# Patient Record
Sex: MALE | Race: WHITE | Employment: UNEMPLOYED | URBAN - METROPOLITAN AREA
[De-identification: names, ages, dates, MRNs, and addresses within clinical notes are randomized per-mention and may not be internally consistent; named-entity substitution may affect disease eponyms.]

---

## 2017-02-24 ENCOUNTER — HOSPITAL ENCOUNTER (EMERGENCY)
Age: 57
Discharge: HOME | End: 2017-02-24
Attending: EMERGENCY MEDICINE
Payer: MEDICAID

## 2017-02-24 ENCOUNTER — APPOINTMENT (OUTPATIENT)
Dept: GENERAL RADIOLOGY | Age: 57
End: 2017-02-24
Payer: MEDICAID

## 2017-02-24 VITALS
WEIGHT: 187 LBS | OXYGEN SATURATION: 100 % | TEMPERATURE: 97.6 F | BODY MASS INDEX: 24.78 KG/M2 | HEIGHT: 73 IN | HEART RATE: 57 BPM | DIASTOLIC BLOOD PRESSURE: 87 MMHG | SYSTOLIC BLOOD PRESSURE: 140 MMHG | RESPIRATION RATE: 20 BRPM

## 2017-02-24 DIAGNOSIS — S49.92XA INJURY OF LEFT CLAVICLE, INITIAL ENCOUNTER: Primary | ICD-10-CM

## 2017-02-24 LAB
ANION GAP SERPL CALCULATED.3IONS-SCNC: 11 MEQ/L (ref 7–13)
APTT: 27.3 SEC (ref 21.6–35.4)
BUN BLDV-MCNC: 15 MG/DL (ref 6–20)
CALCIUM SERPL-MCNC: 9.5 MG/DL (ref 8.6–10.2)
CHLORIDE BLD-SCNC: 103 MEQ/L (ref 98–107)
CO2: 25 MEQ/L (ref 22–29)
CREAT SERPL-MCNC: 0.98 MG/DL (ref 0.7–1.2)
GFR AFRICAN AMERICAN: >60
GFR NON-AFRICAN AMERICAN: >60
GLUCOSE BLD-MCNC: 94 MG/DL (ref 74–109)
HCT VFR BLD CALC: 40.3 % (ref 42–52)
HEMOGLOBIN: 14 G/DL (ref 14–18)
INR BLD: 1
MAGNESIUM: 2.1 MG/DL (ref 1.7–2.3)
MCH RBC QN AUTO: 30.9 PG (ref 27–31.3)
MCHC RBC AUTO-ENTMCNC: 34.8 % (ref 33–37)
MCV RBC AUTO: 88.8 FL (ref 80–100)
PDW BLD-RTO: 13 % (ref 11.5–14.5)
PLATELET # BLD: 196 K/UL (ref 130–400)
POTASSIUM SERPL-SCNC: 4 MEQ/L (ref 3.5–5.1)
PROTHROMBIN TIME: 10.5 SEC (ref 8.1–13.7)
RBC # BLD: 4.54 M/UL (ref 4.7–6.1)
SODIUM BLD-SCNC: 139 MEQ/L (ref 132–144)
TROPONIN: <0.01 NG/ML (ref 0–0.01)
WBC # BLD: 5 K/UL (ref 4.8–10.8)

## 2017-02-24 PROCEDURE — 83735 ASSAY OF MAGNESIUM: CPT

## 2017-02-24 PROCEDURE — 85027 COMPLETE CBC AUTOMATED: CPT

## 2017-02-24 PROCEDURE — 96374 THER/PROPH/DIAG INJ IV PUSH: CPT

## 2017-02-24 PROCEDURE — 93005 ELECTROCARDIOGRAM TRACING: CPT

## 2017-02-24 PROCEDURE — 80048 BASIC METABOLIC PNL TOTAL CA: CPT

## 2017-02-24 PROCEDURE — 96375 TX/PRO/DX INJ NEW DRUG ADDON: CPT

## 2017-02-24 PROCEDURE — 36415 COLL VENOUS BLD VENIPUNCTURE: CPT

## 2017-02-24 PROCEDURE — 73000 X-RAY EXAM OF COLLAR BONE: CPT

## 2017-02-24 PROCEDURE — 99285 EMERGENCY DEPT VISIT HI MDM: CPT

## 2017-02-24 PROCEDURE — 6360000002 HC RX W HCPCS: Performed by: EMERGENCY MEDICINE

## 2017-02-24 PROCEDURE — 71020 XR CHEST STANDARD TWO VW: CPT

## 2017-02-24 PROCEDURE — 73030 X-RAY EXAM OF SHOULDER: CPT

## 2017-02-24 PROCEDURE — 84484 ASSAY OF TROPONIN QUANT: CPT

## 2017-02-24 PROCEDURE — 85730 THROMBOPLASTIN TIME PARTIAL: CPT

## 2017-02-24 PROCEDURE — 85610 PROTHROMBIN TIME: CPT

## 2017-02-24 RX ORDER — DULOXETIN HYDROCHLORIDE 60 MG/1
60 CAPSULE, DELAYED RELEASE ORAL DAILY
COMMUNITY
End: 2019-03-04 | Stop reason: SDUPTHER

## 2017-02-24 RX ORDER — KETOROLAC TROMETHAMINE 30 MG/ML
30 INJECTION, SOLUTION INTRAMUSCULAR; INTRAVENOUS ONCE
Status: COMPLETED | OUTPATIENT
Start: 2017-02-24 | End: 2017-02-24

## 2017-02-24 RX ORDER — OXYCODONE HYDROCHLORIDE AND ACETAMINOPHEN 5; 325 MG/1; MG/1
1 TABLET ORAL EVERY 6 HOURS PRN
Qty: 10 TABLET | Refills: 0 | Status: SHIPPED | OUTPATIENT
Start: 2017-02-24 | End: 2018-11-29

## 2017-02-24 RX ORDER — DOCUSATE SODIUM 100 MG/1
100 CAPSULE, LIQUID FILLED ORAL 2 TIMES DAILY PRN
COMMUNITY

## 2017-02-24 RX ORDER — GABAPENTIN 600 MG/1
1200 TABLET ORAL 2 TIMES DAILY
COMMUNITY
End: 2019-03-04 | Stop reason: SDUPTHER

## 2017-02-24 RX ORDER — MIRTAZAPINE 15 MG/1
15 TABLET, FILM COATED ORAL NIGHTLY
COMMUNITY

## 2017-02-24 RX ORDER — ATORVASTATIN CALCIUM 10 MG/1
10 TABLET, FILM COATED ORAL DAILY
COMMUNITY

## 2017-02-24 RX ORDER — OMEPRAZOLE 20 MG/1
40 CAPSULE, DELAYED RELEASE ORAL DAILY
COMMUNITY

## 2017-02-24 RX ORDER — MORPHINE SULFATE 4 MG/ML
4 INJECTION, SOLUTION INTRAMUSCULAR; INTRAVENOUS ONCE
Status: COMPLETED | OUTPATIENT
Start: 2017-02-24 | End: 2017-02-24

## 2017-02-24 RX ORDER — LISINOPRIL 10 MG/1
10 TABLET ORAL DAILY
Status: ON HOLD | COMMUNITY
End: 2017-12-20 | Stop reason: HOSPADM

## 2017-02-24 RX ADMIN — KETOROLAC TROMETHAMINE 30 MG: 30 INJECTION, SOLUTION INTRAMUSCULAR at 09:21

## 2017-02-24 RX ADMIN — MORPHINE SULFATE 4 MG: 4 INJECTION, SOLUTION INTRAMUSCULAR; INTRAVENOUS at 09:22

## 2017-02-24 ASSESSMENT — PAIN DESCRIPTION - FREQUENCY: FREQUENCY: CONTINUOUS

## 2017-02-24 ASSESSMENT — PAIN SCALES - GENERAL
PAINLEVEL_OUTOF10: 8
PAINLEVEL_OUTOF10: 7
PAINLEVEL_OUTOF10: 5
PAINLEVEL_OUTOF10: 7

## 2017-02-24 ASSESSMENT — PAIN DESCRIPTION - LOCATION
LOCATION: SHOULDER;CHEST
LOCATION: CHEST
LOCATION: SHOULDER

## 2017-02-24 ASSESSMENT — PAIN DESCRIPTION - PAIN TYPE
TYPE: ACUTE PAIN

## 2017-02-24 ASSESSMENT — PAIN DESCRIPTION - ORIENTATION
ORIENTATION: LEFT

## 2017-02-24 ASSESSMENT — ENCOUNTER SYMPTOMS
SHORTNESS OF BREATH: 0
COUGH: 0
VOMITING: 0

## 2017-02-24 ASSESSMENT — PAIN DESCRIPTION - DESCRIPTORS: DESCRIPTORS: HEAVINESS

## 2017-02-28 LAB
EKG ATRIAL RATE: 53 BPM
EKG P AXIS: 50 DEGREES
EKG P-R INTERVAL: 166 MS
EKG Q-T INTERVAL: 474 MS
EKG QRS DURATION: 80 MS
EKG QTC CALCULATION (BAZETT): 444 MS
EKG R AXIS: 21 DEGREES
EKG T AXIS: 38 DEGREES
EKG VENTRICULAR RATE: 53 BPM

## 2017-06-13 ENCOUNTER — APPOINTMENT (OUTPATIENT)
Dept: GENERAL RADIOLOGY | Age: 57
End: 2017-06-13
Payer: MEDICAID

## 2017-06-13 ENCOUNTER — HOSPITAL ENCOUNTER (EMERGENCY)
Age: 57
Discharge: HOME | End: 2017-06-13
Attending: EMERGENCY MEDICINE
Payer: MEDICAID

## 2017-06-13 VITALS
OXYGEN SATURATION: 95 % | WEIGHT: 180 LBS | HEIGHT: 73 IN | DIASTOLIC BLOOD PRESSURE: 93 MMHG | HEART RATE: 81 BPM | RESPIRATION RATE: 14 BRPM | TEMPERATURE: 98.1 F | SYSTOLIC BLOOD PRESSURE: 134 MMHG | BODY MASS INDEX: 23.86 KG/M2

## 2017-06-13 DIAGNOSIS — M70.22 OLECRANON BURSITIS OF LEFT ELBOW: Primary | ICD-10-CM

## 2017-06-13 PROCEDURE — 99284 EMERGENCY DEPT VISIT MOD MDM: CPT

## 2017-06-13 PROCEDURE — 73080 X-RAY EXAM OF ELBOW: CPT

## 2017-06-13 ASSESSMENT — ENCOUNTER SYMPTOMS
SHORTNESS OF BREATH: 0
CHEST TIGHTNESS: 0
VOMITING: 0
SORE THROAT: 0
ABDOMINAL PAIN: 0
EYE PAIN: 0
NAUSEA: 0

## 2017-06-13 ASSESSMENT — PAIN DESCRIPTION - LOCATION: LOCATION: ELBOW

## 2017-06-13 ASSESSMENT — PAIN DESCRIPTION - DESCRIPTORS: DESCRIPTORS: SHARP

## 2017-06-13 ASSESSMENT — PAIN DESCRIPTION - ORIENTATION: ORIENTATION: LEFT

## 2017-06-13 ASSESSMENT — PAIN DESCRIPTION - DIRECTION: RADIATING_TOWARDS: LEFT SHOULDER

## 2017-06-13 ASSESSMENT — PAIN DESCRIPTION - PAIN TYPE: TYPE: ACUTE PAIN

## 2017-06-13 ASSESSMENT — PAIN SCALES - GENERAL: PAINLEVEL_OUTOF10: 7

## 2017-12-05 ENCOUNTER — ANESTHESIA EVENT (OUTPATIENT)
Dept: OPERATING ROOM | Age: 57
DRG: 850 | End: 2017-12-05
Payer: MEDICAID

## 2017-12-05 ENCOUNTER — ANESTHESIA (OUTPATIENT)
Dept: OPERATING ROOM | Age: 57
DRG: 850 | End: 2017-12-05
Payer: MEDICAID

## 2017-12-05 ENCOUNTER — APPOINTMENT (OUTPATIENT)
Dept: GENERAL RADIOLOGY | Age: 57
DRG: 850 | End: 2017-12-05
Attending: ORTHOPAEDIC SURGERY
Payer: MEDICAID

## 2017-12-05 ENCOUNTER — HOSPITAL ENCOUNTER (OUTPATIENT)
Age: 57
Discharge: NURSING, REHAB OR LONG-TERM CARE | DRG: 850 | End: 2017-12-06
Attending: ORTHOPAEDIC SURGERY | Admitting: ORTHOPAEDIC SURGERY
Payer: MEDICAID

## 2017-12-05 VITALS
RESPIRATION RATE: 12 BRPM | DIASTOLIC BLOOD PRESSURE: 63 MMHG | SYSTOLIC BLOOD PRESSURE: 146 MMHG | TEMPERATURE: 96.8 F | OXYGEN SATURATION: 100 %

## 2017-12-05 PROBLEM — S46.311A TRICEPS TENDON RUPTURE, RIGHT, INITIAL ENCOUNTER: Status: ACTIVE | Noted: 2017-12-05

## 2017-12-05 LAB
ANION GAP SERPL CALCULATED.3IONS-SCNC: 10 MEQ/L (ref 7–13)
BASOPHILS ABSOLUTE: 0.1 K/UL (ref 0–0.2)
BASOPHILS RELATIVE PERCENT: 1.5 %
BUN BLDV-MCNC: 10 MG/DL (ref 6–20)
CALCIUM SERPL-MCNC: 9.5 MG/DL (ref 8.6–10.2)
CHLORIDE BLD-SCNC: 100 MEQ/L (ref 98–107)
CO2: 26 MEQ/L (ref 22–29)
CREAT SERPL-MCNC: 0.93 MG/DL (ref 0.7–1.2)
EOSINOPHILS ABSOLUTE: 0.3 K/UL (ref 0–0.7)
EOSINOPHILS RELATIVE PERCENT: 4.8 %
GFR AFRICAN AMERICAN: >60
GFR NON-AFRICAN AMERICAN: >60
GLUCOSE BLD-MCNC: 96 MG/DL (ref 74–109)
HCT VFR BLD CALC: 42.3 % (ref 42–52)
HEMOGLOBIN: 14.3 G/DL (ref 14–18)
LYMPHOCYTES ABSOLUTE: 2.1 K/UL (ref 1–4.8)
LYMPHOCYTES RELATIVE PERCENT: 40.5 %
MCH RBC QN AUTO: 31.2 PG (ref 27–31.3)
MCHC RBC AUTO-ENTMCNC: 33.9 % (ref 33–37)
MCV RBC AUTO: 92 FL (ref 80–100)
MONOCYTES ABSOLUTE: 0.6 K/UL (ref 0.2–0.8)
MONOCYTES RELATIVE PERCENT: 12.1 %
NEUTROPHILS ABSOLUTE: 2.2 K/UL (ref 1.4–6.5)
NEUTROPHILS RELATIVE PERCENT: 41.1 %
PDW BLD-RTO: 13.5 % (ref 11.5–14.5)
PLATELET # BLD: 243 K/UL (ref 130–400)
POTASSIUM SERPL-SCNC: 4.2 MEQ/L (ref 3.5–5.1)
RBC # BLD: 4.6 M/UL (ref 4.7–6.1)
SODIUM BLD-SCNC: 136 MEQ/L (ref 132–144)
SPECIMEN STATUS: NORMAL
WBC # BLD: 5.3 K/UL (ref 4.8–10.8)

## 2017-12-05 PROCEDURE — 3700000001 HC ADD 15 MINUTES (ANESTHESIA): Performed by: ORTHOPAEDIC SURGERY

## 2017-12-05 PROCEDURE — 7100000000 HC PACU RECOVERY - FIRST 15 MIN: Performed by: ORTHOPAEDIC SURGERY

## 2017-12-05 PROCEDURE — 80048 BASIC METABOLIC PNL TOTAL CA: CPT

## 2017-12-05 PROCEDURE — 3700000000 HC ANESTHESIA ATTENDED CARE: Performed by: ORTHOPAEDIC SURGERY

## 2017-12-05 PROCEDURE — 3209999900 FLUORO FOR SURGICAL PROCEDURES

## 2017-12-05 PROCEDURE — 3600000004 HC SURGERY LEVEL 4 BASE: Performed by: ORTHOPAEDIC SURGERY

## 2017-12-05 PROCEDURE — 2500000003 HC RX 250 WO HCPCS: Performed by: NURSE ANESTHETIST, CERTIFIED REGISTERED

## 2017-12-05 PROCEDURE — 87205 SMEAR GRAM STAIN: CPT

## 2017-12-05 PROCEDURE — 76942 ECHO GUIDE FOR BIOPSY: CPT | Performed by: ANESTHESIOLOGY

## 2017-12-05 PROCEDURE — 6370000000 HC RX 637 (ALT 250 FOR IP): Performed by: INTERNAL MEDICINE

## 2017-12-05 PROCEDURE — 2500000003 HC RX 250 WO HCPCS: Performed by: ORTHOPAEDIC SURGERY

## 2017-12-05 PROCEDURE — 6360000002 HC RX W HCPCS: Performed by: ANESTHESIOLOGY

## 2017-12-05 PROCEDURE — 87070 CULTURE OTHR SPECIMN AEROBIC: CPT

## 2017-12-05 PROCEDURE — 2580000003 HC RX 258: Performed by: ORTHOPAEDIC SURGERY

## 2017-12-05 PROCEDURE — 87075 CULTR BACTERIA EXCEPT BLOOD: CPT

## 2017-12-05 PROCEDURE — C1713 ANCHOR/SCREW BN/BN,TIS/BN: HCPCS | Performed by: ORTHOPAEDIC SURGERY

## 2017-12-05 PROCEDURE — 85025 COMPLETE CBC W/AUTO DIFF WBC: CPT

## 2017-12-05 PROCEDURE — 6360000002 HC RX W HCPCS: Performed by: ORTHOPAEDIC SURGERY

## 2017-12-05 PROCEDURE — 6370000000 HC RX 637 (ALT 250 FOR IP): Performed by: ORTHOPAEDIC SURGERY

## 2017-12-05 PROCEDURE — 7100000001 HC PACU RECOVERY - ADDTL 15 MIN: Performed by: ORTHOPAEDIC SURGERY

## 2017-12-05 PROCEDURE — 6360000002 HC RX W HCPCS: Performed by: NURSE ANESTHETIST, CERTIFIED REGISTERED

## 2017-12-05 PROCEDURE — 2580000003 HC RX 258: Performed by: ANESTHESIOLOGY

## 2017-12-05 PROCEDURE — 2700000000 HC OXYGEN THERAPY PER DAY

## 2017-12-05 PROCEDURE — 2500000003 HC RX 250 WO HCPCS: Performed by: ANESTHESIOLOGY

## 2017-12-05 PROCEDURE — 3600000014 HC SURGERY LEVEL 4 ADDTL 15MIN: Performed by: ORTHOPAEDIC SURGERY

## 2017-12-05 DEVICE — KIT INT FIX W4.75XL19.1MM 2ND W/ SWIVELOCK FOR ACL/PCL REP: Type: IMPLANTABLE DEVICE | Site: ELBOW | Status: FUNCTIONAL

## 2017-12-05 DEVICE — ANCHOR SUTURE BIOCOMP 3X14.5 MM FIBERWIRE 2 TIGERWIRE: Type: IMPLANTABLE DEVICE | Site: ELBOW | Status: FUNCTIONAL

## 2017-12-05 RX ORDER — FENTANYL CITRATE 50 UG/ML
50 INJECTION, SOLUTION INTRAMUSCULAR; INTRAVENOUS EVERY 10 MIN PRN
Status: DISCONTINUED | OUTPATIENT
Start: 2017-12-05 | End: 2017-12-05 | Stop reason: HOSPADM

## 2017-12-05 RX ORDER — HYDROCODONE BITARTRATE AND ACETAMINOPHEN 5; 325 MG/1; MG/1
1 TABLET ORAL PRN
Status: DISCONTINUED | OUTPATIENT
Start: 2017-12-05 | End: 2017-12-05 | Stop reason: HOSPADM

## 2017-12-05 RX ORDER — ONDANSETRON 2 MG/ML
4 INJECTION INTRAMUSCULAR; INTRAVENOUS
Status: DISCONTINUED | OUTPATIENT
Start: 2017-12-05 | End: 2017-12-05 | Stop reason: HOSPADM

## 2017-12-05 RX ORDER — SODIUM CHLORIDE, SODIUM LACTATE, POTASSIUM CHLORIDE, CALCIUM CHLORIDE 600; 310; 30; 20 MG/100ML; MG/100ML; MG/100ML; MG/100ML
INJECTION, SOLUTION INTRAVENOUS CONTINUOUS
Status: DISCONTINUED | OUTPATIENT
Start: 2017-12-05 | End: 2017-12-05

## 2017-12-05 RX ORDER — DORZOLAMIDE HYDROCHLORIDE AND TIMOLOL MALEATE 20; 5 MG/ML; MG/ML
SOLUTION/ DROPS OPHTHALMIC
Refills: 6 | COMMUNITY
Start: 2017-11-05

## 2017-12-05 RX ORDER — LIDOCAINE HYDROCHLORIDE 20 MG/ML
INJECTION, SOLUTION INFILTRATION; PERINEURAL PRN
Status: DISCONTINUED | OUTPATIENT
Start: 2017-12-05 | End: 2017-12-05 | Stop reason: SDUPTHER

## 2017-12-05 RX ORDER — GABAPENTIN 300 MG/1
1200 CAPSULE ORAL 2 TIMES DAILY
Status: DISCONTINUED | OUTPATIENT
Start: 2017-12-05 | End: 2017-12-06 | Stop reason: HOSPADM

## 2017-12-05 RX ORDER — DULOXETIN HYDROCHLORIDE 60 MG/1
60 CAPSULE, DELAYED RELEASE ORAL DAILY
Status: DISCONTINUED | OUTPATIENT
Start: 2017-12-05 | End: 2017-12-06 | Stop reason: HOSPADM

## 2017-12-05 RX ORDER — MIRTAZAPINE 15 MG/1
15 TABLET, FILM COATED ORAL NIGHTLY
Status: DISCONTINUED | OUTPATIENT
Start: 2017-12-05 | End: 2017-12-06 | Stop reason: HOSPADM

## 2017-12-05 RX ORDER — LIDOCAINE HYDROCHLORIDE 10 MG/ML
1 INJECTION, SOLUTION EPIDURAL; INFILTRATION; INTRACAUDAL; PERINEURAL ONCE
Status: DISCONTINUED | OUTPATIENT
Start: 2017-12-05 | End: 2017-12-05 | Stop reason: HOSPADM

## 2017-12-05 RX ORDER — MIDAZOLAM HYDROCHLORIDE 1 MG/ML
INJECTION INTRAMUSCULAR; INTRAVENOUS PRN
Status: DISCONTINUED | OUTPATIENT
Start: 2017-12-05 | End: 2017-12-05 | Stop reason: SDUPTHER

## 2017-12-05 RX ORDER — PANTOPRAZOLE SODIUM 20 MG/1
40 TABLET, DELAYED RELEASE ORAL DAILY
Status: DISCONTINUED | OUTPATIENT
Start: 2017-12-05 | End: 2017-12-06 | Stop reason: HOSPADM

## 2017-12-05 RX ORDER — DOCUSATE SODIUM 100 MG/1
100 CAPSULE, LIQUID FILLED ORAL 2 TIMES DAILY PRN
Status: DISCONTINUED | OUTPATIENT
Start: 2017-12-05 | End: 2017-12-06 | Stop reason: HOSPADM

## 2017-12-05 RX ORDER — DEXAMETHASONE SODIUM PHOSPHATE 4 MG/ML
INJECTION, SOLUTION INTRA-ARTICULAR; INTRALESIONAL; INTRAMUSCULAR; INTRAVENOUS; SOFT TISSUE PRN
Status: DISCONTINUED | OUTPATIENT
Start: 2017-12-05 | End: 2017-12-05 | Stop reason: SDUPTHER

## 2017-12-05 RX ORDER — METOCLOPRAMIDE HYDROCHLORIDE 5 MG/ML
10 INJECTION INTRAMUSCULAR; INTRAVENOUS
Status: DISCONTINUED | OUTPATIENT
Start: 2017-12-05 | End: 2017-12-05 | Stop reason: HOSPADM

## 2017-12-05 RX ORDER — LIDOCAINE HYDROCHLORIDE 10 MG/ML
1 INJECTION, SOLUTION EPIDURAL; INFILTRATION; INTRACAUDAL; PERINEURAL
Status: COMPLETED | OUTPATIENT
Start: 2017-12-05 | End: 2017-12-05

## 2017-12-05 RX ORDER — DOCUSATE SODIUM 100 MG/1
100 CAPSULE, LIQUID FILLED ORAL 2 TIMES DAILY
Status: DISCONTINUED | OUTPATIENT
Start: 2017-12-05 | End: 2017-12-06 | Stop reason: HOSPADM

## 2017-12-05 RX ORDER — SODIUM CHLORIDE 0.9 % (FLUSH) 0.9 %
10 SYRINGE (ML) INJECTION EVERY 12 HOURS SCHEDULED
Status: DISCONTINUED | OUTPATIENT
Start: 2017-12-05 | End: 2017-12-05 | Stop reason: HOSPADM

## 2017-12-05 RX ORDER — OXYCODONE HYDROCHLORIDE AND ACETAMINOPHEN 5; 325 MG/1; MG/1
2 TABLET ORAL EVERY 4 HOURS PRN
Status: DISCONTINUED | OUTPATIENT
Start: 2017-12-05 | End: 2017-12-06 | Stop reason: HOSPADM

## 2017-12-05 RX ORDER — ROPIVACAINE HYDROCHLORIDE 5 MG/ML
INJECTION, SOLUTION EPIDURAL; INFILTRATION; PERINEURAL PRN
Status: DISCONTINUED | OUTPATIENT
Start: 2017-12-05 | End: 2017-12-05 | Stop reason: SDUPTHER

## 2017-12-05 RX ORDER — EPHEDRINE SULFATE 50 MG/ML
INJECTION, SOLUTION INTRAVENOUS PRN
Status: DISCONTINUED | OUTPATIENT
Start: 2017-12-05 | End: 2017-12-05 | Stop reason: SDUPTHER

## 2017-12-05 RX ORDER — FLUOCINOLONE ACETONIDE 0.1 MG/ML
SOLUTION TOPICAL
Refills: 1 | COMMUNITY
Start: 2017-11-09

## 2017-12-05 RX ORDER — OXYCODONE HYDROCHLORIDE AND ACETAMINOPHEN 5; 325 MG/1; MG/1
1 TABLET ORAL EVERY 4 HOURS PRN
Status: DISCONTINUED | OUTPATIENT
Start: 2017-12-05 | End: 2017-12-06 | Stop reason: HOSPADM

## 2017-12-05 RX ORDER — MAGNESIUM HYDROXIDE 1200 MG/15ML
LIQUID ORAL CONTINUOUS PRN
Status: DISCONTINUED | OUTPATIENT
Start: 2017-12-05 | End: 2017-12-05 | Stop reason: HOSPADM

## 2017-12-05 RX ORDER — LIDOCAINE 50 MG/G
OINTMENT TOPICAL
Refills: 6 | COMMUNITY
Start: 2017-09-28 | End: 2019-03-04 | Stop reason: SDUPTHER

## 2017-12-05 RX ORDER — ONDANSETRON 2 MG/ML
INJECTION INTRAMUSCULAR; INTRAVENOUS PRN
Status: DISCONTINUED | OUTPATIENT
Start: 2017-12-05 | End: 2017-12-05 | Stop reason: SDUPTHER

## 2017-12-05 RX ORDER — MEPERIDINE HYDROCHLORIDE 25 MG/ML
12.5 INJECTION INTRAMUSCULAR; INTRAVENOUS; SUBCUTANEOUS EVERY 5 MIN PRN
Status: DISCONTINUED | OUTPATIENT
Start: 2017-12-05 | End: 2017-12-05 | Stop reason: HOSPADM

## 2017-12-05 RX ORDER — SENNA AND DOCUSATE SODIUM 50; 8.6 MG/1; MG/1
1 TABLET, FILM COATED ORAL DAILY
Status: DISCONTINUED | OUTPATIENT
Start: 2017-12-05 | End: 2017-12-06 | Stop reason: HOSPADM

## 2017-12-05 RX ORDER — BACLOFEN 10 MG/1
TABLET ORAL
Refills: 6 | COMMUNITY
Start: 2017-11-09

## 2017-12-05 RX ORDER — ATROPINE SULFATE 0.1 MG/ML
INJECTION INTRAVENOUS PRN
Status: DISCONTINUED | OUTPATIENT
Start: 2017-12-05 | End: 2017-12-05 | Stop reason: SDUPTHER

## 2017-12-05 RX ORDER — ACETAMINOPHEN 325 MG/1
650 TABLET ORAL EVERY 4 HOURS PRN
Status: DISCONTINUED | OUTPATIENT
Start: 2017-12-05 | End: 2017-12-06 | Stop reason: HOSPADM

## 2017-12-05 RX ORDER — KETOROLAC TROMETHAMINE 30 MG/ML
30 INJECTION, SOLUTION INTRAMUSCULAR; INTRAVENOUS EVERY 6 HOURS
Status: COMPLETED | OUTPATIENT
Start: 2017-12-05 | End: 2017-12-05

## 2017-12-05 RX ORDER — SODIUM CHLORIDE 0.9 % (FLUSH) 0.9 %
10 SYRINGE (ML) INJECTION PRN
Status: DISCONTINUED | OUTPATIENT
Start: 2017-12-05 | End: 2017-12-05 | Stop reason: HOSPADM

## 2017-12-05 RX ORDER — SODIUM CHLORIDE 9 MG/ML
INJECTION, SOLUTION INTRAVENOUS CONTINUOUS
Status: DISCONTINUED | OUTPATIENT
Start: 2017-12-05 | End: 2017-12-06 | Stop reason: HOSPADM

## 2017-12-05 RX ORDER — MORPHINE SULFATE 4 MG/ML
4 INJECTION, SOLUTION INTRAMUSCULAR; INTRAVENOUS
Status: DISCONTINUED | OUTPATIENT
Start: 2017-12-05 | End: 2017-12-06 | Stop reason: HOSPADM

## 2017-12-05 RX ORDER — DORZOLAMIDE HCL 20 MG/ML
1 SOLUTION/ DROPS OPHTHALMIC 2 TIMES DAILY
Status: DISCONTINUED | OUTPATIENT
Start: 2017-12-05 | End: 2017-12-06 | Stop reason: HOSPADM

## 2017-12-05 RX ORDER — MORPHINE SULFATE 2 MG/ML
2 INJECTION, SOLUTION INTRAMUSCULAR; INTRAVENOUS
Status: DISCONTINUED | OUTPATIENT
Start: 2017-12-05 | End: 2017-12-06 | Stop reason: HOSPADM

## 2017-12-05 RX ORDER — VANCOMYCIN HYDROCHLORIDE 1 G/200ML
1000 INJECTION, SOLUTION INTRAVENOUS
Status: COMPLETED | OUTPATIENT
Start: 2017-12-05 | End: 2017-12-05

## 2017-12-05 RX ORDER — ATORVASTATIN CALCIUM 10 MG/1
10 TABLET, FILM COATED ORAL DAILY
Status: DISCONTINUED | OUTPATIENT
Start: 2017-12-05 | End: 2017-12-06 | Stop reason: HOSPADM

## 2017-12-05 RX ORDER — HYDROCODONE BITARTRATE AND ACETAMINOPHEN 5; 325 MG/1; MG/1
2 TABLET ORAL PRN
Status: DISCONTINUED | OUTPATIENT
Start: 2017-12-05 | End: 2017-12-05 | Stop reason: HOSPADM

## 2017-12-05 RX ORDER — DIPHENHYDRAMINE HYDROCHLORIDE 50 MG/ML
12.5 INJECTION INTRAMUSCULAR; INTRAVENOUS
Status: DISCONTINUED | OUTPATIENT
Start: 2017-12-05 | End: 2017-12-05 | Stop reason: HOSPADM

## 2017-12-05 RX ORDER — PROPOFOL 10 MG/ML
INJECTION, EMULSION INTRAVENOUS PRN
Status: DISCONTINUED | OUTPATIENT
Start: 2017-12-05 | End: 2017-12-05 | Stop reason: SDUPTHER

## 2017-12-05 RX ORDER — FENTANYL CITRATE 50 UG/ML
INJECTION, SOLUTION INTRAMUSCULAR; INTRAVENOUS PRN
Status: DISCONTINUED | OUTPATIENT
Start: 2017-12-05 | End: 2017-12-05 | Stop reason: SDUPTHER

## 2017-12-05 RX ORDER — GLYCOPYRROLATE 0.2 MG/ML
INJECTION INTRAMUSCULAR; INTRAVENOUS PRN
Status: DISCONTINUED | OUTPATIENT
Start: 2017-12-05 | End: 2017-12-05 | Stop reason: SDUPTHER

## 2017-12-05 RX ORDER — BRIMONIDINE TARTRATE 1.5 MG/ML
SOLUTION/ DROPS OPHTHALMIC
Refills: 6 | COMMUNITY
Start: 2017-11-06

## 2017-12-05 RX ORDER — DORZOLAMIDE HYDROCHLORIDE AND TIMOLOL MALEATE 20; 5 MG/ML; MG/ML
1 SOLUTION/ DROPS OPHTHALMIC 2 TIMES DAILY
Status: DISCONTINUED | OUTPATIENT
Start: 2017-12-05 | End: 2017-12-05 | Stop reason: CLARIF

## 2017-12-05 RX ORDER — SODIUM CHLORIDE 0.9 % (FLUSH) 0.9 %
10 SYRINGE (ML) INJECTION PRN
Status: DISCONTINUED | OUTPATIENT
Start: 2017-12-05 | End: 2017-12-06 | Stop reason: HOSPADM

## 2017-12-05 RX ORDER — SODIUM CHLORIDE 0.9 % (FLUSH) 0.9 %
10 SYRINGE (ML) INJECTION EVERY 12 HOURS SCHEDULED
Status: DISCONTINUED | OUTPATIENT
Start: 2017-12-05 | End: 2017-12-06 | Stop reason: HOSPADM

## 2017-12-05 RX ORDER — CLINDAMYCIN PHOSPHATE 900 MG/50ML
900 INJECTION INTRAVENOUS EVERY 8 HOURS
Status: COMPLETED | OUTPATIENT
Start: 2017-12-05 | End: 2017-12-06

## 2017-12-05 RX ORDER — TIMOLOL MALEATE 5 MG/ML
1 SOLUTION/ DROPS OPHTHALMIC 2 TIMES DAILY
Status: DISCONTINUED | OUTPATIENT
Start: 2017-12-05 | End: 2017-12-06 | Stop reason: HOSPADM

## 2017-12-05 RX ORDER — ONDANSETRON 2 MG/ML
4 INJECTION INTRAMUSCULAR; INTRAVENOUS EVERY 6 HOURS PRN
Status: DISCONTINUED | OUTPATIENT
Start: 2017-12-05 | End: 2017-12-06 | Stop reason: HOSPADM

## 2017-12-05 RX ORDER — BRIMONIDINE TARTRATE 2 MG/ML
1 SOLUTION/ DROPS OPHTHALMIC 3 TIMES DAILY
Status: DISCONTINUED | OUTPATIENT
Start: 2017-12-05 | End: 2017-12-06 | Stop reason: HOSPADM

## 2017-12-05 RX ORDER — BACLOFEN 10 MG/1
10 TABLET ORAL 2 TIMES DAILY
Status: DISCONTINUED | OUTPATIENT
Start: 2017-12-05 | End: 2017-12-06 | Stop reason: HOSPADM

## 2017-12-05 RX ADMIN — SODIUM CHLORIDE, POTASSIUM CHLORIDE, SODIUM LACTATE AND CALCIUM CHLORIDE: 600; 310; 30; 20 INJECTION, SOLUTION INTRAVENOUS at 09:09

## 2017-12-05 RX ADMIN — FENTANYL CITRATE 50 MCG: 50 INJECTION, SOLUTION INTRAMUSCULAR; INTRAVENOUS at 11:03

## 2017-12-05 RX ADMIN — GLYCOPYRROLATE 0.2 MG: 0.2 INJECTION INTRAMUSCULAR; INTRAVENOUS at 10:41

## 2017-12-05 RX ADMIN — BACLOFEN 10 MG: 10 TABLET ORAL at 20:54

## 2017-12-05 RX ADMIN — VANCOMYCIN HYDROCHLORIDE 1 G: 1 INJECTION, SOLUTION INTRAVENOUS at 10:15

## 2017-12-05 RX ADMIN — PHENYLEPHRINE HYDROCHLORIDE 100 MCG: 10 INJECTION INTRAVENOUS at 10:26

## 2017-12-05 RX ADMIN — EPHEDRINE SULFATE 5 MG: 50 INJECTION, SOLUTION INTRAMUSCULAR; INTRAVENOUS; SUBCUTANEOUS at 11:18

## 2017-12-05 RX ADMIN — ATORVASTATIN CALCIUM 10 MG: 10 TABLET, FILM COATED ORAL at 20:54

## 2017-12-05 RX ADMIN — LIDOCAINE HYDROCHLORIDE 0.1 ML: 10 INJECTION, SOLUTION EPIDURAL; INFILTRATION; INTRACAUDAL; PERINEURAL at 09:06

## 2017-12-05 RX ADMIN — EPHEDRINE SULFATE 5 MG: 50 INJECTION, SOLUTION INTRAMUSCULAR; INTRAVENOUS; SUBCUTANEOUS at 11:22

## 2017-12-05 RX ADMIN — TIMOLOL MALEATE 1 DROP: 5 SOLUTION OPHTHALMIC at 20:55

## 2017-12-05 RX ADMIN — ATROPINE SULFATE 0.4 MG: 0.1 INJECTION PARENTERAL at 10:45

## 2017-12-05 RX ADMIN — CLINDAMYCIN IN 5 PERCENT DEXTROSE 900 MG: 18 INJECTION, SOLUTION INTRAVENOUS at 23:24

## 2017-12-05 RX ADMIN — OXYCODONE HYDROCHLORIDE AND ACETAMINOPHEN 2 TABLET: 5; 325 TABLET ORAL at 19:01

## 2017-12-05 RX ADMIN — SODIUM CHLORIDE, POTASSIUM CHLORIDE, SODIUM LACTATE AND CALCIUM CHLORIDE: 600; 310; 30; 20 INJECTION, SOLUTION INTRAVENOUS at 10:57

## 2017-12-05 RX ADMIN — SODIUM CHLORIDE: 9 INJECTION, SOLUTION INTRAVENOUS at 16:08

## 2017-12-05 RX ADMIN — DULOXETINE HYDROCHLORIDE 60 MG: 60 CAPSULE, DELAYED RELEASE ORAL at 20:55

## 2017-12-05 RX ADMIN — OXYCODONE HYDROCHLORIDE AND ACETAMINOPHEN 2 TABLET: 5; 325 TABLET ORAL at 23:24

## 2017-12-05 RX ADMIN — DOCUSATE SODIUM 100 MG: 100 CAPSULE, LIQUID FILLED ORAL at 20:54

## 2017-12-05 RX ADMIN — LIDOCAINE HYDROCHLORIDE 40 MG: 20 INJECTION, SOLUTION INFILTRATION; PERINEURAL at 10:21

## 2017-12-05 RX ADMIN — EPHEDRINE SULFATE 5 MG: 50 INJECTION, SOLUTION INTRAMUSCULAR; INTRAVENOUS; SUBCUTANEOUS at 11:55

## 2017-12-05 RX ADMIN — CLINDAMYCIN IN 5 PERCENT DEXTROSE 900 MG: 18 INJECTION, SOLUTION INTRAVENOUS at 16:08

## 2017-12-05 RX ADMIN — KETOROLAC TROMETHAMINE 30 MG: 30 INJECTION, SOLUTION INTRAMUSCULAR at 16:08

## 2017-12-05 RX ADMIN — EPHEDRINE SULFATE 5 MG: 50 INJECTION, SOLUTION INTRAMUSCULAR; INTRAVENOUS; SUBCUTANEOUS at 11:26

## 2017-12-05 RX ADMIN — FENTANYL CITRATE 50 MCG: 50 INJECTION, SOLUTION INTRAMUSCULAR; INTRAVENOUS at 10:21

## 2017-12-05 RX ADMIN — ROPIVACAINE HYDROCHLORIDE 40 ML: 5 INJECTION, SOLUTION EPIDURAL; INFILTRATION; PERINEURAL at 09:30

## 2017-12-05 RX ADMIN — SUGAMMADEX 200 MG: 100 INJECTION, SOLUTION INTRAVENOUS at 11:57

## 2017-12-05 RX ADMIN — MIRTAZAPINE 15 MG: 15 TABLET, FILM COATED ORAL at 20:54

## 2017-12-05 RX ADMIN — SENNOSIDES AND DOCUSATE SODIUM 1 TABLET: 8.6; 5 TABLET ORAL at 20:54

## 2017-12-05 RX ADMIN — EPHEDRINE SULFATE 5 MG: 50 INJECTION, SOLUTION INTRAMUSCULAR; INTRAVENOUS; SUBCUTANEOUS at 12:00

## 2017-12-05 RX ADMIN — Medication 20 MCG/MIN: at 11:35

## 2017-12-05 RX ADMIN — EPHEDRINE SULFATE 5 MG: 50 INJECTION, SOLUTION INTRAMUSCULAR; INTRAVENOUS; SUBCUTANEOUS at 11:16

## 2017-12-05 RX ADMIN — EPHEDRINE SULFATE 5 MG: 50 INJECTION, SOLUTION INTRAMUSCULAR; INTRAVENOUS; SUBCUTANEOUS at 11:24

## 2017-12-05 RX ADMIN — ONDANSETRON 4 MG: 2 INJECTION INTRAMUSCULAR; INTRAVENOUS at 11:55

## 2017-12-05 RX ADMIN — PHENYLEPHRINE HYDROCHLORIDE 200 MCG: 10 INJECTION INTRAVENOUS at 10:40

## 2017-12-05 RX ADMIN — MIDAZOLAM HYDROCHLORIDE 2 MG: 1 INJECTION, SOLUTION INTRAMUSCULAR; INTRAVENOUS at 09:25

## 2017-12-05 RX ADMIN — GLYCOPYRROLATE 0.2 MG: 0.2 INJECTION INTRAMUSCULAR; INTRAVENOUS at 10:44

## 2017-12-05 RX ADMIN — PROPOFOL 200 MG: 10 INJECTION, EMULSION INTRAVENOUS at 10:21

## 2017-12-05 RX ADMIN — KETOROLAC TROMETHAMINE 30 MG: 30 INJECTION, SOLUTION INTRAMUSCULAR at 20:52

## 2017-12-05 RX ADMIN — DEXAMETHASONE SODIUM PHOSPHATE 4 MG: 4 INJECTION INTRA-ARTICULAR; INTRALESIONAL; INTRAMUSCULAR; INTRAVENOUS; SOFT TISSUE at 12:23

## 2017-12-05 RX ADMIN — BRIMONIDINE TARTRATE OPHTHALMIC SOLUTION, 0.2% 1 DROP: 2 SOLUTION/ DROPS OPHTHALMIC at 20:55

## 2017-12-05 RX ADMIN — EPHEDRINE SULFATE 10 MG: 50 INJECTION, SOLUTION INTRAMUSCULAR; INTRAVENOUS; SUBCUTANEOUS at 11:28

## 2017-12-05 RX ADMIN — GABAPENTIN 1200 MG: 300 CAPSULE ORAL at 20:54

## 2017-12-05 RX ADMIN — FENTANYL CITRATE 100 MCG: 50 INJECTION, SOLUTION INTRAMUSCULAR; INTRAVENOUS at 09:25

## 2017-12-05 RX ADMIN — PHENYLEPHRINE HYDROCHLORIDE 100 MCG: 10 INJECTION INTRAVENOUS at 10:55

## 2017-12-05 RX ADMIN — BRIMONIDINE TARTRATE OPHTHALMIC SOLUTION, 0.2% 1 DROP: 2 SOLUTION/ DROPS OPHTHALMIC at 16:41

## 2017-12-05 RX ADMIN — EPHEDRINE SULFATE 5 MG: 50 INJECTION, SOLUTION INTRAMUSCULAR; INTRAVENOUS; SUBCUTANEOUS at 11:58

## 2017-12-05 RX ADMIN — PHENYLEPHRINE HYDROCHLORIDE 100 MCG: 10 INJECTION INTRAVENOUS at 11:07

## 2017-12-05 ASSESSMENT — PULMONARY FUNCTION TESTS
PIF_VALUE: 22
PIF_VALUE: 2
PIF_VALUE: 2
PIF_VALUE: 18
PIF_VALUE: 20
PIF_VALUE: 18
PIF_VALUE: 20
PIF_VALUE: 19
PIF_VALUE: 20
PIF_VALUE: 12
PIF_VALUE: 22
PIF_VALUE: 3
PIF_VALUE: 19
PIF_VALUE: 20
PIF_VALUE: 22
PIF_VALUE: 19
PIF_VALUE: 20
PIF_VALUE: 19
PIF_VALUE: 22
PIF_VALUE: 19
PIF_VALUE: 12
PIF_VALUE: 2
PIF_VALUE: 20
PIF_VALUE: 19
PIF_VALUE: 21
PIF_VALUE: 21
PIF_VALUE: 26
PIF_VALUE: 9
PIF_VALUE: 20
PIF_VALUE: 20
PIF_VALUE: 18
PIF_VALUE: 21
PIF_VALUE: 22
PIF_VALUE: 20
PIF_VALUE: 19
PIF_VALUE: 20
PIF_VALUE: 20
PIF_VALUE: 15
PIF_VALUE: 2
PIF_VALUE: 18
PIF_VALUE: 4
PIF_VALUE: 22
PIF_VALUE: 19
PIF_VALUE: 20
PIF_VALUE: 21
PIF_VALUE: 18
PIF_VALUE: 24
PIF_VALUE: 20
PIF_VALUE: 3
PIF_VALUE: 19
PIF_VALUE: 5
PIF_VALUE: 20
PIF_VALUE: 21
PIF_VALUE: 21
PIF_VALUE: 20
PIF_VALUE: 18
PIF_VALUE: 20
PIF_VALUE: 21
PIF_VALUE: 19
PIF_VALUE: 0
PIF_VALUE: 19
PIF_VALUE: 19
PIF_VALUE: 21
PIF_VALUE: 19
PIF_VALUE: 18
PIF_VALUE: 1
PIF_VALUE: 22
PIF_VALUE: 14
PIF_VALUE: 18
PIF_VALUE: 21
PIF_VALUE: 19
PIF_VALUE: 18
PIF_VALUE: 20
PIF_VALUE: 3
PIF_VALUE: 19
PIF_VALUE: 20
PIF_VALUE: 22
PIF_VALUE: 21
PIF_VALUE: 19
PIF_VALUE: 20
PIF_VALUE: 1
PIF_VALUE: 20
PIF_VALUE: 2
PIF_VALUE: 19
PIF_VALUE: 5
PIF_VALUE: 21
PIF_VALUE: 3
PIF_VALUE: 23
PIF_VALUE: 22
PIF_VALUE: 12
PIF_VALUE: 21
PIF_VALUE: 19
PIF_VALUE: 21
PIF_VALUE: 20
PIF_VALUE: 19
PIF_VALUE: 18
PIF_VALUE: 3
PIF_VALUE: 2
PIF_VALUE: 2
PIF_VALUE: 19
PIF_VALUE: 19
PIF_VALUE: 1
PIF_VALUE: 22
PIF_VALUE: 19
PIF_VALUE: 0
PIF_VALUE: 21
PIF_VALUE: 10
PIF_VALUE: 20
PIF_VALUE: 20
PIF_VALUE: 22
PIF_VALUE: 19
PIF_VALUE: 0
PIF_VALUE: 3
PIF_VALUE: 22
PIF_VALUE: 20
PIF_VALUE: 21
PIF_VALUE: 12
PIF_VALUE: 6
PIF_VALUE: 19
PIF_VALUE: 20
PIF_VALUE: 19
PIF_VALUE: 2
PIF_VALUE: 21
PIF_VALUE: 19
PIF_VALUE: 22
PIF_VALUE: 25
PIF_VALUE: 4
PIF_VALUE: 2
PIF_VALUE: 21
PIF_VALUE: 22
PIF_VALUE: 18
PIF_VALUE: 24
PIF_VALUE: 12

## 2017-12-05 ASSESSMENT — ENCOUNTER SYMPTOMS
BLURRED VISION: 0
NAUSEA: 0
DOUBLE VISION: 0

## 2017-12-05 ASSESSMENT — PAIN SCALES - GENERAL
PAINLEVEL_OUTOF10: 9
PAINLEVEL_OUTOF10: 5
PAINLEVEL_OUTOF10: 10
PAINLEVEL_OUTOF10: 9

## 2017-12-05 ASSESSMENT — PAIN DESCRIPTION - DESCRIPTORS: DESCRIPTORS: CONSTANT;ACHING

## 2017-12-05 ASSESSMENT — PAIN - FUNCTIONAL ASSESSMENT: PAIN_FUNCTIONAL_ASSESSMENT: 0-10

## 2017-12-05 NOTE — ANESTHESIA PRE PROCEDURE
visit.                                           BP Readings from Last 3 Encounters:   06/13/17 (!) 134/93   02/24/17 140/87   09/30/16 (!) 158/128       NPO Status:                                                                                 BMI:   Wt Readings from Last 3 Encounters:   11/15/17 205 lb (93 kg)   11/01/17 198 lb (89.8 kg)   06/13/17 180 lb (81.6 kg)     There is no height or weight on file to calculate BMI.    CBC:   Lab Results   Component Value Date    WBC 5.0 02/24/2017    RBC 4.54 02/24/2017    HGB 14.0 02/24/2017    HCT 40.3 02/24/2017    MCV 88.8 02/24/2017    RDW 13.0 02/24/2017     02/24/2017       CMP:   Lab Results   Component Value Date     02/24/2017    K 4.0 02/24/2017     02/24/2017    CO2 25 02/24/2017    BUN 15 02/24/2017    CREATININE 0.98 02/24/2017    GFRAA >60.0 02/24/2017    LABGLOM >60.0 02/24/2017    GLUCOSE 94 02/24/2017    PROT 7.2 09/30/2016    CALCIUM 9.5 02/24/2017    BILITOT 0.3 09/30/2016    ALKPHOS 107 09/30/2016    AST 32 09/30/2016    ALT 35 09/30/2016       POC Tests: No results for input(s): POCGLU, POCNA, POCK, POCCL, POCBUN, POCHEMO, POCHCT in the last 72 hours.     Coags:   Lab Results   Component Value Date    PROTIME 10.5 02/24/2017    INR 1.0 02/24/2017    APTT 27.3 02/24/2017       HCG (If Applicable): No results found for: PREGTESTUR, PREGSERUM, HCG, HCGQUANT     ABGs: No results found for: PHART, PO2ART, LVH0YEX, SVZ6KNV, BEART, B4HHXXZR     Type & Screen (If Applicable):  No results found for: [facility]    Anesthesia Evaluation  Patient summary reviewed and Nursing notes reviewed no history of anesthetic complications:   Airway: Mallampati: II  TM distance: >3 FB   Neck ROM: full  Mouth opening: > = 3 FB Dental: normal exam         Pulmonary:normal exam    (+) asthma: seasonal asthma,                            Cardiovascular:    (+) hypertension: no interval change,       ECG reviewed                        Neuro/Psych:   Negative Neuro/Psych ROS              GI/Hepatic/Renal: Neg GI/Hepatic/Renal ROS            Endo/Other: Negative Endo/Other ROS                    Abdominal:           Vascular: negative vascular ROS. Anesthesia Plan      general     ASA 3       Induction: intravenous and inhalational.    MIPS: Postoperative opioids intended and Prophylactic antiemetics administered. Anesthetic plan and risks discussed with patient. Plan discussed with CRNA.     Attending anesthesiologist reviewed and agrees with Pre Eval content              Marybeth Jefferson MD   12/5/2017

## 2017-12-05 NOTE — ANESTHESIA PROCEDURE NOTES
Peripheral Block    Patient location during procedure: pre-op  Staffing  Anesthesiologist: Seun Mensah  Performed: anesthesiologist   Preanesthetic Checklist  Completed: patient identified, site marked, surgical consent, pre-op evaluation, timeout performed, IV checked, risks and benefits discussed, monitors and equipment checked, anesthesia consent given, oxygen available and patient being monitored  Peripheral Block  Patient position: supine  Prep: ChloraPrep  Patient monitoring: continuous pulse ox, frequent blood pressure checks and IV access  Block type: Brachial plexus  Laterality: right  Injection technique: single-shot  Procedures: ultrasound guided and nerve stimulator  Local infiltration: ropivacaine  Infiltration strength: 0.5 %  Dose: 40 mL  Supraclavicular  Provider prep: mask and sterile gloves  Local infiltration: ropivacaine  Needle  Needle type: combined needle/nerve stimulator   Needle gauge: 21 G  Needle length: 10 cm  Needle localization: nerve stimulator and ultrasound guidance  Assessment  Injection assessment: negative aspiration for heme, no paresthesia on injection and local visualized surrounding nerve on ultrasound  Paresthesia pain: none  Slow fractionated injection: yes  Hemodynamics: stable  Reason for block: post-op pain management

## 2017-12-05 NOTE — PROGRESS NOTES
To PACU for nerve block byDr Janina Alvares, Report given to Luana Lorenzo RN.   Lab work results pending
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
## 2017-12-06 ENCOUNTER — HOSPITAL ENCOUNTER (INPATIENT)
Age: 57
LOS: 14 days | Discharge: HOME | DRG: 850 | End: 2017-12-20
Attending: PHYSICAL MEDICINE & REHABILITATION | Admitting: PHYSICAL MEDICINE & REHABILITATION
Payer: MEDICAID

## 2017-12-06 VITALS
WEIGHT: 205 LBS | TEMPERATURE: 97.2 F | OXYGEN SATURATION: 99 % | HEART RATE: 65 BPM | BODY MASS INDEX: 27.17 KG/M2 | RESPIRATION RATE: 16 BRPM | SYSTOLIC BLOOD PRESSURE: 107 MMHG | DIASTOLIC BLOOD PRESSURE: 67 MMHG | HEIGHT: 73 IN

## 2017-12-06 LAB
ANION GAP SERPL CALCULATED.3IONS-SCNC: 13 MEQ/L (ref 7–13)
BUN BLDV-MCNC: 19 MG/DL (ref 6–20)
CALCIUM SERPL-MCNC: 8.9 MG/DL (ref 8.6–10.2)
CHLORIDE BLD-SCNC: 100 MEQ/L (ref 98–107)
CO2: 24 MEQ/L (ref 22–29)
CREAT SERPL-MCNC: 1.02 MG/DL (ref 0.7–1.2)
GFR AFRICAN AMERICAN: >60
GFR NON-AFRICAN AMERICAN: >60
GLUCOSE BLD-MCNC: 97 MG/DL (ref 74–109)
HCT VFR BLD CALC: 38.7 % (ref 42–52)
HEMOGLOBIN: 13.2 G/DL (ref 14–18)
MCH RBC QN AUTO: 31.3 PG (ref 27–31.3)
MCHC RBC AUTO-ENTMCNC: 34 % (ref 33–37)
MCV RBC AUTO: 92 FL (ref 80–100)
PDW BLD-RTO: 13.6 % (ref 11.5–14.5)
PLATELET # BLD: 223 K/UL (ref 130–400)
POTASSIUM SERPL-SCNC: 4.4 MEQ/L (ref 3.5–5.1)
RBC # BLD: 4.21 M/UL (ref 4.7–6.1)
SODIUM BLD-SCNC: 137 MEQ/L (ref 132–144)
WBC # BLD: 10.5 K/UL (ref 4.8–10.8)

## 2017-12-06 PROCEDURE — 2580000003 HC RX 258: Performed by: ORTHOPAEDIC SURGERY

## 2017-12-06 PROCEDURE — 0LQ30ZZ REPAIR RIGHT UPPER ARM TENDON, OPEN APPROACH: ICD-10-PCS | Performed by: ORTHOPAEDIC SURGERY

## 2017-12-06 PROCEDURE — 1180000000 HC REHAB R&B

## 2017-12-06 PROCEDURE — 97167 OT EVAL HIGH COMPLEX 60 MIN: CPT

## 2017-12-06 PROCEDURE — 6370000000 HC RX 637 (ALT 250 FOR IP): Performed by: INTERNAL MEDICINE

## 2017-12-06 PROCEDURE — 97162 PT EVAL MOD COMPLEX 30 MIN: CPT

## 2017-12-06 PROCEDURE — 97535 SELF CARE MNGMENT TRAINING: CPT

## 2017-12-06 PROCEDURE — 85027 COMPLETE CBC AUTOMATED: CPT

## 2017-12-06 PROCEDURE — 6370000000 HC RX 637 (ALT 250 FOR IP): Performed by: NURSE PRACTITIONER

## 2017-12-06 PROCEDURE — 6370000000 HC RX 637 (ALT 250 FOR IP): Performed by: ORTHOPAEDIC SURGERY

## 2017-12-06 PROCEDURE — 80048 BASIC METABOLIC PNL TOTAL CA: CPT

## 2017-12-06 PROCEDURE — G8978 MOBILITY CURRENT STATUS: HCPCS

## 2017-12-06 PROCEDURE — 0MB30ZZ EXCISION OF RIGHT ELBOW BURSA AND LIGAMENT, OPEN APPROACH: ICD-10-PCS | Performed by: ORTHOPAEDIC SURGERY

## 2017-12-06 PROCEDURE — G8988 SELF CARE GOAL STATUS: HCPCS

## 2017-12-06 PROCEDURE — G8987 SELF CARE CURRENT STATUS: HCPCS

## 2017-12-06 PROCEDURE — 36415 COLL VENOUS BLD VENIPUNCTURE: CPT

## 2017-12-06 PROCEDURE — 2700000000 HC OXYGEN THERAPY PER DAY

## 2017-12-06 PROCEDURE — G8979 MOBILITY GOAL STATUS: HCPCS

## 2017-12-06 RX ORDER — MIRTAZAPINE 15 MG/1
15 TABLET, FILM COATED ORAL NIGHTLY
Status: DISCONTINUED | OUTPATIENT
Start: 2017-12-06 | End: 2017-12-20 | Stop reason: HOSPADM

## 2017-12-06 RX ORDER — ACETAMINOPHEN 325 MG/1
650 TABLET ORAL EVERY 4 HOURS PRN
Status: DISCONTINUED | OUTPATIENT
Start: 2017-12-06 | End: 2017-12-20 | Stop reason: HOSPADM

## 2017-12-06 RX ORDER — DULOXETIN HYDROCHLORIDE 60 MG/1
60 CAPSULE, DELAYED RELEASE ORAL DAILY
Status: CANCELLED | OUTPATIENT
Start: 2017-12-07

## 2017-12-06 RX ORDER — TIMOLOL MALEATE 5 MG/ML
1 SOLUTION/ DROPS OPHTHALMIC 2 TIMES DAILY
Status: DISCONTINUED | OUTPATIENT
Start: 2017-12-06 | End: 2017-12-20 | Stop reason: HOSPADM

## 2017-12-06 RX ORDER — BACLOFEN 10 MG/1
10 TABLET ORAL 2 TIMES DAILY
Status: DISCONTINUED | OUTPATIENT
Start: 2017-12-06 | End: 2017-12-20 | Stop reason: HOSPADM

## 2017-12-06 RX ORDER — ACETAMINOPHEN 325 MG/1
650 TABLET ORAL EVERY 4 HOURS PRN
Status: CANCELLED | OUTPATIENT
Start: 2017-12-06

## 2017-12-06 RX ORDER — TIMOLOL MALEATE 5 MG/ML
1 SOLUTION/ DROPS OPHTHALMIC 2 TIMES DAILY
Status: CANCELLED | OUTPATIENT
Start: 2017-12-06

## 2017-12-06 RX ORDER — PANTOPRAZOLE SODIUM 40 MG/1
40 TABLET, DELAYED RELEASE ORAL DAILY
Status: CANCELLED | OUTPATIENT
Start: 2017-12-07

## 2017-12-06 RX ORDER — OXYCODONE HYDROCHLORIDE AND ACETAMINOPHEN 5; 325 MG/1; MG/1
1 TABLET ORAL EVERY 4 HOURS PRN
Status: DISCONTINUED | OUTPATIENT
Start: 2017-12-06 | End: 2017-12-20 | Stop reason: HOSPADM

## 2017-12-06 RX ORDER — OXYCODONE HYDROCHLORIDE AND ACETAMINOPHEN 5; 325 MG/1; MG/1
1 TABLET ORAL EVERY 4 HOURS PRN
Status: CANCELLED | OUTPATIENT
Start: 2017-12-06

## 2017-12-06 RX ORDER — DORZOLAMIDE HCL 20 MG/ML
1 SOLUTION/ DROPS OPHTHALMIC 2 TIMES DAILY
Status: DISCONTINUED | OUTPATIENT
Start: 2017-12-06 | End: 2017-12-20 | Stop reason: HOSPADM

## 2017-12-06 RX ORDER — OXYCODONE HYDROCHLORIDE AND ACETAMINOPHEN 5; 325 MG/1; MG/1
2 TABLET ORAL EVERY 4 HOURS PRN
Status: DISCONTINUED | OUTPATIENT
Start: 2017-12-06 | End: 2017-12-20 | Stop reason: HOSPADM

## 2017-12-06 RX ORDER — ATORVASTATIN CALCIUM 10 MG/1
10 TABLET, FILM COATED ORAL DAILY
Status: CANCELLED | OUTPATIENT
Start: 2017-12-07

## 2017-12-06 RX ORDER — SENNA AND DOCUSATE SODIUM 50; 8.6 MG/1; MG/1
1 TABLET, FILM COATED ORAL DAILY
Status: CANCELLED | OUTPATIENT
Start: 2017-12-07

## 2017-12-06 RX ORDER — BRIMONIDINE TARTRATE 2 MG/ML
1 SOLUTION/ DROPS OPHTHALMIC 3 TIMES DAILY
Status: DISCONTINUED | OUTPATIENT
Start: 2017-12-06 | End: 2017-12-20 | Stop reason: HOSPADM

## 2017-12-06 RX ORDER — MIRTAZAPINE 15 MG/1
15 TABLET, FILM COATED ORAL NIGHTLY
Status: CANCELLED | OUTPATIENT
Start: 2017-12-06

## 2017-12-06 RX ORDER — DOCUSATE SODIUM 100 MG/1
100 CAPSULE, LIQUID FILLED ORAL 2 TIMES DAILY
Status: DISCONTINUED | OUTPATIENT
Start: 2017-12-06 | End: 2017-12-20 | Stop reason: HOSPADM

## 2017-12-06 RX ORDER — BRIMONIDINE TARTRATE 2 MG/ML
1 SOLUTION/ DROPS OPHTHALMIC 3 TIMES DAILY
Status: CANCELLED | OUTPATIENT
Start: 2017-12-06

## 2017-12-06 RX ORDER — ATORVASTATIN CALCIUM 10 MG/1
10 TABLET, FILM COATED ORAL DAILY
Status: DISCONTINUED | OUTPATIENT
Start: 2017-12-07 | End: 2017-12-20 | Stop reason: HOSPADM

## 2017-12-06 RX ORDER — DORZOLAMIDE HCL 20 MG/ML
1 SOLUTION/ DROPS OPHTHALMIC 2 TIMES DAILY
Status: CANCELLED | OUTPATIENT
Start: 2017-12-06

## 2017-12-06 RX ORDER — DOCUSATE SODIUM 100 MG/1
100 CAPSULE, LIQUID FILLED ORAL 2 TIMES DAILY
Status: CANCELLED | OUTPATIENT
Start: 2017-12-06

## 2017-12-06 RX ORDER — SENNA AND DOCUSATE SODIUM 50; 8.6 MG/1; MG/1
1 TABLET, FILM COATED ORAL DAILY
Status: DISCONTINUED | OUTPATIENT
Start: 2017-12-07 | End: 2017-12-20 | Stop reason: HOSPADM

## 2017-12-06 RX ORDER — OXYCODONE HYDROCHLORIDE AND ACETAMINOPHEN 5; 325 MG/1; MG/1
2 TABLET ORAL EVERY 4 HOURS PRN
Status: CANCELLED | OUTPATIENT
Start: 2017-12-06

## 2017-12-06 RX ORDER — DULOXETIN HYDROCHLORIDE 30 MG/1
60 CAPSULE, DELAYED RELEASE ORAL DAILY
Status: DISCONTINUED | OUTPATIENT
Start: 2017-12-07 | End: 2017-12-20 | Stop reason: HOSPADM

## 2017-12-06 RX ORDER — PANTOPRAZOLE SODIUM 40 MG/1
40 TABLET, DELAYED RELEASE ORAL DAILY
Status: DISCONTINUED | OUTPATIENT
Start: 2017-12-07 | End: 2017-12-20 | Stop reason: HOSPADM

## 2017-12-06 RX ORDER — BACLOFEN 10 MG/1
10 TABLET ORAL 2 TIMES DAILY
Status: CANCELLED | OUTPATIENT
Start: 2017-12-06

## 2017-12-06 RX ORDER — GABAPENTIN 400 MG/1
1200 CAPSULE ORAL 2 TIMES DAILY
Status: CANCELLED | OUTPATIENT
Start: 2017-12-06

## 2017-12-06 RX ORDER — GABAPENTIN 400 MG/1
1200 CAPSULE ORAL 2 TIMES DAILY
Status: DISCONTINUED | OUTPATIENT
Start: 2017-12-06 | End: 2017-12-20 | Stop reason: HOSPADM

## 2017-12-06 RX ADMIN — TIMOLOL MALEATE 1 DROP: 5 SOLUTION OPHTHALMIC at 10:19

## 2017-12-06 RX ADMIN — GABAPENTIN 1200 MG: 300 CAPSULE ORAL at 10:17

## 2017-12-06 RX ADMIN — BACLOFEN 10 MG: 10 TABLET ORAL at 21:17

## 2017-12-06 RX ADMIN — BRIMONIDINE TARTRATE OPHTHALMIC SOLUTION, 0.2% 1 DROP: 2 SOLUTION/ DROPS OPHTHALMIC at 10:19

## 2017-12-06 RX ADMIN — SALINE NASAL SPRAY 1 SPRAY: 1.5 SOLUTION NASAL at 10:19

## 2017-12-06 RX ADMIN — TIMOLOL MALEATE 1 DROP: 5 SOLUTION OPHTHALMIC at 21:25

## 2017-12-06 RX ADMIN — OXYCODONE HYDROCHLORIDE AND ACETAMINOPHEN 2 TABLET: 5; 325 TABLET ORAL at 13:46

## 2017-12-06 RX ADMIN — BACLOFEN 10 MG: 10 TABLET ORAL at 10:17

## 2017-12-06 RX ADMIN — OXYCODONE HYDROCHLORIDE AND ACETAMINOPHEN 2 TABLET: 5; 325 TABLET ORAL at 21:17

## 2017-12-06 RX ADMIN — GABAPENTIN 1200 MG: 400 CAPSULE ORAL at 21:16

## 2017-12-06 RX ADMIN — PANTOPRAZOLE SODIUM 40 MG: 20 TABLET, DELAYED RELEASE ORAL at 10:17

## 2017-12-06 RX ADMIN — OXYCODONE HYDROCHLORIDE AND ACETAMINOPHEN 2 TABLET: 5; 325 TABLET ORAL at 05:54

## 2017-12-06 RX ADMIN — MIRTAZAPINE 15 MG: 15 TABLET, FILM COATED ORAL at 21:17

## 2017-12-06 RX ADMIN — SODIUM CHLORIDE: 9 INJECTION, SOLUTION INTRAVENOUS at 10:17

## 2017-12-06 RX ADMIN — BRIMONIDINE TARTRATE OPHTHALMIC SOLUTION, 0.2% 1 DROP: 2 SOLUTION/ DROPS OPHTHALMIC at 21:25

## 2017-12-06 ASSESSMENT — ENCOUNTER SYMPTOMS
COUGH: 0
SHORTNESS OF BREATH: 0
NAUSEA: 0
VOMITING: 0

## 2017-12-06 ASSESSMENT — PAIN SCALES - GENERAL
PAINLEVEL_OUTOF10: 8
PAINLEVEL_OUTOF10: 4
PAINLEVEL_OUTOF10: 10
PAINLEVEL_OUTOF10: 10

## 2017-12-06 ASSESSMENT — PAIN DESCRIPTION - DESCRIPTORS: DESCRIPTORS: ACHING

## 2017-12-06 NOTE — PROGRESS NOTES
Mod A in UB ADLs    [x]  Be Max A  in LB ADLs   [x]  Be SBA in ADL transfers without LOB   [x]  Be SBA in toileting tasks   []  Improve  hand fine motor coordination to  in order to manage clothing fasteners/self-care containers in a timely manner   []  Improve  UE Function (AROM, strength, motor control, tone normalization) to complete ADLs as projected. [x]  Improve  RUE strength and endurance to [facility] when appropriate  in order to participate in self-care activities as projected. [x]  Access appropriate D/C site with as few architectural barriers as possible.   []  Sequence self-care tasks with    []  Other :      Patient Goal: To do as much for self as possible. Discussed and agreed upon: [x] Yes   [] No         Comments:     Assessment/Discharge Disposition:     Performance deficits / Impairments: Decreased functional mobility , Decreased ADL status, Decreased ROM, Decreased strength, Decreased balance, Decreased coordination, Decreased fine motor control, Decreased vision/visual deficit  Discharge Recommendations: Continue to assess pending progress  History: mulit comorb   Exam: 8 perf imp   Assistance / Modification: Max A    Prognosis:  [] Good   []Fair   [] Poor     Barriers to Improvement:  NWB RUE    Recommended DME:  [] W/W   [] Nedra Molina   [] Rollator   [] W/C   [] Gracy Kwok  [] Shower Chair   [x]Dressing AD []  [facility]  [x] Other: transfer board     Plan:Times per week: 1-3,      G-Codes:  OT G-codes  Functional Limitation: Self care  Self Care Current Status (): At least 80 percent but less than 100 percent impaired, limited or restricted  Self Care Goal Status (): At least 60 percent but less than 80 percent impaired, limited or restricted    Time in:  8:35  Time out:  9:15  Timed treatment minutes:  40  Total treatment time/minutes:  57    Electronically signed by:     Delphine Manzanares OT  12/6/2017, 9:30 AM

## 2017-12-06 NOTE — PROGRESS NOTES
Wheelchair accessible  Home Equipment: Amarin, [address] bed (transport chair)  Receives Help From: Home health, Friend(s), Family (HHA 3x/day. friend lives in same apt building able to be reached with button. Family stored in phone speed dial)  ADL Assistance: Needs assistance (pt attempts to be as indep as possible with ADLs. HHA assists with dressing and bathing)  Homemaking Assistance: Needs assistance (HHA assists with homemaking tasks)  Homemaking Responsibilities: No  Ambulation Assistance: Independent (non-amb, indep with w/c mobility with power assist w/c in home and power chair in community)  Transfer Assistance: Needs assistance (supervision/Mod I with transfers including w/c<>bed, and shower transfers. Occasionally will need some assistance from [facility] PR[facility])  Active : No  Additional Comments: Hx of paraplegia and legally blind. Pt reports that apt is set up to be handicap accessible     OBJECTIVE:   Vision/Hearing:  Vision: Impaired  Vision Exceptions: Legally blind  Hearing: Exceptions to [facility]  Hearing Exceptions: Hard of hearing/hearing concerns; No hearing aid    Cognition:  Overall Orientation Status: Within Normal Limits  Follows Commands: Within Functional Limits    Observation/Palpation  Observation: no acute distress noted    ROM:  RLE PROM: WFL  RLE General PROM: tight gastroc, hamstrings, hip flexors   RLE General AROM: no AROM except toes  LLE PROM: WFL  LLE General PROM: tight gastroc, hamstrings, hip flexors   LLE General AROM: no AROM except toes  RUE AROM : WFL  RUE General AROM: except elbow NT d/t no ROM  LUE AROM : WFL    Strength:  Strength RLE  Comment: hip knee and ankle 0/5, toes 2+/5  Strength LLE  Comment: hip knee and ankle 0/5, toes 2+/5  Strength RUE  Comment: NT  Strength LUE  Strength LUE: WFL  Comment: appropriately uses L UE for transfers  Strength Other  Other: Core strength 3-/5. functionally pt able to assist with supine->sit transfer with

## 2017-12-06 NOTE — PLAN OF CARE
Problem: IP DRESSING UPPER EXTREMITIES  Goal: LTG - Patient will dress upper body from seated position  Outcome: Ongoing  Mod A or better

## 2017-12-06 NOTE — PROGRESS NOTES
Frieda Camacho is a 56 y.o. male patient.  Pt was seen and evaluated, no overnight events, no new complaints    Current Facility-Administered Medications   Medication Dose Route Frequency Provider Last Rate Last Dose    0.9 % sodium chloride infusion   Intravenous Continuous Sheryl Campa MD 50 mL/hr at 12/06/17 1017      sodium chloride flush 0.9 % injection 10 mL  10 mL Intravenous 2 times per day Sheryl Campa MD        sodium chloride flush 0.9 % injection 10 mL  10 mL Intravenous PRN Sheryl Campa MD        acetaminophen (TYLENOL) tablet 650 mg  650 mg Oral Q4H PRN Sheryl Campa MD        oxyCODONE-acetaminophen (PERCOCET) 5-325 MG per tablet 1 tablet  1 tablet Oral Q4H PRN Sheryl Campa MD        Or    oxyCODONE-acetaminophen (PERCOCET) 5-325 MG per tablet 2 tablet  2 tablet Oral Q4H PRN Sheryl Campa MD   2 tablet at 12/06/17 0554    morphine injection 2 mg  2 mg Intravenous Q2H PRN Sheryl Campa MD        Or    morphine injection 4 mg  4 mg Intravenous Q2H PRN Sheryl Campa MD        docusate sodium (COLACE) capsule 100 mg  100 mg Oral BID Sheryl Campa MD   100 mg at 12/05/17 2054    magnesium hydroxide (MILK OF MAGNESIA) 400 MG/5ML suspension 30 mL  30 mL Oral Daily PRN Sheryl Campa MD        sennosides-docusate sodium (SENOKOT-S) 8.6-50 MG tablet 1 tablet  1 tablet Oral Daily Sheryl Campa MD   1 tablet at 12/05/17 2054    ondansetron (ZOFRAN) injection 4 mg  4 mg Intravenous Q6H PRN Sheryl Campa MD        brimonidine (ALPHAGAN) 0.2 % ophthalmic solution 1 drop  1 drop Both Eyes TID Veronica Silva MD   1 drop at 12/06/17 1019    atorvastatin (LIPITOR) tablet 10 mg  10 mg Oral Daily Veronica Silva MD   10 mg at 12/05/17 2054    baclofen (LIORESAL) tablet 10 mg  10 mg Oral BID Veronica Silva MD   10 mg at 12/06/17 1017    docusate sodium (COLACE) capsule 100 mg  100 mg Oral BID PRN Veronica Silva MD        mirtazapine (REMERON) tablet 15 mg  15 mg Oral Nightly Veronica Silva MD   15 mg at 12/05/17 2054    pantoprazole (PROTONIX) tablet 40 mg  40 mg Oral Daily Vilma Rodriguez MD   40 mg at 12/06/17 1017    gabapentin (NEURONTIN) capsule 1,200 mg  1,200 mg Oral BID Vilma Rodriguez MD   1,200 mg at 12/06/17 1017    DULoxetine (CYMBALTA) extended release capsule 60 mg  60 mg Oral Daily Vilma Rodriguez MD   60 mg at 12/05/17 2055    dorzolamide (TRUSOPT) 2 % ophthalmic solution 1 drop  1 drop Both Eyes BID Vilma Rodriguez MD        timolol (TIMOPTIC) 0.5 % ophthalmic solution 1 drop  1 drop Both Eyes BID Vilma Rodriguez MD   1 drop at 12/06/17 1019    sodium chloride (OCEAN, BABY AYR) 0.65 % nasal spray 1 spray  1 spray Each Nare PRN Manuel Lerma MD   1 spray at 12/06/17 1019     Allergies   Allergen Reactions    Pcn [Penicillins] Itching     Active Problems:    Triceps tendon rupture, right, initial encounter    Blood pressure 107/67, pulse 65, temperature 97.2 °F (36.2 °C), resp. rate 16, height 6' 1\" (1.854 m), weight 205 lb (93 kg), SpO2 99 %. Subjective:  Symptoms:  No shortness of breath, malaise, cough, chest pain, weakness or headache. Diet:  No nausea or vomiting. Objective:  General Appearance:  Comfortable, well-appearing and in no acute distress. Vital signs: (most recent): Blood pressure 107/67, pulse 65, temperature 97.2 °F (36.2 °C), resp. rate 16, height 6' 1\" (1.854 m), weight 205 lb (93 kg), SpO2 99 %. Lungs:  Normal effort and normal respiratory rate. Heart: Normal rate. Regular rhythm. S1 normal and S2 normal.    Abdomen: Abdomen is soft. Bowel sounds are normal.     Neurological: Patient is alert. Skin:  Warm and dry.       Lab Results   Component Value Date    WBC 10.5 12/06/2017    HGB 13.2 (L) 12/06/2017    HCT 38.7 (L) 12/06/2017    MCV 92.0 12/06/2017     12/06/2017     Lab Results   Component Value Date     12/05/2017    K 4.2 12/05/2017     12/05/2017    CO2 26 12/05/2017    BUN 10 12/05/2017    CREATININE 0.93 12/05/2017 GLUCOSE 96 12/05/2017    CALCIUM 9.5 12/05/2017        Assessment & Plan  Encounter for post surgical care  2) HTN stable  3) asthma stable  Tonie Herbert MD  12/6/2017

## 2017-12-07 PROBLEM — M54.41 CHRONIC BILATERAL LOW BACK PAIN WITH BILATERAL SCIATICA: Status: ACTIVE | Noted: 2017-12-07

## 2017-12-07 PROBLEM — M54.42 CHRONIC BILATERAL LOW BACK PAIN WITH BILATERAL SCIATICA: Status: ACTIVE | Noted: 2017-12-07

## 2017-12-07 PROBLEM — G89.29 CHRONIC BILATERAL LOW BACK PAIN WITH BILATERAL SCIATICA: Status: ACTIVE | Noted: 2017-12-07

## 2017-12-07 PROBLEM — R41.89 COGNITIVE DEFICITS: Status: ACTIVE | Noted: 2017-12-07

## 2017-12-07 PROBLEM — R26.9 ABNORMALITY OF GAIT AND MOBILITY: Status: ACTIVE | Noted: 2017-12-07

## 2017-12-07 PROCEDURE — 92610 EVALUATE SWALLOWING FUNCTION: CPT

## 2017-12-07 PROCEDURE — 99222 1ST HOSP IP/OBS MODERATE 55: CPT | Performed by: PHYSICAL MEDICINE & REHABILITATION

## 2017-12-07 PROCEDURE — 97112 NEUROMUSCULAR REEDUCATION: CPT

## 2017-12-07 PROCEDURE — 97167 OT EVAL HIGH COMPLEX 60 MIN: CPT

## 2017-12-07 PROCEDURE — 6370000000 HC RX 637 (ALT 250 FOR IP): Performed by: NURSE PRACTITIONER

## 2017-12-07 PROCEDURE — 1180000000 HC REHAB R&B

## 2017-12-07 PROCEDURE — 6370000000 HC RX 637 (ALT 250 FOR IP): Performed by: PHYSICAL MEDICINE & REHABILITATION

## 2017-12-07 PROCEDURE — 97110 THERAPEUTIC EXERCISES: CPT

## 2017-12-07 PROCEDURE — 97530 THERAPEUTIC ACTIVITIES: CPT

## 2017-12-07 PROCEDURE — 97535 SELF CARE MNGMENT TRAINING: CPT

## 2017-12-07 PROCEDURE — 92523 SPEECH SOUND LANG COMPREHEN: CPT

## 2017-12-07 RX ORDER — SODIUM PHOSPHATE, DIBASIC AND SODIUM PHOSPHATE, MONOBASIC 7; 19 G/133ML; G/133ML
1 ENEMA RECTAL
Status: DISPENSED | OUTPATIENT
Start: 2017-12-07 | End: 2017-12-07

## 2017-12-07 RX ORDER — BISACODYL 10 MG
10 SUPPOSITORY, RECTAL RECTAL DAILY PRN
Status: DISCONTINUED | OUTPATIENT
Start: 2017-12-07 | End: 2017-12-18 | Stop reason: DRUGHIGH

## 2017-12-07 RX ORDER — LIDOCAINE 40 MG/G
CREAM TOPICAL PRN
Status: DISCONTINUED | OUTPATIENT
Start: 2017-12-07 | End: 2017-12-08

## 2017-12-07 RX ORDER — LIDOCAINE 50 MG/G
3 PATCH TOPICAL DAILY
Status: DISCONTINUED | OUTPATIENT
Start: 2017-12-07 | End: 2017-12-20 | Stop reason: HOSPADM

## 2017-12-07 RX ADMIN — TIMOLOL MALEATE 1 DROP: 5 SOLUTION OPHTHALMIC at 20:02

## 2017-12-07 RX ADMIN — BRIMONIDINE TARTRATE OPHTHALMIC SOLUTION, 0.2% 1 DROP: 2 SOLUTION/ DROPS OPHTHALMIC at 08:12

## 2017-12-07 RX ADMIN — BACLOFEN 10 MG: 10 TABLET ORAL at 20:02

## 2017-12-07 RX ADMIN — TIMOLOL MALEATE 1 DROP: 5 SOLUTION OPHTHALMIC at 08:12

## 2017-12-07 RX ADMIN — DULOXETINE 60 MG: 30 CAPSULE, DELAYED RELEASE ORAL at 08:12

## 2017-12-07 RX ADMIN — SENNOSIDES AND DOCUSATE SODIUM 1 TABLET: 8.6; 5 TABLET ORAL at 08:14

## 2017-12-07 RX ADMIN — BRIMONIDINE TARTRATE OPHTHALMIC SOLUTION, 0.2% 1 DROP: 2 SOLUTION/ DROPS OPHTHALMIC at 13:20

## 2017-12-07 RX ADMIN — GABAPENTIN 1200 MG: 400 CAPSULE ORAL at 08:13

## 2017-12-07 RX ADMIN — GABAPENTIN 1200 MG: 400 CAPSULE ORAL at 20:01

## 2017-12-07 RX ADMIN — MIRTAZAPINE 15 MG: 15 TABLET, FILM COATED ORAL at 20:02

## 2017-12-07 RX ADMIN — PANTOPRAZOLE SODIUM 40 MG: 40 TABLET, DELAYED RELEASE ORAL at 08:14

## 2017-12-07 RX ADMIN — OXYCODONE HYDROCHLORIDE AND ACETAMINOPHEN 2 TABLET: 5; 325 TABLET ORAL at 01:11

## 2017-12-07 RX ADMIN — BACLOFEN 10 MG: 10 TABLET ORAL at 08:13

## 2017-12-07 RX ADMIN — BRIMONIDINE TARTRATE OPHTHALMIC SOLUTION, 0.2% 1 DROP: 2 SOLUTION/ DROPS OPHTHALMIC at 20:02

## 2017-12-07 RX ADMIN — ATORVASTATIN CALCIUM 10 MG: 10 TABLET, FILM COATED ORAL at 08:13

## 2017-12-07 RX ADMIN — OXYCODONE HYDROCHLORIDE AND ACETAMINOPHEN 2 TABLET: 5; 325 TABLET ORAL at 08:13

## 2017-12-07 RX ADMIN — OXYCODONE HYDROCHLORIDE AND ACETAMINOPHEN 2 TABLET: 5; 325 TABLET ORAL at 13:20

## 2017-12-07 RX ADMIN — OXYCODONE HYDROCHLORIDE AND ACETAMINOPHEN 2 TABLET: 5; 325 TABLET ORAL at 20:14

## 2017-12-07 ASSESSMENT — PAIN DESCRIPTION - LOCATION: LOCATION: ELBOW;ARM

## 2017-12-07 ASSESSMENT — PAIN SCALES - GENERAL
PAINLEVEL_OUTOF10: 8
PAINLEVEL_OUTOF10: 7
PAINLEVEL_OUTOF10: 8
PAINLEVEL_OUTOF10: 7
PAINLEVEL_OUTOF10: 9

## 2017-12-07 ASSESSMENT — ENCOUNTER SYMPTOMS
NAUSEA: 0
CONSTIPATION: 0
SHORTNESS OF BREATH: 0
WHEEZING: 0
EYE REDNESS: 0
PHOTOPHOBIA: 0
EYE PAIN: 0
COLOR CHANGE: 0
ABDOMINAL PAIN: 0
ABDOMINAL DISTENTION: 0
FACIAL SWELLING: 0
BLOOD IN STOOL: 0
CHOKING: 0
CHEST TIGHTNESS: 0
ANAL BLEEDING: 0
VOMITING: 0
TROUBLE SWALLOWING: 0
COUGH: 0

## 2017-12-07 ASSESSMENT — PAIN DESCRIPTION - ORIENTATION: ORIENTATION: RIGHT
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
## 2017-12-07 NOTE — PROGRESS NOTES
SPEECH/LANGUAGE PATHOLOGY  BEDSIDE SWALLOWING EVALUATION    PATIENT NAME:  Chava Pinto      :  1960      TODAY'S DATE:  2017  [facility]    Time in: 1300    Time out: 1320      [x]The admitting diagnosis and active problem list, as listed below have been reviewed prior to initiation of this evaluation.   Abnormality of gait and mobility [R26.9]  Patient Active Problem List   Diagnosis    Triceps tendon rupture, right, initial encounter    Disorder of muscle, ligament, and fascia    Lack of coordination    Paraplegia ([address]    Abnormality of gait and mobility w/paraplegia due to Rt triceps tendon rupture s/p repair, [facility] admit 17    Asthma    Hypertension    Legally blind    MRSA infection within last 3 months    Hard of hearing    Cognitive deficits    Chronic bilateral low back pain with bilateral sciatica     Allergies   Allergen Reactions    Pcn [Penicillins] Itching           DYSPHAGIA DIAGNOSIS:   WFL     DIET RECOMMENDATIONS: reg/thin    FEEDING RECOMMENDATIONS:    []No assistance required   []Stand by assist    []Full assistance required  [x]Set up required    []Supervision with all PO intake [x]Up at 90     []Double swallow    []Chin tuck   []Multiple swallow     []Small bites/sips      []Alternate solids / liquids  []Check for oral pocketing   []No straw    []Throat clear       []Spoon sip liquids   []Effortful swallow       THERAPY RECOMMENDATIONS:   [x]  Therapy is not recommended     []  Therapy is recommended to:    []  Improve oral motor strength and range of motion    []  Improve tongue base retraction     []  Improve laryngeal strength and range of motion     []  Mealtime assessment of patient's tolerance of prescribed diet     []  Repeat bedside swallow study in    []  Modified Barium Swallow (MBS) is recommended and requires a Physician order    [x]  Dasia Mayorga RN notified   []  Dr. Alberto Mata notified via voicemail - NA  []  OT/PT Departments notified via

## 2017-12-08 LAB
ANAEROBIC CULTURE: NORMAL
ANION GAP SERPL CALCULATED.3IONS-SCNC: 10 MEQ/L (ref 7–13)
BUN BLDV-MCNC: 22 MG/DL (ref 6–20)
CALCIUM SERPL-MCNC: 9.1 MG/DL (ref 8.6–10.2)
CHLORIDE BLD-SCNC: 104 MEQ/L (ref 98–107)
CO2: 28 MEQ/L (ref 22–29)
CREAT SERPL-MCNC: 0.96 MG/DL (ref 0.7–1.2)
CULTURE SURGICAL: NORMAL
GFR AFRICAN AMERICAN: >60
GFR NON-AFRICAN AMERICAN: >60
GLUCOSE BLD-MCNC: 100 MG/DL (ref 74–109)
GRAM STAIN RESULT: NORMAL
HCT VFR BLD CALC: 37.2 % (ref 42–52)
HEMOGLOBIN: 12.8 G/DL (ref 14–18)
MCH RBC QN AUTO: 31.4 PG (ref 27–31.3)
MCHC RBC AUTO-ENTMCNC: 34.3 % (ref 33–37)
MCV RBC AUTO: 91.6 FL (ref 80–100)
PDW BLD-RTO: 13.7 % (ref 11.5–14.5)
PLATELET # BLD: 226 K/UL (ref 130–400)
POTASSIUM SERPL-SCNC: 4.8 MEQ/L (ref 3.5–5.1)
RBC # BLD: 4.06 M/UL (ref 4.7–6.1)
SODIUM BLD-SCNC: 142 MEQ/L (ref 132–144)
WBC # BLD: 6.4 K/UL (ref 4.8–10.8)

## 2017-12-08 PROCEDURE — 6370000000 HC RX 637 (ALT 250 FOR IP): Performed by: PHYSICAL MEDICINE & REHABILITATION

## 2017-12-08 PROCEDURE — 80048 BASIC METABOLIC PNL TOTAL CA: CPT

## 2017-12-08 PROCEDURE — 97535 SELF CARE MNGMENT TRAINING: CPT

## 2017-12-08 PROCEDURE — 1180000000 HC REHAB R&B

## 2017-12-08 PROCEDURE — 85027 COMPLETE CBC AUTOMATED: CPT

## 2017-12-08 PROCEDURE — 97110 THERAPEUTIC EXERCISES: CPT

## 2017-12-08 PROCEDURE — 6360000002 HC RX W HCPCS: Performed by: PHYSICAL MEDICINE & REHABILITATION

## 2017-12-08 PROCEDURE — 6370000000 HC RX 637 (ALT 250 FOR IP): Performed by: NURSE PRACTITIONER

## 2017-12-08 PROCEDURE — 97112 NEUROMUSCULAR REEDUCATION: CPT

## 2017-12-08 PROCEDURE — 99233 SBSQ HOSP IP/OBS HIGH 50: CPT | Performed by: PHYSICAL MEDICINE & REHABILITATION

## 2017-12-08 PROCEDURE — 36415 COLL VENOUS BLD VENIPUNCTURE: CPT

## 2017-12-08 RX ORDER — LIDOCAINE 40 MG/G
CREAM TOPICAL 2 TIMES DAILY
Status: DISCONTINUED | OUTPATIENT
Start: 2017-12-08 | End: 2017-12-20 | Stop reason: HOSPADM

## 2017-12-08 RX ORDER — CHLORHEXIDINE GLUCONATE 0.12 MG/ML
15 RINSE ORAL 2 TIMES DAILY
Status: DISCONTINUED | OUTPATIENT
Start: 2017-12-08 | End: 2017-12-20 | Stop reason: HOSPADM

## 2017-12-08 RX ORDER — UBIDECARENONE 100 MG
100 CAPSULE ORAL 2 TIMES DAILY
Status: DISCONTINUED | OUTPATIENT
Start: 2017-12-08 | End: 2017-12-20 | Stop reason: HOSPADM

## 2017-12-08 RX ORDER — OXYCODONE AND ACETAMINOPHEN 10; 325 MG/1; MG/1
1 TABLET ORAL
Status: DISCONTINUED | OUTPATIENT
Start: 2017-12-08 | End: 2017-12-20 | Stop reason: HOSPADM

## 2017-12-08 RX ORDER — ERGOCALCIFEROL 1.25 MG/1
50000 CAPSULE ORAL DAILY
Status: COMPLETED | OUTPATIENT
Start: 2017-12-08 | End: 2017-12-10

## 2017-12-08 RX ORDER — CYANOCOBALAMIN 1000 UG/ML
1000 INJECTION INTRAMUSCULAR; SUBCUTANEOUS WEEKLY
Status: DISCONTINUED | OUTPATIENT
Start: 2017-12-08 | End: 2017-12-20 | Stop reason: HOSPADM

## 2017-12-08 RX ADMIN — BACLOFEN 10 MG: 10 TABLET ORAL at 09:28

## 2017-12-08 RX ADMIN — TIMOLOL MALEATE 1 DROP: 5 SOLUTION OPHTHALMIC at 21:15

## 2017-12-08 RX ADMIN — BACLOFEN 10 MG: 10 TABLET ORAL at 21:15

## 2017-12-08 RX ADMIN — Medication 15 ML: at 21:23

## 2017-12-08 RX ADMIN — OXYCODONE HYDROCHLORIDE AND ACETAMINOPHEN 1 TABLET: 10; 325 TABLET ORAL at 14:38

## 2017-12-08 RX ADMIN — SENNOSIDES AND DOCUSATE SODIUM 1 TABLET: 8.6; 5 TABLET ORAL at 09:28

## 2017-12-08 RX ADMIN — DOCUSATE SODIUM 100 MG: 100 CAPSULE, LIQUID FILLED ORAL at 09:27

## 2017-12-08 RX ADMIN — ERGOCALCIFEROL 50000 UNITS: 1.25 CAPSULE ORAL at 14:37

## 2017-12-08 RX ADMIN — OXYCODONE HYDROCHLORIDE AND ACETAMINOPHEN 2 TABLET: 5; 325 TABLET ORAL at 00:17

## 2017-12-08 RX ADMIN — TIMOLOL MALEATE 1 DROP: 5 SOLUTION OPHTHALMIC at 09:26

## 2017-12-08 RX ADMIN — ATORVASTATIN CALCIUM 10 MG: 10 TABLET, FILM COATED ORAL at 09:37

## 2017-12-08 RX ADMIN — CYANOCOBALAMIN 1000 MCG: 1000 INJECTION, SOLUTION INTRAMUSCULAR at 14:37

## 2017-12-08 RX ADMIN — BRIMONIDINE TARTRATE OPHTHALMIC SOLUTION, 0.2% 1 DROP: 2 SOLUTION/ DROPS OPHTHALMIC at 14:44

## 2017-12-08 RX ADMIN — Medication 100 MG: at 21:14

## 2017-12-08 RX ADMIN — OXYCODONE HYDROCHLORIDE AND ACETAMINOPHEN 1 TABLET: 10; 325 TABLET ORAL at 17:01

## 2017-12-08 RX ADMIN — DOCUSATE SODIUM 100 MG: 100 CAPSULE, LIQUID FILLED ORAL at 21:15

## 2017-12-08 RX ADMIN — GABAPENTIN 1200 MG: 400 CAPSULE ORAL at 09:26

## 2017-12-08 RX ADMIN — OXYCODONE HYDROCHLORIDE AND ACETAMINOPHEN 2 TABLET: 5; 325 TABLET ORAL at 09:27

## 2017-12-08 RX ADMIN — PANTOPRAZOLE SODIUM 40 MG: 40 TABLET, DELAYED RELEASE ORAL at 09:28

## 2017-12-08 RX ADMIN — MIRTAZAPINE 15 MG: 15 TABLET, FILM COATED ORAL at 21:15

## 2017-12-08 RX ADMIN — DULOXETINE 60 MG: 30 CAPSULE, DELAYED RELEASE ORAL at 09:27

## 2017-12-08 RX ADMIN — GABAPENTIN 1200 MG: 400 CAPSULE ORAL at 21:15

## 2017-12-08 RX ADMIN — DORZOLAMIDE HYDROCHLORIDE 1 DROP: 20 SOLUTION/ DROPS OPHTHALMIC at 21:29

## 2017-12-08 RX ADMIN — BRIMONIDINE TARTRATE OPHTHALMIC SOLUTION, 0.2% 1 DROP: 2 SOLUTION/ DROPS OPHTHALMIC at 21:15

## 2017-12-08 RX ADMIN — Medication 100 MG: at 14:38

## 2017-12-08 RX ADMIN — BRIMONIDINE TARTRATE OPHTHALMIC SOLUTION, 0.2% 1 DROP: 2 SOLUTION/ DROPS OPHTHALMIC at 09:25

## 2017-12-08 RX ADMIN — OXYCODONE HYDROCHLORIDE AND ACETAMINOPHEN 2 TABLET: 5; 325 TABLET ORAL at 19:19

## 2017-12-08 ASSESSMENT — PAIN DESCRIPTION - DESCRIPTORS
DESCRIPTORS: SORE
DESCRIPTORS: SORE;SHOOTING;SHARP
DESCRIPTORS: SORE
DESCRIPTORS: SORE

## 2017-12-08 ASSESSMENT — PAIN DESCRIPTION - ORIENTATION
ORIENTATION: RIGHT

## 2017-12-08 ASSESSMENT — PAIN SCALES - GENERAL
PAINLEVEL_OUTOF10: 9
PAINLEVEL_OUTOF10: 8
PAINLEVEL_OUTOF10: 0
PAINLEVEL_OUTOF10: 9
PAINLEVEL_OUTOF10: 7
PAINLEVEL_OUTOF10: 8

## 2017-12-08 ASSESSMENT — PAIN DESCRIPTION - PAIN TYPE
TYPE: ACUTE PAIN
TYPE: ACUTE PAIN

## 2017-12-08 ASSESSMENT — PAIN DESCRIPTION - LOCATION
LOCATION: SHOULDER
LOCATION: SHOULDER
LOCATION: ARM

## 2017-12-08 ASSESSMENT — PAIN DESCRIPTION - FREQUENCY
FREQUENCY: CONTINUOUS
FREQUENCY: INTERMITTENT
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
## 2017-12-08 NOTE — PROGRESS NOTES
Hospitalist Progress Note      PCP: Jose Luis Becerra DO    Date of Admission: 12/6/2017    Chief Complaint:  Patent has no complaints today. He states his right arm pain is being managed with his prn medications and PT/OT is going well. He denies CP/SOB/palpitations/fever/chills/N/V/D. Subjective: :Constitutional: No fever, chills, weakness, otherwise negative  Eyes: No eye pain or redness, otherwise negative  Ears, nose, mouth, throat, and face: No ear ache, no nose bleed no sore throat otherwise negative  Respiratory: No cough, sob, hemoptysis, otherwise  negative  Cardiovascular: No chest pain, palpitation, otherwise negative  Gastrointestinal: No nausea, vomiting diarrhea otherwise negative  Genitourinary:No hematuria, no dysuria, no frequency otherwise negative  Integument/breast: No pain, discomfort, redness otherwise negative  Hematologic/lymphatic: No bleed, lymph node swelling, petechia otherwise negative  Musculoskeletal:No back, muscle or joint pain swelling, otherwise negative  Neurological: No headache, seizure, loss of consciousness otherwise negative  Behavioral/Psych: No depression, suicidal or homicidal ideation otherwise negative  Endocrine: No thyroid pain, warmth or tenderness.  No wt loss otherwise negative  Allergic/Immunologic: No sneezing, itching or rash otherwise negative        Medications:  Reviewed    Infusion Medications    Scheduled Medications    chlorhexidine  15 mL Mouth/Throat BID    oxyCODONE-acetaminophen  1 tablet Oral TID WC    cyanocobalamin  1,000 mcg Intramuscular Weekly    vitamin D  50,000 Units Oral Daily    coenzyme Q10  100 mg Oral BID    lidocaine  3 patch Transdermal Daily    docusate sodium  100 mg Oral BID    sennosides-docusate sodium  1 tablet Oral Daily    atorvastatin  10 mg Oral Daily    baclofen  10 mg Oral BID    brimonidine  1 drop Both Eyes TID    dorzolamide  1 drop Both Eyes BID    DULoxetine  60 mg Oral Daily    gabapentin  1,200 mg Oral with PA.  Agree with assessment and plan  Electronically signed by Mayelin Chambers MD on 12/8/17 at 6:01 PM            Electronically signed by CAM Nelson on 12/8/2017 at 3:06 PM

## 2017-12-08 NOTE — PROGRESS NOTES
Physical Therapy Rehab Treatment Note  Facility/Department: [facility]  Room: E579/Q285-52       NAME: Yoli Katz  : 1960 (56 y.o.)  MRN: [id number]  CODE STATUS: Full Code    Date of Service: 2017       Restrictions:  Restrictions/Precautions: Fall Risk (NWB R UE; no R shoulder flexion past 90degrees; no R shoulder ROM past midline; No R shoulder ext past neutral)  Body mass index is 27.05 kg/m². SUBJECTIVE: Subjective: Pt states he is tring to will his pain away. Pain Screening  Patient Currently in Pain: Yes  Pre Treatment Pain Screening  Pain at present: 8  Intervention List: Patient able to continue with treatment    Post Treatment Pain Screening:  Pain Assessment  Pain Assessment: 0-10  Pain Level: 8  Pain Location: Shoulder  Pain Orientation: Right  Pain Descriptors: Sore  Pt declined intervention. States the pain is easing up. OBJECTIVE:       Bed mobility  Sit to Supine: Minimal assistance  Comment: Pt raises HOB up and utilizes L rail to assist with supine to sit. Completes with increased effort and time. Transfers  Bed to Chair: Maximum assistance  Comment: +2 with slide board. Pt uses L UE to assist with pulling self over. Assist for foot placement and anterior wt shift. Cues to not use R UE. Practiced placing LEs onto foot rests with L UE only multiple times. Minimal assist to complete. Exercises  Core Strengthening: Seated edge of mat with  perturbances. Seated egde of bed reaching with L UE out of DACIA to challange balance and improve wt shift. Seated wt shifts. ASSESSMENT/COMMENTS:Frequent cues for NWBing and ROM restrictions of R UE during functional movements. Time and effort to complete above functional tasks.           PLAN OF CARE/Safety: Cues for safety           Therapy Time:   Individual   Time In 1330   Time Out 1400   Minutes 30     Minutes:60      Transfer/Bed mobility trainin      Gait training:      Neuro re education:20
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
## 2017-12-08 NOTE — PROGRESS NOTES
Physical Therapy Rehab Treatment Note  Facility/Department: [facility]  Room: [facility]/R567-06       NAME: Frieda Camacho  : 1960 (56 y.o.)  MRN: [id number]  CODE STATUS: Full Code    Date of Service: 2017       Restrictions:  Restrictions/Precautions: Fall Risk (NWB R UE; no R shoulder flexion past 90degrees; no R shoulder ROM past midline; No R shoulder ext past neutral)  Body mass index is 27.05 kg/m². SUBJECTIVE:   Pain Screening  Patient Currently in Pain: Yes  Pre Treatment Pain Screening  Pain at present: 8  Intervention List: Patient able to continue with treatment    Post Treatment Pain Screening: declines intervention  Pain Assessment  Pain Assessment: 0-10  Pain Level: 7  Pain Location: Shoulder  Pain Orientation: Right  Pain Descriptors: Sore    OBJECTIVE:           Balance  Comments: Seated unsupprted beach ball tap SBA to increase core strength. Bed mobility  Rolling to Left: Stand by assistance  Supine to Sit: Contact guard assistance  Sit to Supine: Contact guard assistance  Comment: Pt raises HOB up and pulls up with rail. Pt attempts to utilized R UE for momentum crossing mid line. Cues to decrease. Time and effort to complete. Transfers  Bed to Chair: Maximum assistance (sl board +1)  Comment: +2 with slide board. Pt uses L UE to assist with pulling self over. Assist for foot placement and anterior wt shift. Cues to not use R UE. Exercises  Core Strengthening: Seated edge of mat with  perturbances. Seated egde of bed reaching with L UE out of DACIA to challange balance and improve wt shift. ASSESSMENT/COMMENTS:  Postural instability noted with seated core ex but no LOB.                Therapy Time:   Individual   Time In 1330   Time Out 1400   Minutes 30     Minutes:30      Transfer/Bed mobility trainin      Gait training:      Neuro re education:     Therapeutic ex:10      Bhupendra Vincent PTA, 17 at 4:44 PM

## 2017-12-08 NOTE — PROGRESS NOTES
Occupational Therapy  Facility/Department: [facility]  Daily Treatment Note  NAME: Aylin Valdez  : 1960  MRN: [id number]    Date of Service: 2017    Patient Diagnosis(es): There were no encounter diagnoses. has a past medical history of Abnormality of gait and mobility w/paraplegia due to Rt triceps tendon rupture s/p repair, [facility] Re[facility] admit 17; Asthma; Hard of hearing; Hypertension; Legally blind; MRSA infection within last 3 months; Paraplegia ([facility] [address] and Triceps tendon rupture, right, initial encounter. has a past surgical history that includes Biceps tendon repair (Right, 2017). Restrictions  Restrictions/Precautions  Restrictions/Precautions: Fall Risk (NWB R UE; no ROM in splint. OK for ROM R hand)  Upper Extremity Weight Bearing Restrictions  Right Upper Extremity Weight Bearing: Non Weight Bearing  Other: no ROM R elbow, no ROM R shoulder. OK ROM in hand/fingers   Subjective   General  Chart Reviewed: Yes  Pre Treatment Pain Screening  Pain at present: 4  Scale Used: Numeric Score  Intervention List: Patient able to continue with treatment;Nurse called to administer meds  Pain Assessment  Patient Currently in Pain: Yes  Pain Assessment: 0-10  Pain Level: 8  Pain Type: Acute pain  Pain Location: Arm  Pain Orientation: Right  Pain Descriptors: Sore; Shooting; Sharp  Pain Frequency: Continuous  Vital Signs  Patient Currently in Pain: Yes   Orientation     Objective    ADL  Feeding: Setup  Grooming: Setup  UE Bathing: Minimal assistance  LE Bathing: Maximum assistance  UE Dressing: None (gown only)  LE Dressing: Dependent/Total (socks and underwear only)  Toileting: None    Toilet Transfers  Toilet Transfer: Unable to assess  Shower Transfers  Shower Transfers: Not tested  Bed mobility  Rolling to Left: Minimal assistance  Rolling to Right: Supervision     Cognition  Overall Cognitive Status: WFL      Pt. missed 10 minutes during ADL time for breakfast.      Assessment

## 2017-12-08 NOTE — PROGRESS NOTES
Physical Therapy Rehab Treatment Note  Facility/Department: [facility]  Room: M990/K343-86       NAME: Leanne Ramirez  : 1960 (56 y.o.)  MRN: [id number]  CODE STATUS: Full Code    Date of Service: 2017         Restrictions: Body mass index is 27.05 kg/m². SUBJECTIVE: Subjective: \"I am hoping that I will be getting a power chair. \"  Pain Screening  Patient Currently in Pain: Yes  Pre Treatment Pain Screening  Pain at present: 7  Scale Used: Numeric Score  Intervention List: Patient able to continue with treatment;Patient declined any intervention    Post Treatment Pain Screening:  Pain Assessment  Pain Assessment: 0-10  Pain Level: 8  Pain Type: Acute pain  Pain Orientation: Right  Pain Descriptors: Sore  Pain Frequency: Intermittent    OBJECTIVE:   Overall Orientation Status: Within Normal Limits    Bed mobility  Rolling to Right: Supervision  Supine to Sit: Contact guard assistance  Sit to Supine: Minimal assistance  Comment: HOB raised, use of handrails. Teach back method used throughout session. Vc ~30% of set up and sequencing. increased time and effort noted to complete transfers    Transfers with transfer board from various surfaces to facilitate improved functional independence. Lateral Transfers: Minimal Assistance; Moderate Assistance;Maximum Assistance  Comment: 1 person assist this afternoon. Focus of session on teach back method and pt problem solving for safe set up of transfer and safe sequencing during transfer, bed to wc with Min A, w/c to mat with min A. mat to w/c with Min A for ft placement and then CGA for transferboard transfer, Max A for w/c bed transfer (pt fatigued at end of session). Pt requires assistance with placing B LEs for transfers. Increased time and effort noted, seated rest breaks between trials. ASSESSMENT:  Assessment: Pt demonstrating good participation in therapy and motivated for safe return home.  Teach back method used through out session for sequencing of transfers with good carryover within session. Level of assist varies d/t fatigue. PLAN OF CARE:   Plan Comment: Focus on transfer training and core strengthening. Safety Devices  Type of devices: All fall risk precautions in place; Left in bed;Nurse notified         Therapy Time:   Individual   Time In 1600   Time Out 1640   Minutes 40           Transfers and bed mob 40 minutes    Cecilia Fenton, 12/08/17 at 5:05 PM

## 2017-12-09 PROCEDURE — 97112 NEUROMUSCULAR REEDUCATION: CPT

## 2017-12-09 PROCEDURE — 1180000000 HC REHAB R&B

## 2017-12-09 PROCEDURE — 6370000000 HC RX 637 (ALT 250 FOR IP): Performed by: NURSE PRACTITIONER

## 2017-12-09 PROCEDURE — 99232 SBSQ HOSP IP/OBS MODERATE 35: CPT | Performed by: PHYSICAL MEDICINE & REHABILITATION

## 2017-12-09 PROCEDURE — 6370000000 HC RX 637 (ALT 250 FOR IP): Performed by: PHYSICAL MEDICINE & REHABILITATION

## 2017-12-09 PROCEDURE — 97530 THERAPEUTIC ACTIVITIES: CPT

## 2017-12-09 PROCEDURE — 97535 SELF CARE MNGMENT TRAINING: CPT

## 2017-12-09 PROCEDURE — 97110 THERAPEUTIC EXERCISES: CPT

## 2017-12-09 RX ADMIN — BRIMONIDINE TARTRATE OPHTHALMIC SOLUTION, 0.2% 1 DROP: 2 SOLUTION/ DROPS OPHTHALMIC at 17:38

## 2017-12-09 RX ADMIN — Medication 15 ML: at 12:03

## 2017-12-09 RX ADMIN — Medication 15 ML: at 21:41

## 2017-12-09 RX ADMIN — PANTOPRAZOLE SODIUM 40 MG: 40 TABLET, DELAYED RELEASE ORAL at 12:02

## 2017-12-09 RX ADMIN — ERGOCALCIFEROL 50000 UNITS: 1.25 CAPSULE ORAL at 11:58

## 2017-12-09 RX ADMIN — OXYCODONE HYDROCHLORIDE AND ACETAMINOPHEN 2 TABLET: 5; 325 TABLET ORAL at 00:01

## 2017-12-09 RX ADMIN — LIDOCAINE: 40 CREAM TOPICAL at 12:06

## 2017-12-09 RX ADMIN — BACLOFEN 10 MG: 10 TABLET ORAL at 21:32

## 2017-12-09 RX ADMIN — DULOXETINE 60 MG: 30 CAPSULE, DELAYED RELEASE ORAL at 12:00

## 2017-12-09 RX ADMIN — BRIMONIDINE TARTRATE OPHTHALMIC SOLUTION, 0.2% 1 DROP: 2 SOLUTION/ DROPS OPHTHALMIC at 11:59

## 2017-12-09 RX ADMIN — DOCUSATE SODIUM 100 MG: 100 CAPSULE, LIQUID FILLED ORAL at 12:00

## 2017-12-09 RX ADMIN — BRIMONIDINE TARTRATE OPHTHALMIC SOLUTION, 0.2% 1 DROP: 2 SOLUTION/ DROPS OPHTHALMIC at 21:32

## 2017-12-09 RX ADMIN — TIMOLOL MALEATE 1 DROP: 5 SOLUTION OPHTHALMIC at 12:03

## 2017-12-09 RX ADMIN — Medication 100 MG: at 01:00

## 2017-12-09 RX ADMIN — TIMOLOL MALEATE 1 DROP: 5 SOLUTION OPHTHALMIC at 21:32

## 2017-12-09 RX ADMIN — SENNOSIDES AND DOCUSATE SODIUM 1 TABLET: 8.6; 5 TABLET ORAL at 12:02

## 2017-12-09 RX ADMIN — GABAPENTIN 1200 MG: 400 CAPSULE ORAL at 12:05

## 2017-12-09 RX ADMIN — OXYCODONE HYDROCHLORIDE AND ACETAMINOPHEN 1 TABLET: 10; 325 TABLET ORAL at 17:37

## 2017-12-09 RX ADMIN — DOCUSATE SODIUM 100 MG: 100 CAPSULE, LIQUID FILLED ORAL at 21:33

## 2017-12-09 RX ADMIN — ATORVASTATIN CALCIUM 10 MG: 10 TABLET, FILM COATED ORAL at 21:32

## 2017-12-09 RX ADMIN — OXYCODONE HYDROCHLORIDE AND ACETAMINOPHEN 1 TABLET: 10; 325 TABLET ORAL at 07:27

## 2017-12-09 RX ADMIN — BACLOFEN 10 MG: 10 TABLET ORAL at 11:59

## 2017-12-09 RX ADMIN — GABAPENTIN 1200 MG: 400 CAPSULE ORAL at 21:32

## 2017-12-09 RX ADMIN — OXYCODONE HYDROCHLORIDE AND ACETAMINOPHEN 1 TABLET: 10; 325 TABLET ORAL at 21:37

## 2017-12-09 RX ADMIN — Medication 100 MG: at 12:00

## 2017-12-09 RX ADMIN — OXYCODONE HYDROCHLORIDE AND ACETAMINOPHEN 1 TABLET: 10; 325 TABLET ORAL at 12:03

## 2017-12-09 RX ADMIN — MIRTAZAPINE 15 MG: 15 TABLET, FILM COATED ORAL at 21:32

## 2017-12-09 RX ADMIN — LIDOCAINE: 40 CREAM TOPICAL at 21:41

## 2017-12-09 ASSESSMENT — PAIN SCALES - GENERAL
PAINLEVEL_OUTOF10: 8
PAINLEVEL_OUTOF10: 8
PAINLEVEL_OUTOF10: 9
PAINLEVEL_OUTOF10: 8
PAINLEVEL_OUTOF10: 8
PAINLEVEL_OUTOF10: 3
PAINLEVEL_OUTOF10: 6

## 2017-12-09 ASSESSMENT — PAIN DESCRIPTION - ORIENTATION
ORIENTATION: RIGHT
ORIENTATION: RIGHT

## 2017-12-09 ASSESSMENT — PAIN DESCRIPTION - PAIN TYPE
TYPE: ACUTE PAIN
TYPE: ACUTE PAIN

## 2017-12-09 ASSESSMENT — PAIN DESCRIPTION - LOCATION
LOCATION: ARM
LOCATION: ARM

## 2017-12-09 ASSESSMENT — PAIN DESCRIPTION - FREQUENCY: FREQUENCY: INTERMITTENT

## 2017-12-09 ASSESSMENT — PAIN DESCRIPTION - DESCRIPTORS
DESCRIPTORS: THROBBING;CONSTANT
DESCRIPTORS: ACHING

## 2017-12-09 NOTE — PROGRESS NOTES
reviewed, analyzed and discussed with patient and staff:   No results found for this or any previous visit (from the past 24 hour(s)). Fluoro For Surgical Procedures  Result Date: 12/5/2017  X-RAY DOSE SUMMARY: 1 FLUOROSCOPIC IMAGES SAVED TO PACS. 0SPOT FILM WAS EXPOSED. 2SECOND FLUOROSCOPY TIME. 1.4MGY CUMULATIVE X-RAY DOSE. CONCLUSION: SINGLE INTRAOPERATIVE IMAGE SAVED       Previous extensive, complex labs, notes and diagnostics reviewed and analyzed. ALLERGIES:    Allergies as of 12/06/2017 - Review Complete 12/06/2017   Allergen Reaction Noted    Pcn [penicillins] Itching 02/24/2017      (please also verify by checking MAR)      Yesterday I evaluated this patient for periodic reassessment of medical and functional status. The patient was discussed in detail at the treatment team meeting focusing on current medical issues, progress in therapies, social issues, psychological issues, barriers to progress and strategies to address these barriers, and discharge planning. See the hand written addendum to rehab progress note. The patient continues to be high risk for future disability and their medical and rehabilitation prognosis continue to be good and therefore, we will continue the patient's rehabilitation course as planned. The patient's tentative discharge date was set. Patient and family education was discussed. The patient was made aware of the team discussion regarding their progress. Discharge plans were discussed along with barriers to progress and strategies to address these barriers, patient encouraged to continue to discuss discharge plans with . We will continue to work on cognitive deficits add music therapy and strength in his left shoulder without breaking his current shoulder precautions which were reviewed with the patient and staff. Complex Physical Medicine & Rehab Issues Assess & Plan:   1.  Severe abnormality of gait and mobility and impaired self-care and ADL's secondary to progressive right triceps tendon rupture status post repair. Functional and medical status reassessed regarding patients ability to participate in therapies and patient found to be able to participate in acute intensive comprehensive inpatient rehabilitation program including PT/OT to improve balance, ambulation, ADLs, and to improve the P/AROM. Therapeutic modifications regarding activities in therapies, place, amount of time per day and intensity of therapy made daily. In bed therapies or bedside therapies prn.   2. Bowel and Bladder dysfunction: [place] catheter, frequent toileting, ambulate to bathroom with assistance, check post void residuals. Check for C.difficile x1 if >2 loose stools in 24 hours, continue bowel & bladder program.  Monitor bowel and bladder function. Lactinex 2 PO every AC. MOM prn, Brown Bomb prn, Glycerin suppository prn, enema prn.  3. Severe right upper extremity pain and generalized OA pain, severe low back pain: reassess pain every shift and prior to and after each therapy session, give prn Tylenol and   when necessary Percocet, modalities prn in therapy, Lidoderm, K-pad prn. Patient requests Lidocaine Gel / Lidocaine Patch daily, needs to schedule gel daily. Side to side turns. Add scheduled OxyContin  4. Skin healing right heel decubitus and breakdown risk: egg crate mattress, right heel Prevalon, bed extender, continue pressure relief program.  Daily skin exams and reports from nursing. 5. Fatigue due to nutritional and hydration deficiency:  continue to monitor I&Os, calorie counts prn, dietary consult prn. Add vitamin D and CoQ10  6. Acute episodic insomnia with situational adjustment disorder:  prn Ambien, monitor for day time sedation. 7. Falls risk elevated:  patient to use call light to get nursing assistance to get up, bed and chair alarm.   8. Elevated DVT risk: progressive activities in PT, continue prophylaxis ARELI hose, elevation and pneumatic

## 2017-12-09 NOTE — FLOWSHEET NOTE
Shift assessment complete. Pt is alert and orientated x 4. Pink, warm and dry. Abc's intact. Pt does state that he pain in his right arm and rates it an 8 out of 10. No sob noted. Pt has call light within reach. No other current needs at this time. Will continue to monitor along with hourly rounding.  Electronically signed by Pierre Avila RN on 12/8/17 at 8:30 PM

## 2017-12-10 LAB
BACTERIA: ABNORMAL /HPF
BILIRUBIN URINE: NEGATIVE
BLOOD, URINE: ABNORMAL
CLARITY: ABNORMAL
COLOR: YELLOW
GLUCOSE URINE: NEGATIVE MG/DL
KETONES, URINE: NEGATIVE MG/DL
LEUKOCYTE ESTERASE, URINE: ABNORMAL
NITRITE, URINE: NEGATIVE
PH UA: 7.5 (ref 5–9)
PROTEIN UA: 100 MG/DL
RBC UA: ABNORMAL /HPF (ref 0–2)
RENAL EPITHELIAL, UA: ABNORMAL /HPF
SPECIFIC GRAVITY UA: 1.01 (ref 1–1.03)
URINE REFLEX TO CULTURE: YES
UROBILINOGEN, URINE: 0.2 E.U./DL
WBC UA: >100 /HPF (ref 0–5)

## 2017-12-10 PROCEDURE — 6370000000 HC RX 637 (ALT 250 FOR IP): Performed by: PHYSICAL MEDICINE & REHABILITATION

## 2017-12-10 PROCEDURE — 87077 CULTURE AEROBIC IDENTIFY: CPT

## 2017-12-10 PROCEDURE — 1180000000 HC REHAB R&B

## 2017-12-10 PROCEDURE — 81001 URINALYSIS AUTO W/SCOPE: CPT

## 2017-12-10 PROCEDURE — 87186 SC STD MICRODIL/AGAR DIL: CPT

## 2017-12-10 PROCEDURE — 6370000000 HC RX 637 (ALT 250 FOR IP): Performed by: NURSE PRACTITIONER

## 2017-12-10 PROCEDURE — 87086 URINE CULTURE/COLONY COUNT: CPT

## 2017-12-10 PROCEDURE — 99232 SBSQ HOSP IP/OBS MODERATE 35: CPT | Performed by: PHYSICAL MEDICINE & REHABILITATION

## 2017-12-10 RX ORDER — CHLORHEXIDINE GLUCONATE 4 G/100ML
SOLUTION TOPICAL DAILY
Status: DISCONTINUED | OUTPATIENT
Start: 2017-12-10 | End: 2017-12-20 | Stop reason: HOSPADM

## 2017-12-10 RX ADMIN — LIDOCAINE: 40 CREAM TOPICAL at 09:05

## 2017-12-10 RX ADMIN — OXYCODONE HYDROCHLORIDE AND ACETAMINOPHEN 1 TABLET: 10; 325 TABLET ORAL at 09:06

## 2017-12-10 RX ADMIN — SENNOSIDES AND DOCUSATE SODIUM 1 TABLET: 8.6; 5 TABLET ORAL at 09:07

## 2017-12-10 RX ADMIN — GABAPENTIN 1200 MG: 400 CAPSULE ORAL at 09:06

## 2017-12-10 RX ADMIN — DOCUSATE SODIUM 100 MG: 100 CAPSULE, LIQUID FILLED ORAL at 09:06

## 2017-12-10 RX ADMIN — BACLOFEN 10 MG: 10 TABLET ORAL at 21:30

## 2017-12-10 RX ADMIN — Medication 15 ML: at 09:05

## 2017-12-10 RX ADMIN — BACLOFEN 10 MG: 10 TABLET ORAL at 09:05

## 2017-12-10 RX ADMIN — MIRTAZAPINE 15 MG: 15 TABLET, FILM COATED ORAL at 21:30

## 2017-12-10 RX ADMIN — TIMOLOL MALEATE 1 DROP: 5 SOLUTION OPHTHALMIC at 09:05

## 2017-12-10 RX ADMIN — CHLORHEXIDINE GLUCONATE 15 ML: 213 SOLUTION TOPICAL at 12:50

## 2017-12-10 RX ADMIN — OXYCODONE HYDROCHLORIDE AND ACETAMINOPHEN 1 TABLET: 10; 325 TABLET ORAL at 12:49

## 2017-12-10 RX ADMIN — OXYCODONE HYDROCHLORIDE AND ACETAMINOPHEN 2 TABLET: 5; 325 TABLET ORAL at 21:34

## 2017-12-10 RX ADMIN — OXYCODONE HYDROCHLORIDE AND ACETAMINOPHEN 1 TABLET: 5; 325 TABLET ORAL at 00:49

## 2017-12-10 RX ADMIN — Medication 100 MG: at 21:30

## 2017-12-10 RX ADMIN — ERGOCALCIFEROL 50000 UNITS: 1.25 CAPSULE ORAL at 09:06

## 2017-12-10 RX ADMIN — DOCUSATE SODIUM 100 MG: 100 CAPSULE, LIQUID FILLED ORAL at 21:30

## 2017-12-10 RX ADMIN — ATORVASTATIN CALCIUM 10 MG: 10 TABLET, FILM COATED ORAL at 21:30

## 2017-12-10 RX ADMIN — BRIMONIDINE TARTRATE OPHTHALMIC SOLUTION, 0.2% 1 DROP: 2 SOLUTION/ DROPS OPHTHALMIC at 21:29

## 2017-12-10 RX ADMIN — BRIMONIDINE TARTRATE OPHTHALMIC SOLUTION, 0.2% 1 DROP: 2 SOLUTION/ DROPS OPHTHALMIC at 12:49

## 2017-12-10 RX ADMIN — Medication 100 MG: at 09:06

## 2017-12-10 RX ADMIN — LIDOCAINE: 40 CREAM TOPICAL at 21:29

## 2017-12-10 RX ADMIN — OXYCODONE HYDROCHLORIDE AND ACETAMINOPHEN 1 TABLET: 10; 325 TABLET ORAL at 16:53

## 2017-12-10 RX ADMIN — TIMOLOL MALEATE 1 DROP: 5 SOLUTION OPHTHALMIC at 21:29

## 2017-12-10 RX ADMIN — PANTOPRAZOLE SODIUM 40 MG: 40 TABLET, DELAYED RELEASE ORAL at 09:07

## 2017-12-10 RX ADMIN — BRIMONIDINE TARTRATE OPHTHALMIC SOLUTION, 0.2% 1 DROP: 2 SOLUTION/ DROPS OPHTHALMIC at 09:05

## 2017-12-10 RX ADMIN — GABAPENTIN 1200 MG: 400 CAPSULE ORAL at 21:29

## 2017-12-10 RX ADMIN — DULOXETINE 60 MG: 30 CAPSULE, DELAYED RELEASE ORAL at 09:07

## 2017-12-10 ASSESSMENT — PAIN DESCRIPTION - LOCATION
LOCATION: BACK;NECK
LOCATION: BACK;NECK

## 2017-12-10 ASSESSMENT — PAIN SCALES - GENERAL
PAINLEVEL_OUTOF10: 7
PAINLEVEL_OUTOF10: 4
PAINLEVEL_OUTOF10: 7
PAINLEVEL_OUTOF10: 4
PAINLEVEL_OUTOF10: 8
PAINLEVEL_OUTOF10: 8

## 2017-12-10 ASSESSMENT — PAIN DESCRIPTION - PAIN TYPE
TYPE: ACUTE PAIN
TYPE: ACUTE PAIN

## 2017-12-10 NOTE — PROGRESS NOTES
Subjective: The patient complains of moderate to severe  acute right arm pain and acute on chronic mid and low back pain partially relieved by  Percocet and exacerbated by  ROM and palpation. We discussed scheduling OxyContin as his pain is interfering with this therapy. I am concerned about patients  New right heal breakdown. He complains of severe low back pain improving with Lidocaine Gel/Lidocaine Patch daily, needs to schedule gel daily. He also complains of painful blister in mouth-relieved with viscous lidocaine and Peridex rinse. I am concerned that he may be having urinary retention his urine is very strong smelling today's Lara Atkins ammonia notes to check a stat UA and make sure he is not retaining urine he's been using a condom catheter and I'm concerned that he may need an indwelling Nieto catheter. ROS x10: The patient also complains of severely impaired mobility and activities of daily living. Otherwise no new problems with vision, hearing, nose, mouth, throat, dermal, cardiovascular, GI, , pulmonary, musculoskeletal, psychiatric or neurological. See Rehab H&P on Rehab chart dated 12/07/17. Vital signs:  /67   Pulse 60   Temp 97 °F (36.1 °C) (Oral)   Resp 18   Ht 6' 1\" (1.854 m)   Wt 205 lb (93 kg)   SpO2 95%   BMI 27.05 kg/m²   I/O:   PO/Intake:  fair PO intake, no problems observed or reported. Bowel/Bladder:  Continent of stool. [place] catheter. General:  Patient is well developed, adequately nourished, non-obese and     well kempt. HEENT:    Legally blind PERRLA, severely hard of hearing hearing intact to loud voice, external inspection of ear     and nose benign. Inspection of lips, tongue and gums benign. Blister in mouth. Musculoskeletal: No significant change in strength or tone. All joints stable. Inspection and palpation of digits and nails show no clubbing,       cyanosis or inflammatory conditions.    Neuro/Psychiatric: Affect: flat and staff. Complex Physical Medicine & Rehab Issues Assess & Plan:   1. Severe abnormality of gait and mobility and impaired self-care and ADL's secondary to progressive right triceps tendon rupture status post repair. Functional and medical status reassessed regarding patients ability to participate in therapies and patient found to be able to participate in acute intensive comprehensive inpatient rehabilitation program including PT/OT to improve balance, ambulation, ADLs, and to improve the P/AROM. Therapeutic modifications regarding activities in therapies, place, amount of time per day and intensity of therapy made daily. In bed therapies or bedside therapies prn.   2. Bowel and Bladder dysfunction: Rule out UTI check stat UA Rule out urinary retention check stat bladder scan [place] catheter, frequent toileting, ambulate to bathroom with assistance, check post void residuals. Check for C.difficile x1 if >2 loose stools in 24 hours, continue bowel & bladder program.  Monitor bowel and bladder function. Lactinex 2 PO every AC. MOM prn, Brown Bomb prn, Glycerin suppository prn, enema prn.  3. Severe right upper extremity pain and generalized OA pain, severe low back pain: reassess pain every shift and prior to and after each therapy session, give prn Tylenol and   when necessary Percocet, modalities prn in therapy, Lidoderm, K-pad prn. Patient requests Lidocaine Gel / Lidocaine Patch daily, needs to schedule gel daily. Side to side turns. Add scheduled OxyContin  4. Skin healing right heel decubitus and breakdown risk: egg crate mattress, right heel Prevalon, bed extender, continue pressure relief program.  Daily skin exams and reports from nursing. 5. Fatigue due to nutritional and hydration deficiency:  continue to monitor I&Os, calorie counts prn, dietary consult prn. Add vitamin D and CoQ10  6.  Acute episodic insomnia with situational adjustment disorder:  prn Ambien, monitor for day time

## 2017-12-11 LAB
ALBUMIN SERPL-MCNC: 3.9 G/DL (ref 3.9–4.9)
ALP BLD-CCNC: 110 U/L (ref 35–104)
ALT SERPL-CCNC: 31 U/L (ref 0–41)
ANION GAP SERPL CALCULATED.3IONS-SCNC: 12 MEQ/L (ref 7–13)
AST SERPL-CCNC: 35 U/L (ref 0–40)
BASOPHILS ABSOLUTE: 0.1 K/UL (ref 0–0.2)
BASOPHILS RELATIVE PERCENT: 1.1 %
BILIRUB SERPL-MCNC: 0.3 MG/DL (ref 0–1.2)
BUN BLDV-MCNC: 26 MG/DL (ref 6–20)
CALCIUM SERPL-MCNC: 9.4 MG/DL (ref 8.6–10.2)
CHLORIDE BLD-SCNC: 98 MEQ/L (ref 98–107)
CO2: 29 MEQ/L (ref 22–29)
CREAT SERPL-MCNC: 1.02 MG/DL (ref 0.7–1.2)
EOSINOPHILS ABSOLUTE: 0.3 K/UL (ref 0–0.7)
EOSINOPHILS RELATIVE PERCENT: 3.9 %
GFR AFRICAN AMERICAN: >60
GFR NON-AFRICAN AMERICAN: >60
GLOBULIN: 3.3 G/DL (ref 2.3–3.5)
GLUCOSE BLD-MCNC: 85 MG/DL (ref 74–109)
HCT VFR BLD CALC: 43.2 % (ref 42–52)
HEMOGLOBIN: 14.7 G/DL (ref 14–18)
LYMPHOCYTES ABSOLUTE: 1.7 K/UL (ref 1–4.8)
LYMPHOCYTES RELATIVE PERCENT: 23.4 %
MCH RBC QN AUTO: 31.7 PG (ref 27–31.3)
MCHC RBC AUTO-ENTMCNC: 34 % (ref 33–37)
MCV RBC AUTO: 93.2 FL (ref 80–100)
MONOCYTES ABSOLUTE: 0.7 K/UL (ref 0.2–0.8)
MONOCYTES RELATIVE PERCENT: 10.2 %
NEUTROPHILS ABSOLUTE: 4.4 K/UL (ref 1.4–6.5)
NEUTROPHILS RELATIVE PERCENT: 61.4 %
PDW BLD-RTO: 13.4 % (ref 11.5–14.5)
PLATELET # BLD: 295 K/UL (ref 130–400)
POTASSIUM SERPL-SCNC: 4.8 MEQ/L (ref 3.5–5.1)
RBC # BLD: 4.64 M/UL (ref 4.7–6.1)
SODIUM BLD-SCNC: 139 MEQ/L (ref 132–144)
TOTAL PROTEIN: 7.2 G/DL (ref 6.4–8.1)
WBC # BLD: 7.1 K/UL (ref 4.8–10.8)

## 2017-12-11 PROCEDURE — 97530 THERAPEUTIC ACTIVITIES: CPT

## 2017-12-11 PROCEDURE — 99233 SBSQ HOSP IP/OBS HIGH 50: CPT | Performed by: PHYSICAL MEDICINE & REHABILITATION

## 2017-12-11 PROCEDURE — 36415 COLL VENOUS BLD VENIPUNCTURE: CPT

## 2017-12-11 PROCEDURE — 85025 COMPLETE CBC W/AUTO DIFF WBC: CPT

## 2017-12-11 PROCEDURE — 97532 HC COGNITIVE THERAPY 15 MIN: CPT

## 2017-12-11 PROCEDURE — 97112 NEUROMUSCULAR REEDUCATION: CPT

## 2017-12-11 PROCEDURE — 6370000000 HC RX 637 (ALT 250 FOR IP): Performed by: NURSE PRACTITIONER

## 2017-12-11 PROCEDURE — 97110 THERAPEUTIC EXERCISES: CPT

## 2017-12-11 PROCEDURE — 97535 SELF CARE MNGMENT TRAINING: CPT

## 2017-12-11 PROCEDURE — 80053 COMPREHEN METABOLIC PANEL: CPT

## 2017-12-11 PROCEDURE — 1180000000 HC REHAB R&B

## 2017-12-11 PROCEDURE — 6370000000 HC RX 637 (ALT 250 FOR IP): Performed by: PHYSICAL MEDICINE & REHABILITATION

## 2017-12-11 PROCEDURE — 51702 INSERT TEMP BLADDER CATH: CPT

## 2017-12-11 RX ORDER — SULFAMETHOXAZOLE AND TRIMETHOPRIM 800; 160 MG/1; MG/1
1 TABLET ORAL EVERY 12 HOURS SCHEDULED
Status: DISCONTINUED | OUTPATIENT
Start: 2017-12-11 | End: 2017-12-12

## 2017-12-11 RX ADMIN — TIMOLOL MALEATE 1 DROP: 5 SOLUTION OPHTHALMIC at 09:56

## 2017-12-11 RX ADMIN — OXYCODONE HYDROCHLORIDE AND ACETAMINOPHEN 1 TABLET: 10; 325 TABLET ORAL at 17:35

## 2017-12-11 RX ADMIN — DOCUSATE SODIUM 100 MG: 100 CAPSULE, LIQUID FILLED ORAL at 09:56

## 2017-12-11 RX ADMIN — ATORVASTATIN CALCIUM 10 MG: 10 TABLET, FILM COATED ORAL at 21:51

## 2017-12-11 RX ADMIN — BACLOFEN 10 MG: 10 TABLET ORAL at 09:57

## 2017-12-11 RX ADMIN — OXYCODONE HYDROCHLORIDE AND ACETAMINOPHEN 1 TABLET: 10; 325 TABLET ORAL at 13:31

## 2017-12-11 RX ADMIN — DOCUSATE SODIUM 100 MG: 100 CAPSULE, LIQUID FILLED ORAL at 21:51

## 2017-12-11 RX ADMIN — SENNOSIDES AND DOCUSATE SODIUM 1 TABLET: 8.6; 5 TABLET ORAL at 09:57

## 2017-12-11 RX ADMIN — CHLORHEXIDINE GLUCONATE 15 ML: 213 SOLUTION TOPICAL at 09:58

## 2017-12-11 RX ADMIN — DULOXETINE 60 MG: 30 CAPSULE, DELAYED RELEASE ORAL at 09:56

## 2017-12-11 RX ADMIN — Medication 15 ML: at 21:53

## 2017-12-11 RX ADMIN — TIMOLOL MALEATE 1 DROP: 5 SOLUTION OPHTHALMIC at 21:51

## 2017-12-11 RX ADMIN — SULFAMETHOXAZOLE AND TRIMETHOPRIM 1 TABLET: 800; 160 TABLET ORAL at 09:57

## 2017-12-11 RX ADMIN — OXYCODONE HYDROCHLORIDE AND ACETAMINOPHEN 1 TABLET: 10; 325 TABLET ORAL at 09:59

## 2017-12-11 RX ADMIN — BRIMONIDINE TARTRATE OPHTHALMIC SOLUTION, 0.2% 1 DROP: 2 SOLUTION/ DROPS OPHTHALMIC at 21:51

## 2017-12-11 RX ADMIN — MIRTAZAPINE 15 MG: 15 TABLET, FILM COATED ORAL at 21:51

## 2017-12-11 RX ADMIN — GABAPENTIN 1200 MG: 400 CAPSULE ORAL at 21:51

## 2017-12-11 RX ADMIN — PANTOPRAZOLE SODIUM 40 MG: 40 TABLET, DELAYED RELEASE ORAL at 09:56

## 2017-12-11 RX ADMIN — BACLOFEN 10 MG: 10 TABLET ORAL at 21:51

## 2017-12-11 RX ADMIN — BRIMONIDINE TARTRATE OPHTHALMIC SOLUTION, 0.2% 1 DROP: 2 SOLUTION/ DROPS OPHTHALMIC at 09:55

## 2017-12-11 RX ADMIN — GABAPENTIN 1200 MG: 400 CAPSULE ORAL at 09:56

## 2017-12-11 RX ADMIN — MAGNESIUM HYDROXIDE 30 ML: 400 SUSPENSION ORAL at 17:42

## 2017-12-11 RX ADMIN — OXYCODONE HYDROCHLORIDE AND ACETAMINOPHEN 2 TABLET: 5; 325 TABLET ORAL at 21:57

## 2017-12-11 RX ADMIN — BISACODYL 10 MG: 10 SUPPOSITORY RECTAL at 17:09

## 2017-12-11 RX ADMIN — BRIMONIDINE TARTRATE OPHTHALMIC SOLUTION, 0.2% 1 DROP: 2 SOLUTION/ DROPS OPHTHALMIC at 13:29

## 2017-12-11 RX ADMIN — SULFAMETHOXAZOLE AND TRIMETHOPRIM 1 TABLET: 800; 160 TABLET ORAL at 21:51

## 2017-12-11 RX ADMIN — Medication 100 MG: at 09:56

## 2017-12-11 RX ADMIN — LIDOCAINE: 40 CREAM TOPICAL at 09:55

## 2017-12-11 ASSESSMENT — PAIN SCALES - GENERAL
PAINLEVEL_OUTOF10: 2
PAINLEVEL_OUTOF10: 7
PAINLEVEL_OUTOF10: 7
PAINLEVEL_OUTOF10: 8
PAINLEVEL_OUTOF10: 7
PAINLEVEL_OUTOF10: 5

## 2017-12-11 ASSESSMENT — PAIN DESCRIPTION - LOCATION: LOCATION: NECK

## 2017-12-11 ASSESSMENT — PAIN DESCRIPTION - DESCRIPTORS: DESCRIPTORS: SORE

## 2017-12-11 NOTE — PROGRESS NOTES
Co-treatment   Time In 1100         Time Out 1200         Minutes 60                 SILVANA Harmon    Electronically signed by SILVANA Harmon on 12/11/2017 at 4:43 PM

## 2017-12-11 NOTE — PROGRESS NOTES
Physical Therapy Rehab Treatment Note  Facility/Department: [facility]  Room: [facility]/L760-45       NAME: Tosin Fink  : 1960 (56 y.o.)  MRN: [id number]  CODE STATUS: Full Code    Date of Service: 2017       Restrictions:  Restrictions/Precautions: Weight Bearing, ROM Restrictions, Fall Risk  Upper Extremity Weight Bearing Restrictions  Other:  (NWB R UE; no R shoulder flexion past 90degrees; no R shoulder ROM past midline; No R shoulder ext past neutral)  Body mass index is 27.05 kg/m². SUBJECTIVE: Pt states the roho cushion feels comfortable. States it's like his air cushion at home. States he just had pain meds. Pre Treatment Pain Screening 0/10    OBJECTIVE:  Comments / Details: Educated pt on seated pressure releif. Issued Roho cusion. Pt demo'd ability to preform. Encouraged pt to preform wt shifting hourly for pressure releif to prevent skin breakdown. Bed mobility  Rolling to Left: Stand by assistance  Rolling to Right: Stand by assistance  Supine to Sit: Minimal assistance (for 1 LE without HOB elevated on mat.  )  Sit to Supine: Stand by assistance (on mat)  Comment: Increased effort with sup to sit without HOB elevated. Transfers  Mat to Chair:   Squat Pivot Transfers: Moderate Assistance (pivot without board. +2 for safety )  Comment: Cues for foot placement and set up prior to transfer. Pt tends to lean into transfer. Cues for proper wt shift and technique. Activity Tolerance  Activity Tolerance: Patient Tolerated treatment well    Exercises  Core Strengthening: hooklying TA isometrics x10, knee rolls x15    hook lying hip abd/add isometrics x10 ea. Neurodevelopmental Techniques: MRE trunk all planes. Seated unsupported beach ball tap out of DACIA to increase trunk control and balance. Seated wt shifts. ASSESSMENT/COMMENTS:  Assessment: Occasional cues not to use R UE to assist LEs. Able to initiate transfers without board +2.   Pt able to withstand

## 2017-12-11 NOTE — PROGRESS NOTES
Subjective: The patient complains of moderate to severe acute right arm pain and acute on chronic mid and low back pain partially relieved by  Percocet and exacerbated by  ROM and palpation. We discussed scheduling OxyContin as his pain is interfering with this therapy. I am concerned about patients  New right heal breakdown. He complains of severe low back pain improving with Lidocaine Gel/Lidocaine Patch daily, needs to schedule gel daily. He also complains of painful blister in mouth-relieved with viscous lidocaine and Peridex rinse. I am concerned about his severe UTI, I prescribed Bactrim and Nieto placement. ROS x10: The patient also complains of severely impaired mobility and activities of daily living. Otherwise no new problems with vision, hearing, nose, mouth, throat, dermal, cardiovascular, GI, , pulmonary, musculoskeletal, psychiatric or neurological. See Rehab H&P on Rehab chart dated 12/07/17. Vital signs:  /69   Pulse 57   Temp 97 °F (36.1 °C) (Oral)   Resp 18   Ht 6' 1\" (1.854 m)   Wt 205 lb (93 kg)   SpO2 96%   BMI 27.05 kg/m²   I/O:   PO/Intake:  fair PO intake, no problems observed or reported. Bowel/Bladder:  Continent of stool. Foleycatheter. Severe UTI (Bactrim). Neurogenic bowel and bladder  General:  Patient is well developed, adequately nourished, non-obese and     well kempt. HEENT:    Legally blind PERRLA, severely hard of hearing hearing intact to loud voice, external inspection of ear     and nose benign. Inspection of lips, tongue and gums benign. Blister in mouth. Musculoskeletal: No significant change in strength or tone. All joints stable. Inspection and palpation of digits and nails show no clubbing,       cyanosis or inflammatory conditions. Neuro/Psychiatric: Affect: flat but pleasant. Alert and oriented to person, place and     situation.   No significant change in deep tendon reflexes or     sensation  Lungs:  Diminished CTA-B. Respiration effort is normal at rest.     Heart:   S1 = S2, RRR. No loud murmurs. Abdomen:  Soft, non-tender, no enlargement of liver or spleen. Extremities:  No significant lower extremity edema or tenderness. Skin:   Intact right heal breakdown    Rehabilitation:  Physical therapy: FIMS:  Bed Mobility: Scooting: Maximal assistance    Transfers: Bed to Chair: Maximum assistance (Slide board)  Squat Pivot Transfers: Dependent/Total, Maximum Assistance, Moderate Assistance,  ,      FIMS: Bed, Chair, Wheel Chair: 0 - Did not occur  Walk: 0 - Activity Not Assessed/Does Not Occur  Distance Walked: 0  Wheel Chair: 1 - Total Assistance Walks/operates wheelchair < 50 feet OR requires two or more people OR patient performs < 25% of locomotion effort  Distance Traveled in Wheel Chair: <1ft  Stairs: 0 - Activity Does not Occur ( 0 only for the admission assessment), PT Equipment Recommendations  Other: Slideboard, Assessment: Patient motivated and demonstrates good carryover. Self corrects to maintain NWB RT ue. Good carryover and safety awareness. Challenged by abdominal exercises and seated LOS.     Occupational therapy: FIMS:  Eatin - Feeds self with setup/supervision/cues and/or requires only setup/supervision/cues to perform tube feedings  Groomin - Did not occur  Bathin - Did not occur  Dressing-Upper: 0 - Did not occur  Dressing-Lower: 0 - Did not occur  Toiletin - Total assist  Toilet Transfer: 0 - Did not occur  Tub Transfer: 0 - Activity does not occur  Shower Transfer: 0 - Activity does not occur,  ,      Speech therapy: FIMS: Comprehension: 6 - Complex ideas 90% or device (hearing aid/glasses)  Expression: 6 - Device used to express complex ideas/needs  Social Interaction: 6 - Patient requires medication for mood and/or effect  Problem Solvin - Independent with device (e.g. notes, schedules)  Memory: 6 - Patient requires device to recall (e.g.

## 2017-12-11 NOTE — PROGRESS NOTES
Reinforcement needed;  [] patient   [] family    Safety Devices:  [] Call light within reach  [] Chair alarm activated  [] Bed alarm activated  [] Other:    Comprehension          Expression   []7 - Independent   []7 - Indpendent   []6 - Modified Independent  []6 - Modified Independent   []5 - Supervision   [x]5 - Supervision   [x]4 - Min Assist   []4 - Min Assist   []3 - Mod Assist   []3 - Mod Assist   []2 - Max Assist   []2 - Max Assist   []1 - Dependent   []1 - Dependent    Problem Solving        Memory   []7 - Independent   []7 - Independent   []6 - Modified Independent  []6 - Modified Independent   []5 - Supervision   []5 - Supervision   []4 - Min Assist   [x]4 - Min Assist   [x]3 - Mod Assist   []3 - Mod Assist   []2 - Max Assist   []2 - Max Assist   []1 - Dependent   [] 1 -Dependent    Nicky GLOVER, CCC-SLP 12/11/17

## 2017-12-11 NOTE — PROGRESS NOTES
1000   Time Out 1030   Minutes 30     Minutes:30      Transfer/Bed mobility trainin      Gait training:      Neuro re education:     Therapeutic ex:      Sigifredo Chen PTA, 17 at 11:56 AM

## 2017-12-12 PROCEDURE — 6370000000 HC RX 637 (ALT 250 FOR IP): Performed by: PHYSICAL MEDICINE & REHABILITATION

## 2017-12-12 PROCEDURE — 97110 THERAPEUTIC EXERCISES: CPT

## 2017-12-12 PROCEDURE — 97530 THERAPEUTIC ACTIVITIES: CPT

## 2017-12-12 PROCEDURE — 97535 SELF CARE MNGMENT TRAINING: CPT

## 2017-12-12 PROCEDURE — 97112 NEUROMUSCULAR REEDUCATION: CPT

## 2017-12-12 PROCEDURE — 6370000000 HC RX 637 (ALT 250 FOR IP): Performed by: NURSE PRACTITIONER

## 2017-12-12 PROCEDURE — 1180000000 HC REHAB R&B

## 2017-12-12 PROCEDURE — 99232 SBSQ HOSP IP/OBS MODERATE 35: CPT | Performed by: PHYSICAL MEDICINE & REHABILITATION

## 2017-12-12 RX ORDER — CIPROFLOXACIN 500 MG/1
500 TABLET, FILM COATED ORAL EVERY 12 HOURS SCHEDULED
Status: DISCONTINUED | OUTPATIENT
Start: 2017-12-12 | End: 2017-12-13

## 2017-12-12 RX ADMIN — BRIMONIDINE TARTRATE OPHTHALMIC SOLUTION, 0.2% 1 DROP: 2 SOLUTION/ DROPS OPHTHALMIC at 09:45

## 2017-12-12 RX ADMIN — Medication 15 ML: at 20:34

## 2017-12-12 RX ADMIN — Medication 100 MG: at 09:43

## 2017-12-12 RX ADMIN — ATORVASTATIN CALCIUM 10 MG: 10 TABLET, FILM COATED ORAL at 20:34

## 2017-12-12 RX ADMIN — SULFAMETHOXAZOLE AND TRIMETHOPRIM 1 TABLET: 800; 160 TABLET ORAL at 09:41

## 2017-12-12 RX ADMIN — DULOXETINE 60 MG: 30 CAPSULE, DELAYED RELEASE ORAL at 09:41

## 2017-12-12 RX ADMIN — GABAPENTIN 1200 MG: 400 CAPSULE ORAL at 09:44

## 2017-12-12 RX ADMIN — OXYCODONE HYDROCHLORIDE AND ACETAMINOPHEN 1 TABLET: 10; 325 TABLET ORAL at 19:05

## 2017-12-12 RX ADMIN — OXYCODONE HYDROCHLORIDE AND ACETAMINOPHEN 1 TABLET: 5; 325 TABLET ORAL at 04:30

## 2017-12-12 RX ADMIN — CIPROFLOXACIN HYDROCHLORIDE 500 MG: 500 TABLET, FILM COATED ORAL at 20:33

## 2017-12-12 RX ADMIN — TIMOLOL MALEATE 1 DROP: 5 SOLUTION OPHTHALMIC at 20:34

## 2017-12-12 RX ADMIN — DORZOLAMIDE HYDROCHLORIDE 1 DROP: 20 SOLUTION/ DROPS OPHTHALMIC at 18:00

## 2017-12-12 RX ADMIN — BRIMONIDINE TARTRATE OPHTHALMIC SOLUTION, 0.2% 1 DROP: 2 SOLUTION/ DROPS OPHTHALMIC at 19:08

## 2017-12-12 RX ADMIN — OXYCODONE HYDROCHLORIDE AND ACETAMINOPHEN 1 TABLET: 10; 325 TABLET ORAL at 09:43

## 2017-12-12 RX ADMIN — BACLOFEN 10 MG: 10 TABLET ORAL at 20:34

## 2017-12-12 RX ADMIN — Medication 100 MG: at 14:24

## 2017-12-12 RX ADMIN — OXYCODONE HYDROCHLORIDE AND ACETAMINOPHEN 1 TABLET: 10; 325 TABLET ORAL at 14:24

## 2017-12-12 RX ADMIN — GABAPENTIN 1200 MG: 400 CAPSULE ORAL at 20:33

## 2017-12-12 RX ADMIN — SENNOSIDES AND DOCUSATE SODIUM 1 TABLET: 8.6; 5 TABLET ORAL at 09:42

## 2017-12-12 RX ADMIN — DOCUSATE SODIUM 100 MG: 100 CAPSULE, LIQUID FILLED ORAL at 09:43

## 2017-12-12 RX ADMIN — MIRTAZAPINE 15 MG: 15 TABLET, FILM COATED ORAL at 20:34

## 2017-12-12 RX ADMIN — Medication 15 ML: at 09:49

## 2017-12-12 RX ADMIN — DOCUSATE SODIUM 100 MG: 100 CAPSULE, LIQUID FILLED ORAL at 20:34

## 2017-12-12 RX ADMIN — TIMOLOL MALEATE 1 DROP: 5 SOLUTION OPHTHALMIC at 09:50

## 2017-12-12 RX ADMIN — CHLORHEXIDINE GLUCONATE 15 ML: 213 SOLUTION TOPICAL at 09:40

## 2017-12-12 RX ADMIN — PANTOPRAZOLE SODIUM 40 MG: 40 TABLET, DELAYED RELEASE ORAL at 09:42

## 2017-12-12 RX ADMIN — BACLOFEN 10 MG: 10 TABLET ORAL at 09:41

## 2017-12-12 ASSESSMENT — PAIN DESCRIPTION - LOCATION
LOCATION: ELBOW
LOCATION: BUTTOCKS;ARM

## 2017-12-12 ASSESSMENT — PAIN DESCRIPTION - DESCRIPTORS
DESCRIPTORS: ACHING;SORE
DESCRIPTORS: ACHING

## 2017-12-12 ASSESSMENT — PAIN SCALES - GENERAL
PAINLEVEL_OUTOF10: 4
PAINLEVEL_OUTOF10: 3
PAINLEVEL_OUTOF10: 9
PAINLEVEL_OUTOF10: 6
PAINLEVEL_OUTOF10: 10
PAINLEVEL_OUTOF10: 8
PAINLEVEL_OUTOF10: 7

## 2017-12-12 ASSESSMENT — PAIN DESCRIPTION - ORIENTATION
ORIENTATION: RIGHT
ORIENTATION: RIGHT

## 2017-12-12 NOTE — PLAN OF CARE
Problem: Pain:  Goal: Patient's pain/discomfort is manageable  Patient's pain/discomfort is manageable   Outcome: Ongoing  Continue plan of care.
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
## 2017-12-12 NOTE — PROGRESS NOTES
Physical Therapy Rehab Treatment Note  Facility/Department: [facility]  Room: I187/O195-77       NAME: Tino Charles  : 1960 (56 y.o.)  MRN: [id number]  CODE STATUS: Full Code    Date of Service: 2017     Restrictions:  Restrictions/Precautions: Weight Bearing, ROM Restrictions, Fall Risk, General Precautions  Upper Extremity Weight Bearing Restrictions  Other:  (NWB R UE; no R shoulder flexion past 90degrees; no R shoulder ROM past midline; No R shoulder ext past neutral)  Body mass index is 27.05 kg/m². SUBJECTIVE: Subjective: Pt reports \"just a little pain\". States he has leg braces at home that his aide may be able to bring in. Pain Screening  Patient Currently in Pain: Yes     Post Treatment Pain Screenin/10  Declined intervention. Pain Assessment  Pain Assessment: 0-10  Pain Level: 4  Pain Location: Elbow  Pain Orientation: Right  Pain Descriptors: Aching    OBJECTIVE:      Bed mobility  Rolling to Left: Stand by assistance  Rolling to Right: Stand by assistance  Supine to Sit: Stand by assistance  Sit to Supine: Minimal assistance on flat mat    Transfers  Squat Pivot Transfers: Moderate Assistance (with and without slide board to L.  +2 for safety without board. Cues and assist for LE placement with set up. VCs not to utilize R UE. )    WC  Pt requires assist to maintain straight path d/t only able to use L UE to propel. Pt able to change direction with 1 UE. Pt able to lock/unlock breaks with cues. Increased difficulty locking R break with LUE. Would benefit from brake extention. Pt able to remove L arm rest with cues. Dep with leg rests. Exercises  Core Strengthening: hooklying TA isometrics x10, knee rolls x15    Neurodevelopmental Techniques: MRE trunk all planes. ASSESSMENT/COMMENTS:  Assessment: Pt cont to attempt to utilize R UE in transfers and to assist LEs requiring cues. Variable safety and assistance with transfers needed.       PLAN OF CARE/Safety:

## 2017-12-12 NOTE — PROGRESS NOTES
Physical Therapy Rehab Treatment Note  Facility/Department: [facility]  Room: Y588/Z167-52       NAME: Hung Jackson  : 1960 (56 y.o.)  MRN: [id number]  CODE STATUS: Full Code    Date of Service: 2017       Restrictions:  Restrictions/Precautions: Weight Bearing, ROM Restrictions, Fall Risk, General Precautions  Upper Extremity Weight Bearing Restrictions  Other:  (NWB R UE; no R shoulder flexion past 90degrees; no R shoulder ROM past midline; No R shoulder ext past neutral)  Body mass index is 27.05 kg/m². Upon arrival to room pt sitting in [facility] with only 1 arm rest on, propelling self with B UEs, and reaching into ext past neutral with R UE to get something out of drawer. SUBJECTIVE: Subjective: Pt states he is upset because his new pants are missing. Pain Screening  Patient Currently in Pain: Yes  Pre Treatment Pain Screening  Pain at present: 8    Post Treatment Pain Screening:  Pain Assessment: 0-10  Pain Level: 9  Pain Location: Buttocks; Arm  Pain Orientation: Right  Pain Descriptors: Aching; Sore  Pain Intervention(s): RN notified    OBJECTIVE:     Bed mobility  Rolling to Left: Stand by assistance  Rolling to Right: Stand by assistance  Supine to Sit: Stand by assistance-moderate assist on mat without rail or HOB elevated. Sit to Supine: Minimal assistance    Transfers  Squat Pivot Transfers: Moderate Assistance (with slide board.  )     Wheelchair Activities  Wheelchair Type: Standard  Wheelchair Cushion: Pressure Relieving  Level of Assistance for pressure relief activities: Stand by assistance (with VCs)  Propulsion 1  Propulsion: Manual  Level: Carpet  Method: LUE only  Level of Assistance: Moderate assistance  Description/ Details: Pt propels with 1 UE and requires assist from therapist to maintain st path.     Distance: 125ft       Activity Tolerance  Activity Tolerance: Patient Tolerated treatment well    Exercises  Core Strengthening: hooklying TA isometrics x10, knee rolls x15

## 2017-12-12 NOTE — PROGRESS NOTES
[facility]   Acute  Rehabilitation  MUSIC THERAPY      Date:  12/12/2017        Patient Name: Asael Ling       MRN: [id number]        YOB: 1960 (56 y.o.)       Gender: male          RESTRICTIONS/PRECAUTIONS:  Restrictions/Precautions: Weight Bearing, ROM Restrictions, Fall Risk, General Precautions  Vision: Impaired  Hearing: Exceptions to [facility]  Hearing Exceptions: Hard of hearing/hearing concerns, No hearing aid    Time of Session: 3:40pm - 4:10pm     PAIN RATING BEFORE MUSIC THERAPY SESSION:   [x] No  [] Yes        Location: n/a    Pain Rating: n/a    Comment(s): n/a    ANXIETY RATING BEFORE MUSIC THERAPY SESSION:  [x] No  [] Yes         Anxiety Rating: n/a    Comment(s): n/a    SUBJECTIVE: \"Do you play? Can I hear you? \"    OBJECTIVE:        [x] To Improve Mood     [x] To Increase Social Well-Being  [] To Increase Focus   [x] To Increase Emotional Well-Being  [] To Increase Eye Contact    [] To Increase Spiritual Well-Being   [] To Decrease Anxiety   [x] To Increase Relaxation   [] To Decrease Pain    [] To Increase Communication  [] To Increase Movement to Music       MUSIC INTERVENTION PROVIDED:     [x] Live Music on Voice  [] Recorded Music   [x] Live Music on Guitar  [x] Discussion Related to Music   [] Live Music on Q-chord  [x] Discussion Related to Pt Experience   [] Live Music on Percussion      PARTICIPATION LEVEL OF PATIENT:     [x] Active with discussion   [] Passive with discussion   [x] Active with singing    [] Passive with singing   [] Active with instrument playing  [] Passive with instrument playing   [x] Actively listening to music   [] Passively listening to music.          OUTCOMES OBSERVED:      [x] Improved Mood   [x] Increased Social Well-Being  [] Increased Focus   [x] Increased Emotional Well-Being  [] Increased Eye Contact    [] Increased Spiritual Well-Being   [] Decreased Anxiety   [x] Increased Relaxation   [] Decreased Pain    [] Increased Communication   []

## 2017-12-12 NOTE — PROGRESS NOTES
Occupational Therapy  Facility/Department: [facility]  Daily Treatment Note  NAME: Simi Alonso  : 1960  MRN: [id number]    Date of Service: 2017    Patient Diagnosis(es): There were no encounter diagnoses. has a past medical history of Abnormality of gait and mobility w/paraplegia due to Rt triceps tendon rupture s/p repair, [facility] admit 17; Asthma; Hard of hearing; Hypertension; Legally blind; MRSA infection within last 3 months; Paraplegia ([address] and Triceps tendon rupture, right, initial encounter. has a past surgical history that includes Biceps tendon repair (Right, 2017). Restrictions  Restrictions/Precautions  Restrictions/Precautions: Weight Bearing, ROM Restrictions, Fall Risk, General Precautions  Right Upper Extremity Weight Bearing: Non Weight Bearing  Other: No ROM R/shoulder/elbow, OK for ROM of digits  Subjective    General  Pre Treatment Pain Screening  Pain at present: 0  Pain Assessment  Patient Currently in Pain: No    Objective     Pt. completed 2# L/UE ADL strengthening exercises to increase his transfer strength ,ind. and safety all ranges x 2 sets x 25 reps with rests required. Pt. completed theraband exercises with focus on tricep strengthening. Jumbo sized playing cards utilized by pt. for sorting according to suits  Pt. demonstrated mod. difficulty sorting the cards requiring extra time to complete. Pt. was able to complete a card trick previously known to him with correct result. Assessment      Activity Tolerance  Activity Tolerance: Patient Tolerated treatment well  Safety Devices  Safety Devices in place: Yes  Type of devices:  All fall risk precautions in place        Plan   Plan  Times per week: 5-7 times per week for 15-90 minutes a day   Plan weeks: 2.5 weeks   Current Treatment Recommendations: Strengthening, ROM, Functional Mobility Training, Endurance Training, Wheelchair Mobility Training, Safety Education & Training, Self-Care / ADL,

## 2017-12-13 LAB
ORGANISM: ABNORMAL
ORGANISM: ABNORMAL
URINE CULTURE, ROUTINE: ABNORMAL

## 2017-12-13 PROCEDURE — 6370000000 HC RX 637 (ALT 250 FOR IP): Performed by: NURSE PRACTITIONER

## 2017-12-13 PROCEDURE — 97542 WHEELCHAIR MNGMENT TRAINING: CPT

## 2017-12-13 PROCEDURE — 97110 THERAPEUTIC EXERCISES: CPT

## 2017-12-13 PROCEDURE — 6370000000 HC RX 637 (ALT 250 FOR IP): Performed by: PHYSICAL MEDICINE & REHABILITATION

## 2017-12-13 PROCEDURE — 97112 NEUROMUSCULAR REEDUCATION: CPT

## 2017-12-13 PROCEDURE — 6370000000 HC RX 637 (ALT 250 FOR IP): Performed by: INTERNAL MEDICINE

## 2017-12-13 PROCEDURE — 97532 HC COGNITIVE THERAPY 15 MIN: CPT

## 2017-12-13 PROCEDURE — 99232 SBSQ HOSP IP/OBS MODERATE 35: CPT | Performed by: PHYSICAL MEDICINE & REHABILITATION

## 2017-12-13 PROCEDURE — 1180000000 HC REHAB R&B

## 2017-12-13 PROCEDURE — 97535 SELF CARE MNGMENT TRAINING: CPT

## 2017-12-13 PROCEDURE — 97530 THERAPEUTIC ACTIVITIES: CPT

## 2017-12-13 RX ORDER — CIPROFLOXACIN 500 MG/1
500 TABLET, FILM COATED ORAL EVERY 12 HOURS SCHEDULED
Status: DISCONTINUED | OUTPATIENT
Start: 2017-12-13 | End: 2017-12-14

## 2017-12-13 RX ADMIN — BRIMONIDINE TARTRATE OPHTHALMIC SOLUTION, 0.2% 1 DROP: 2 SOLUTION/ DROPS OPHTHALMIC at 19:25

## 2017-12-13 RX ADMIN — OXYCODONE HYDROCHLORIDE AND ACETAMINOPHEN 1 TABLET: 10; 325 TABLET ORAL at 14:56

## 2017-12-13 RX ADMIN — DORZOLAMIDE HYDROCHLORIDE 1 DROP: 20 SOLUTION/ DROPS OPHTHALMIC at 19:29

## 2017-12-13 RX ADMIN — GABAPENTIN 1200 MG: 400 CAPSULE ORAL at 09:53

## 2017-12-13 RX ADMIN — Medication 15 ML: at 19:27

## 2017-12-13 RX ADMIN — MIRTAZAPINE 15 MG: 15 TABLET, FILM COATED ORAL at 19:32

## 2017-12-13 RX ADMIN — Medication 100 MG: at 12:58

## 2017-12-13 RX ADMIN — BACLOFEN 10 MG: 10 TABLET ORAL at 09:53

## 2017-12-13 RX ADMIN — OXYCODONE HYDROCHLORIDE AND ACETAMINOPHEN 1 TABLET: 10; 325 TABLET ORAL at 18:05

## 2017-12-13 RX ADMIN — CHLORHEXIDINE GLUCONATE 15 ML: 213 SOLUTION TOPICAL at 09:58

## 2017-12-13 RX ADMIN — CIPROFLOXACIN HYDROCHLORIDE 500 MG: 500 TABLET, FILM COATED ORAL at 19:27

## 2017-12-13 RX ADMIN — CHLORHEXIDINE GLUCONATE 15 ML: 213 SOLUTION TOPICAL at 09:52

## 2017-12-13 RX ADMIN — GABAPENTIN 1200 MG: 400 CAPSULE ORAL at 19:30

## 2017-12-13 RX ADMIN — TIMOLOL MALEATE 1 DROP: 5 SOLUTION OPHTHALMIC at 10:01

## 2017-12-13 RX ADMIN — DOCUSATE SODIUM 100 MG: 100 CAPSULE, LIQUID FILLED ORAL at 19:28

## 2017-12-13 RX ADMIN — DULOXETINE 60 MG: 30 CAPSULE, DELAYED RELEASE ORAL at 09:52

## 2017-12-13 RX ADMIN — BRIMONIDINE TARTRATE OPHTHALMIC SOLUTION, 0.2% 1 DROP: 2 SOLUTION/ DROPS OPHTHALMIC at 14:56

## 2017-12-13 RX ADMIN — TIMOLOL MALEATE 1 DROP: 5 SOLUTION OPHTHALMIC at 19:34

## 2017-12-13 RX ADMIN — PANTOPRAZOLE SODIUM 40 MG: 40 TABLET, DELAYED RELEASE ORAL at 09:53

## 2017-12-13 RX ADMIN — CIPROFLOXACIN HYDROCHLORIDE 500 MG: 500 TABLET, FILM COATED ORAL at 22:30

## 2017-12-13 RX ADMIN — ATORVASTATIN CALCIUM 10 MG: 10 TABLET, FILM COATED ORAL at 19:24

## 2017-12-13 RX ADMIN — BACLOFEN 10 MG: 10 TABLET ORAL at 19:24

## 2017-12-13 RX ADMIN — OXYCODONE HYDROCHLORIDE AND ACETAMINOPHEN 2 TABLET: 5; 325 TABLET ORAL at 01:07

## 2017-12-13 RX ADMIN — CIPROFLOXACIN HYDROCHLORIDE 500 MG: 500 TABLET, FILM COATED ORAL at 09:53

## 2017-12-13 RX ADMIN — Medication 15 ML: at 09:58

## 2017-12-13 RX ADMIN — Medication 100 MG: at 09:58

## 2017-12-13 RX ADMIN — DOCUSATE SODIUM 100 MG: 100 CAPSULE, LIQUID FILLED ORAL at 10:02

## 2017-12-13 RX ADMIN — OXYCODONE HYDROCHLORIDE AND ACETAMINOPHEN 1 TABLET: 10; 325 TABLET ORAL at 09:53

## 2017-12-13 RX ADMIN — BRIMONIDINE TARTRATE OPHTHALMIC SOLUTION, 0.2% 1 DROP: 2 SOLUTION/ DROPS OPHTHALMIC at 09:57

## 2017-12-13 RX ADMIN — DORZOLAMIDE HYDROCHLORIDE 1 DROP: 20 SOLUTION/ DROPS OPHTHALMIC at 10:00

## 2017-12-13 RX ADMIN — SENNOSIDES AND DOCUSATE SODIUM 1 TABLET: 8.6; 5 TABLET ORAL at 09:53

## 2017-12-13 RX ADMIN — LIDOCAINE: 40 CREAM TOPICAL at 19:32

## 2017-12-13 ASSESSMENT — PAIN DESCRIPTION - ORIENTATION
ORIENTATION: RIGHT
ORIENTATION: RIGHT;PROXIMAL

## 2017-12-13 ASSESSMENT — PAIN SCALES - GENERAL
PAINLEVEL_OUTOF10: 8
PAINLEVEL_OUTOF10: 10
PAINLEVEL_OUTOF10: 8
PAINLEVEL_OUTOF10: 0
PAINLEVEL_OUTOF10: 6

## 2017-12-13 ASSESSMENT — PAIN DESCRIPTION - LOCATION
LOCATION: ARM
LOCATION: ARM;ELBOW

## 2017-12-13 ASSESSMENT — PAIN DESCRIPTION - DESCRIPTORS: DESCRIPTORS: ACHING

## 2017-12-13 ASSESSMENT — PAIN DESCRIPTION - PAIN TYPE: TYPE: ACUTE PAIN

## 2017-12-13 NOTE — PROGRESS NOTES
facility. Distance: 150'    Other exercises  Other exercises?: Yes  Other exercises 1: Seated scap retractions with VC/TC x10  Other exercises 2: TA isometrics with VC/TC 2x10     ASSESSMENT/COMMENTS:  Assessment: Frequent VC to maintain restrictions this PM. Pt requiring occ redirecting to stay on task this pm. Improved slide board transfers vs am. Portion of tx forcused on core strengthening to improve posture in seated and to assist with functional transfers.     PLAN OF CARE/Safety: Poor following of RUE restrictions          Therapy Time:   Individual   Time In 1430   Time Out 1530   Minutes 60     Minutes: 60      Transfer/Bed mobility trainin      WC: 15      Neuro re education: 12     Therapeutic ex: 8      Ana Kelly PTA, 17 at 4:55 PM

## 2017-12-13 NOTE — PROGRESS NOTES
Caregiver Education:  [x] Patient/Caregiver Educated on session and progression towards goals. [x] Patient/Caregiver stated verbal understanding of directions. [] Reinforcement needed;  [] patient   [] family    Safety Devices:  [] Call light within reach  [] Chair alarm activated  [] Bed alarm activated  [] Other:    Comprehension          Expression   []7 - Independent   []7 - Indpendent   []6 - Modified Independent  []6 - Modified Independent   []5 - Supervision   [x]5 - Supervision   [x]4 - Min Assist   []4 - Min Assist   []3 - Mod Assist   []3 - Mod Assist   []2 - Max Assist   []2 - Max Assist   []1 - Dependent   []1 - Dependent    Problem Solving        Memory   []7 - Independent   []7 - Independent   []6 - Modified Independent  []6 - Modified Independent   []5 - Supervision   []5 - Supervision   [x]4 - Min Assist   [x]4 - Min Assist   []3 - Mod Assist   []3 - Mod Assist   []2 - Max Assist   []2 - Max Assist   []1 - Dependent   [] 1 -Dependent    Nusrat Line DoigM. S., CCC-SLP 12/13/17

## 2017-12-13 NOTE — PROGRESS NOTES
day   Plan weeks: 2.5 weeks   Current Treatment Recommendations: Strengthening, ROM, Functional Mobility Training, Endurance Training, Wheelchair Mobility Training, Safety Education & Training, Self-Care / ADL, Patient/Caregiver Education & Training, Equipment Evaluation, Education, & procurement  Plan Comment: Continue with plan of care              Therapy Time   Individual Concurrent Group Co-treatment   Time In 1330         Time Out 1430         Minutes 60                 SILVANA Mccarthy  Electronically signed by SILVANA Mccarthy on 12/13/2017 at 4:40 PM

## 2017-12-13 NOTE — PROGRESS NOTES
Physical Therapy Rehab Treatment Note  Facility/Department: [facility]  Room: N267/P157-09       NAME: Frieda Camacho  : 1960 (56 y.o.)  MRN: [id number]  CODE STATUS: Full Code    Date of Service: 2017       Restrictions:  Restrictions/Precautions: Weight Bearing, ROM Restrictions, Fall Risk, General Precautions  Upper Extremity Weight Bearing Restrictions  Right Upper Extremity Weight Bearing: Non Weight Bearing  Other: No ROM R/shoulder/elbow, OK for ROM of digits  Body mass index is 27.05 kg/m². SUBJECTIVE:    Pain Screening  Patient Currently in Pain: No  Pre Treatment Pain Screening  Pain at present: 0  Scale Used: Numeric Score  Intervention List: Patient able to continue with treatment    Post Treatment Pain Screening:  Pain Assessment  Pain Assessment: 0-10  Pain Level: 0    OBJECTIVE:   Transfers  Squat Pivot Transfers: Moderate Assistance (slide board)    ASSESSMENT/COMMENTS:  Assessment: attempted to done afo's for improve DF for assist with transfers, however unable secondary to LE swelling at this time. Pt demoing decrease trunk control with slide board transfer, requiring MODA +2 to prevent retro lean. Frequent VC for UE restrictions. PLAN OF CARE/Safety: Poor safety awareness with slide board transfer          Therapy Time:   Individual   Time In 1000   Time Out 1030   Minutes 30     Minutes:30      Transfer/Bed mobility trainin      Gait training:      Neuro re education:     Therapeutic ex:       Silvia Gacria PTA, 17 at 12:44 PM

## 2017-12-13 NOTE — PROGRESS NOTES
in deep tendon reflexes or     sensation with min to mod cues for higher level issues and insight  Lungs:  Diminished CTA-B. Respiration effort is normal at rest.     Heart:   S1 = S2, RRR. No loud murmurs. Abdomen:  Soft, non-tender, no enlargement of liver or spleen. Extremities:  No significant lower extremity edema or tenderness. Skin:   Intact right heal breakdown    Rehabilitation:  Physical therapy: FIMS:  Bed Mobility: Scooting: Maximal assistance    Transfers: Bed to Chair:  (slide board. Assist to place and remove board. VCs for technique and to manage foot placement with L UE.  )  Squat Pivot Transfers: Moderate Assistance (with slide board.  ),  ,      FIMS: Bed, Chair, Wheel Chair: 1 - Requires >75% assitance to transfer  Walk: 0 - Activity Not Assessed/Does Not Occur (  )  Distance Walked: 0  Wheel Chair: 0 - Activity Not Assessed/Does Not Occur (Pt unable to propel standard WC. Pt has eletric WC but not present and unable to assess.)  Distance Traveled in Wheel Chair: <1ft  Stairs: 0 - Activity Does not Occur ( 0 only for the admission assessment), PT Equipment Recommendations  Other: Slideboard, Assessment: Decreased arryover of R UE restrictions. Pt cont to attempt to actively utilize R UE.       Occupational therapy: FIMS:  Eatin - Feeds self with setup/supervision/cues and/or requires only setup/supervision/cues to perform tube feedings  Groomin - Did not occur  Bathin - Did not occur  Dressing-Upper: 0 - Did not occur  Dressing-Lower: 0 - Did not occur  Toiletin - Total assist  Toilet Transfer: 0 - Did not occur  Tub Transfer: 0 - Activity does not occur  Shower Transfer: 0 - Activity does not occur,  ,      Speech therapy: FIMS: Comprehension: 4 - Patient understands basic needs 75-90%+ of the time  Expression: 5 - Expresses basic ideas/needs only (hungry/hot/pain)  Social Interaction: 6 - Patient requires medication for mood and/or effect  Problem Solving: 3 - Patient suppository prn, enema prn.  3. Severe right upper extremity pain and generalized OA pain, severe low back pain, catheter related pain: reassess pain every shift and prior to and after each therapy session, give prn Tylenol and   when necessary Percocet, modalities prn in therapy, Lidoderm, K-pad prn. Patient requests Lidocaine Gel / Lidocaine Patch daily, needs to schedule gel daily. Side to side turns. Add scheduled OxyContin. OARRS reviewed, regular use of Neurontin and Ultram, occasional use of Percocet, gets medications from Dr. Neot Nicole and Dr. Lolis Herron. 4. Skin healing right heel decubitus and breakdown risk: egg crate mattress, right heel Prevalon, bed extender, continue pressure relief program.  Daily skin exams and reports from nursing. 5. Fatigue due to nutritional and hydration deficiency:  continue to monitor I&Os, calorie counts prn, dietary consult prn. Add vitamin D and CoQ10  6. Acute episodic insomnia with situational adjustment disorder:  prn Ambien, monitor for day time sedation. 7. Falls risk elevated:  patient to use call light to get nursing assistance to get up, bed and chair alarm. 8. Elevated DVT risk: progressive activities in PT, continue prophylaxis ARELI hose, elevation and pneumatic compression stockings . 9. Complex discharge planning:  Discharge date is set for 12/20/17 to home alone with home health PT, OT, ST, RN, aide and . Weekly team meeting  Monday to assess progress towards goals, discuss and address social, psychological and medical comorbidities and to address difficulties they may be having progressing in therapy. Patient and family education is in progress. The patient is to follow-up with their family physician after discharge. Complex Active General Medical Issues that complicate care Assess & Plan:    1.  Triceps tendon rupture, right, initial encounter s/p repair-nonweightbearing right upper extremity no weightbearing and no range of motion to

## 2017-12-14 ENCOUNTER — APPOINTMENT (OUTPATIENT)
Dept: ULTRASOUND IMAGING | Age: 57
DRG: 850 | End: 2017-12-14
Attending: PHYSICAL MEDICINE & REHABILITATION
Payer: MEDICAID

## 2017-12-14 LAB
BACTERIA: ABNORMAL /HPF
BILIRUBIN URINE: NEGATIVE
BLOOD, URINE: ABNORMAL
CLARITY: CLEAR
COLOR: YELLOW
EPITHELIAL CELLS, UA: ABNORMAL /HPF
GLUCOSE URINE: NEGATIVE MG/DL
KETONES, URINE: NEGATIVE MG/DL
LEUKOCYTE ESTERASE, URINE: ABNORMAL
MUCUS: PRESENT
NITRITE, URINE: NEGATIVE
PH UA: 6 (ref 5–9)
PROTEIN UA: 30 MG/DL
RBC UA: ABNORMAL /HPF (ref 0–2)
SPECIFIC GRAVITY UA: 1.03 (ref 1–1.03)
UROBILINOGEN, URINE: 1 E.U./DL
WBC UA: ABNORMAL /HPF (ref 0–5)

## 2017-12-14 PROCEDURE — 99253 IP/OBS CNSLTJ NEW/EST LOW 45: CPT | Performed by: UROLOGY

## 2017-12-14 PROCEDURE — 6370000000 HC RX 637 (ALT 250 FOR IP): Performed by: NURSE PRACTITIONER

## 2017-12-14 PROCEDURE — 6370000000 HC RX 637 (ALT 250 FOR IP): Performed by: INTERNAL MEDICINE

## 2017-12-14 PROCEDURE — 87086 URINE CULTURE/COLONY COUNT: CPT

## 2017-12-14 PROCEDURE — 97112 NEUROMUSCULAR REEDUCATION: CPT

## 2017-12-14 PROCEDURE — 93925 LOWER EXTREMITY STUDY: CPT

## 2017-12-14 PROCEDURE — 81003 URINALYSIS AUTO W/O SCOPE: CPT

## 2017-12-14 PROCEDURE — 97110 THERAPEUTIC EXERCISES: CPT

## 2017-12-14 PROCEDURE — 2580000003 HC RX 258: Performed by: INTERNAL MEDICINE

## 2017-12-14 PROCEDURE — 6370000000 HC RX 637 (ALT 250 FOR IP): Performed by: PHYSICAL MEDICINE & REHABILITATION

## 2017-12-14 PROCEDURE — 6370000000 HC RX 637 (ALT 250 FOR IP): Performed by: UROLOGY

## 2017-12-14 PROCEDURE — 97535 SELF CARE MNGMENT TRAINING: CPT

## 2017-12-14 PROCEDURE — 6360000002 HC RX W HCPCS: Performed by: INTERNAL MEDICINE

## 2017-12-14 PROCEDURE — 1180000000 HC REHAB R&B

## 2017-12-14 PROCEDURE — 99232 SBSQ HOSP IP/OBS MODERATE 35: CPT | Performed by: PHYSICAL MEDICINE & REHABILITATION

## 2017-12-14 PROCEDURE — 99254 IP/OBS CNSLTJ NEW/EST MOD 60: CPT | Performed by: INTERNAL MEDICINE

## 2017-12-14 PROCEDURE — 99253 IP/OBS CNSLTJ NEW/EST LOW 45: CPT | Performed by: INTERNAL MEDICINE

## 2017-12-14 PROCEDURE — 81001 URINALYSIS AUTO W/SCOPE: CPT

## 2017-12-14 RX ORDER — ASPIRIN 81 MG/1
81 TABLET, CHEWABLE ORAL DAILY
Status: DISCONTINUED | OUTPATIENT
Start: 2017-12-14 | End: 2017-12-20 | Stop reason: HOSPADM

## 2017-12-14 RX ORDER — TAMSULOSIN HYDROCHLORIDE 0.4 MG/1
0.4 CAPSULE ORAL DAILY
Status: DISCONTINUED | OUTPATIENT
Start: 2017-12-14 | End: 2017-12-20 | Stop reason: HOSPADM

## 2017-12-14 RX ADMIN — CIPROFLOXACIN HYDROCHLORIDE 500 MG: 500 TABLET, FILM COATED ORAL at 08:24

## 2017-12-14 RX ADMIN — OXYCODONE HYDROCHLORIDE AND ACETAMINOPHEN 1 TABLET: 10; 325 TABLET ORAL at 18:02

## 2017-12-14 RX ADMIN — PANTOPRAZOLE SODIUM 40 MG: 40 TABLET, DELAYED RELEASE ORAL at 08:25

## 2017-12-14 RX ADMIN — TIMOLOL MALEATE 1 DROP: 5 SOLUTION OPHTHALMIC at 13:32

## 2017-12-14 RX ADMIN — LIDOCAINE: 40 CREAM TOPICAL at 08:30

## 2017-12-14 RX ADMIN — OXYCODONE HYDROCHLORIDE AND ACETAMINOPHEN 2 TABLET: 5; 325 TABLET ORAL at 22:28

## 2017-12-14 RX ADMIN — MIRTAZAPINE 15 MG: 15 TABLET, FILM COATED ORAL at 22:26

## 2017-12-14 RX ADMIN — Medication 15 ML: at 22:27

## 2017-12-14 RX ADMIN — ASPIRIN 81 MG 81 MG: 81 TABLET ORAL at 13:33

## 2017-12-14 RX ADMIN — BRIMONIDINE TARTRATE OPHTHALMIC SOLUTION, 0.2% 1 DROP: 2 SOLUTION/ DROPS OPHTHALMIC at 22:27

## 2017-12-14 RX ADMIN — DORZOLAMIDE HYDROCHLORIDE 1 DROP: 20 SOLUTION/ DROPS OPHTHALMIC at 22:27

## 2017-12-14 RX ADMIN — TIMOLOL MALEATE 1 DROP: 5 SOLUTION OPHTHALMIC at 22:27

## 2017-12-14 RX ADMIN — BACLOFEN 10 MG: 10 TABLET ORAL at 22:26

## 2017-12-14 RX ADMIN — DULOXETINE 60 MG: 30 CAPSULE, DELAYED RELEASE ORAL at 08:23

## 2017-12-14 RX ADMIN — BACLOFEN 10 MG: 10 TABLET ORAL at 08:24

## 2017-12-14 RX ADMIN — BRIMONIDINE TARTRATE OPHTHALMIC SOLUTION, 0.2% 1 DROP: 2 SOLUTION/ DROPS OPHTHALMIC at 08:28

## 2017-12-14 RX ADMIN — DORZOLAMIDE HYDROCHLORIDE 1 DROP: 20 SOLUTION/ DROPS OPHTHALMIC at 08:21

## 2017-12-14 RX ADMIN — Medication 100 MG: at 08:24

## 2017-12-14 RX ADMIN — OXYCODONE HYDROCHLORIDE AND ACETAMINOPHEN 1 TABLET: 10; 325 TABLET ORAL at 13:34

## 2017-12-14 RX ADMIN — CEFTAZIDIME 1 G: 1 INJECTION, POWDER, FOR SOLUTION INTRAMUSCULAR; INTRAVENOUS at 22:09

## 2017-12-14 RX ADMIN — DOCUSATE SODIUM 100 MG: 100 CAPSULE, LIQUID FILLED ORAL at 08:24

## 2017-12-14 RX ADMIN — Medication 15 ML: at 08:29

## 2017-12-14 RX ADMIN — BRIMONIDINE TARTRATE OPHTHALMIC SOLUTION, 0.2% 1 DROP: 2 SOLUTION/ DROPS OPHTHALMIC at 13:37

## 2017-12-14 RX ADMIN — Medication 100 MG: at 13:33

## 2017-12-14 RX ADMIN — GABAPENTIN 1200 MG: 400 CAPSULE ORAL at 08:22

## 2017-12-14 RX ADMIN — OXYCODONE HYDROCHLORIDE AND ACETAMINOPHEN 2 TABLET: 5; 325 TABLET ORAL at 01:52

## 2017-12-14 RX ADMIN — CHLORHEXIDINE GLUCONATE 15 ML: 213 SOLUTION TOPICAL at 08:21

## 2017-12-14 RX ADMIN — TAMSULOSIN HYDROCHLORIDE 0.4 MG: 0.4 CAPSULE ORAL at 18:02

## 2017-12-14 RX ADMIN — LIDOCAINE: 40 CREAM TOPICAL at 22:27

## 2017-12-14 RX ADMIN — GABAPENTIN 1200 MG: 400 CAPSULE ORAL at 22:26

## 2017-12-14 RX ADMIN — OXYCODONE HYDROCHLORIDE AND ACETAMINOPHEN 1 TABLET: 10; 325 TABLET ORAL at 08:23

## 2017-12-14 RX ADMIN — SENNOSIDES AND DOCUSATE SODIUM 1 TABLET: 8.6; 5 TABLET ORAL at 08:25

## 2017-12-14 RX ADMIN — ATORVASTATIN CALCIUM 10 MG: 10 TABLET, FILM COATED ORAL at 22:26

## 2017-12-14 RX ADMIN — CEFTAZIDIME 1 G: 1 INJECTION, POWDER, FOR SOLUTION INTRAMUSCULAR; INTRAVENOUS at 17:58

## 2017-12-14 ASSESSMENT — PAIN SCALES - GENERAL
PAINLEVEL_OUTOF10: 4
PAINLEVEL_OUTOF10: 6
PAINLEVEL_OUTOF10: 5
PAINLEVEL_OUTOF10: 6
PAINLEVEL_OUTOF10: 6
PAINLEVEL_OUTOF10: 7
PAINLEVEL_OUTOF10: 10
PAINLEVEL_OUTOF10: 7

## 2017-12-14 ASSESSMENT — ENCOUNTER SYMPTOMS
EYE DISCHARGE: 0
ABDOMINAL DISTENTION: 0
COUGH: 0
ABDOMINAL PAIN: 0
RESPIRATORY NEGATIVE: 1
TROUBLE SWALLOWING: 0
CHEST TIGHTNESS: 0
SHORTNESS OF BREATH: 0
EYE PAIN: 0
WHEEZING: 0
NAUSEA: 0
BACK PAIN: 1
CHOKING: 0
COLOR CHANGE: 0
APNEA: 0
STRIDOR: 0
GASTROINTESTINAL NEGATIVE: 1

## 2017-12-14 ASSESSMENT — PAIN DESCRIPTION - PAIN TYPE
TYPE: CHRONIC PAIN
TYPE: CHRONIC PAIN
TYPE: ACUTE PAIN

## 2017-12-14 ASSESSMENT — PAIN DESCRIPTION - LOCATION
LOCATION: BACK
LOCATION: ARM
LOCATION: BACK
LOCATION: BACK

## 2017-12-14 ASSESSMENT — PAIN DESCRIPTION - FREQUENCY: FREQUENCY: INTERMITTENT

## 2017-12-14 ASSESSMENT — PAIN DESCRIPTION - ORIENTATION: ORIENTATION: RIGHT

## 2017-12-14 NOTE — CONSULTS
Leukocyte Esterase, Urine LARGE   Negative Final               Patient Active Problem List   Diagnosis    Triceps tendon rupture, right, initial encounter    Disorder of muscle, ligament, and fascia    Lack of coordination    Paraplegia ([address]    Abnormality of gait and mobility w/paraplegia due to Rt triceps tendon rupture s/p repair, [facility] 12/06/17    Asthma    Hypertension    Legally blind    MRSA infection within last 3 months    Hard of hearing    Cognitive deficits    Chronic bilateral low back pain with bilateral sciatica    Claudication ([address]       ASSESSMENT:  PLAN:    UTI growing Pseudomonas Klebsiella sensitivities pending  Ceftazidime 1 g every 8 hours IV for 5 days   concerned about the use of Cipro and recent tendon rupture  Repeat UA C&S

## 2017-12-14 NOTE — PROGRESS NOTES
Patient threw phone in room, cussing and irritable. Refusing IV and IV ATB.  \"I will leave if I have to have a IV\"

## 2017-12-14 NOTE — PROGRESS NOTES
Transfers: Minimal assistance  Wheelchair Bed Transfers  Wheelchair/Bed - Technique: Sit pivot  Equipment Used: Bed;Wheelchair  Level of Asssistance: Minimal assistance  Bed mobility  Rolling to Left: Stand by assistance  Sit to Supine: Stand by assistance  Transfers  Sit Pivot Transfers: Minimal assistance                                                                 Assessment      Activity Tolerance  Activity Tolerance: Patient Tolerated treatment well  Safety Devices  Safety Devices in place: Yes  Type of devices:  All fall risk precautions in place       Discharge Recommendations:  Continue to assess pending progress     Plan   Plan  Times per week: 5-7 times per week for 15-90 minutes a day   Plan weeks: 2.5 weeks   Current Treatment Recommendations: Strengthening, ROM, Functional Mobility Training, Endurance Training, Wheelchair Mobility Training, Safety Education & Training, Self-Care / ADL, Patient/Caregiver Education & Training, Equipment Evaluation, Education, & procurement  Plan Comment: Continue with plan of care            Therapy Time   Individual Concurrent Group Co-treatment   Time In 0900         Time Out 1030         Minutes 90                 SILVANA Davis    Electronically signed by SILVANA Davis on 12/14/2017 at 12:59 PM
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
## 2017-12-14 NOTE — PROGRESS NOTES
gentamicin (conventional dosing) 1.7mg/kg of ideal body weight (79.9kg) = gentamicin 136 mg IV every 8 hours. Aminoglycoside therapy requires appropriately timed drawing of peak and trough levels. For Q8 hour interval dosing, peak recommended to be drawn 30 minutes AFTER the 3rd dose; trough level to be drawn 30-60 minutes PRIOR to the 4th dose. PEAK ordered 12/15/17 at 0900 (30 minutes after completion of 30 minute infusion beginning at 0800) and TROUGH ordered 12/15/17 at 1530, 30 minutes prior to 4th dose due at 1600. Goal PEAK 4-8; goal TROUGH <1-2. Will order gentamicin peak and trough panel as such. Also recommend obtaining updated serum creatinine since aminoglycosides are nephrotoxic agents. Thank you for the consult, Dr. Jorge Vicente, pharmacy will continue to follow.     Padmini Sandy, PharmD   12/14/2017 3:06 PM

## 2017-12-14 NOTE — PROGRESS NOTES
mobility training:15      Gait training:      Neuro re education:15     Therapeutic ex:      Héctor Mcclain PTA, 12/14/17 at 1:56 PM

## 2017-12-14 NOTE — PROGRESS NOTES
apparent distress, appears stated age and cooperative. HEENT: Pupils equal, round, and reactive to light. Conjunctivae/corneas clear. Neck: Supple, with full range of motion. No jugular venous distention. Trachea midline. Respiratory:  Normal respiratory effort. Clear to auscultation, bilaterally without Rales/Wheezes/Rhonchi. Cardiovascular: Regular rate and rhythm with normal S1/S2 without murmurs, rubs or gallops. Abdomen: Soft, non-tender, non-distended with normal bowel sounds. Musculoskeletal: No clubbing, cyanosis or edema bilaterally. Full range of motion without deformity. Skin: Skin color, texture, turgor normal.  No rashes or lesions. Neurologic:   grossly non-focal.  Ext: His lower extremities are cool bilaterally and appear slightly dusky . DP Pulses are however palpable bilaterally. Labs:     No results found for this or any previous visit (from the past 24 hour(s)). Assessment/Plan:  56 yr old male receiving acute rehab for a right triceps tendon rupture following repair. He has the following active medical problems:    1. Hyperlipidemia-Lipitor  2. Depression-Cymbalta  3. UTI-receiving cipro for x 5 days  4. Likely PVD-cardiology consult  5. Urinary retention-Agree with continuation of Nieto catheter. Will additionally obtain urology consultation.       Active Hospital Problems    Diagnosis Date Noted    Abnormality of gait and mobility w/paraplegia due to Rt triceps tendon rupture s/p repair, [facility] admit 12/06/17 [R26.9] 12/07/2017    Cognitive deficits [R41.89] 12/07/2017    Chronic bilateral low back pain with bilateral sciatica [M54.42, M54.41, G89.29] 12/07/2017    Asthma [J45.909]     Hypertension [I10]     Legally blind [H54.8]     MRSA infection within last 3 months [Z86.14]     Hard of hearing [H91.90]     Triceps tendon rupture, right, initial encounter Wero Luis 12/05/2017    Paraplegia ([facility] [address] [G82.20] 10/04/2002         DVT Prophylaxis: not
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
## 2017-12-15 PROCEDURE — 6370000000 HC RX 637 (ALT 250 FOR IP): Performed by: NURSE PRACTITIONER

## 2017-12-15 PROCEDURE — 6360000002 HC RX W HCPCS: Performed by: INTERNAL MEDICINE

## 2017-12-15 PROCEDURE — 2580000003 HC RX 258: Performed by: INTERNAL MEDICINE

## 2017-12-15 PROCEDURE — 1180000000 HC REHAB R&B

## 2017-12-15 PROCEDURE — 97535 SELF CARE MNGMENT TRAINING: CPT

## 2017-12-15 PROCEDURE — 99232 SBSQ HOSP IP/OBS MODERATE 35: CPT | Performed by: PHYSICAL MEDICINE & REHABILITATION

## 2017-12-15 PROCEDURE — 6370000000 HC RX 637 (ALT 250 FOR IP): Performed by: UROLOGY

## 2017-12-15 PROCEDURE — 97112 NEUROMUSCULAR REEDUCATION: CPT

## 2017-12-15 PROCEDURE — 97110 THERAPEUTIC EXERCISES: CPT

## 2017-12-15 PROCEDURE — 6370000000 HC RX 637 (ALT 250 FOR IP): Performed by: PHYSICAL MEDICINE & REHABILITATION

## 2017-12-15 PROCEDURE — 97530 THERAPEUTIC ACTIVITIES: CPT

## 2017-12-15 PROCEDURE — 6360000002 HC RX W HCPCS: Performed by: PHYSICAL MEDICINE & REHABILITATION

## 2017-12-15 RX ORDER — OXYCODONE HCL 20 MG/1
20 TABLET, FILM COATED, EXTENDED RELEASE ORAL EVERY 12 HOURS SCHEDULED
Status: DISCONTINUED | OUTPATIENT
Start: 2017-12-15 | End: 2017-12-18

## 2017-12-15 RX ADMIN — LIDOCAINE: 40 CREAM TOPICAL at 08:29

## 2017-12-15 RX ADMIN — Medication 15 ML: at 22:17

## 2017-12-15 RX ADMIN — OXYCODONE HYDROCHLORIDE AND ACETAMINOPHEN 1 TABLET: 10; 325 TABLET ORAL at 08:24

## 2017-12-15 RX ADMIN — BACLOFEN 10 MG: 10 TABLET ORAL at 22:16

## 2017-12-15 RX ADMIN — PANTOPRAZOLE SODIUM 40 MG: 40 TABLET, DELAYED RELEASE ORAL at 08:24

## 2017-12-15 RX ADMIN — DOCUSATE SODIUM 100 MG: 100 CAPSULE, LIQUID FILLED ORAL at 08:24

## 2017-12-15 RX ADMIN — OXYCODONE HYDROCHLORIDE AND ACETAMINOPHEN 2 TABLET: 5; 325 TABLET ORAL at 17:44

## 2017-12-15 RX ADMIN — TIMOLOL MALEATE 1 DROP: 5 SOLUTION OPHTHALMIC at 22:21

## 2017-12-15 RX ADMIN — BRIMONIDINE TARTRATE OPHTHALMIC SOLUTION, 0.2% 1 DROP: 2 SOLUTION/ DROPS OPHTHALMIC at 08:27

## 2017-12-15 RX ADMIN — OXYCODONE HYDROCHLORIDE 20 MG: 20 TABLET, FILM COATED, EXTENDED RELEASE ORAL at 22:17

## 2017-12-15 RX ADMIN — OXYCODONE HYDROCHLORIDE AND ACETAMINOPHEN 2 TABLET: 5; 325 TABLET ORAL at 04:21

## 2017-12-15 RX ADMIN — BRIMONIDINE TARTRATE OPHTHALMIC SOLUTION, 0.2% 1 DROP: 2 SOLUTION/ DROPS OPHTHALMIC at 22:25

## 2017-12-15 RX ADMIN — ASPIRIN 81 MG 81 MG: 81 TABLET ORAL at 08:24

## 2017-12-15 RX ADMIN — DORZOLAMIDE HYDROCHLORIDE 1 DROP: 20 SOLUTION/ DROPS OPHTHALMIC at 13:14

## 2017-12-15 RX ADMIN — OXYCODONE HYDROCHLORIDE 20 MG: 20 TABLET, FILM COATED, EXTENDED RELEASE ORAL at 13:13

## 2017-12-15 RX ADMIN — BACLOFEN 10 MG: 10 TABLET ORAL at 08:26

## 2017-12-15 RX ADMIN — DULOXETINE 60 MG: 30 CAPSULE, DELAYED RELEASE ORAL at 08:23

## 2017-12-15 RX ADMIN — GABAPENTIN 1200 MG: 400 CAPSULE ORAL at 22:15

## 2017-12-15 RX ADMIN — CYANOCOBALAMIN 1000 MCG: 1000 INJECTION, SOLUTION INTRAMUSCULAR at 08:26

## 2017-12-15 RX ADMIN — CEFTAZIDIME 1 G: 1 INJECTION, POWDER, FOR SOLUTION INTRAMUSCULAR; INTRAVENOUS at 13:16

## 2017-12-15 RX ADMIN — ATORVASTATIN CALCIUM 10 MG: 10 TABLET, FILM COATED ORAL at 22:15

## 2017-12-15 RX ADMIN — GABAPENTIN 1200 MG: 400 CAPSULE ORAL at 08:25

## 2017-12-15 RX ADMIN — MIRTAZAPINE 15 MG: 15 TABLET, FILM COATED ORAL at 22:16

## 2017-12-15 RX ADMIN — OXYCODONE HYDROCHLORIDE AND ACETAMINOPHEN 1 TABLET: 10; 325 TABLET ORAL at 14:46

## 2017-12-15 RX ADMIN — TAMSULOSIN HYDROCHLORIDE 0.4 MG: 0.4 CAPSULE ORAL at 17:45

## 2017-12-15 RX ADMIN — Medication 100 MG: at 08:25

## 2017-12-15 RX ADMIN — BRIMONIDINE TARTRATE OPHTHALMIC SOLUTION, 0.2% 1 DROP: 2 SOLUTION/ DROPS OPHTHALMIC at 13:23

## 2017-12-15 RX ADMIN — TIMOLOL MALEATE 1 DROP: 5 SOLUTION OPHTHALMIC at 08:22

## 2017-12-15 RX ADMIN — LIDOCAINE: 40 CREAM TOPICAL at 22:22

## 2017-12-15 RX ADMIN — SENNOSIDES AND DOCUSATE SODIUM 1 TABLET: 8.6; 5 TABLET ORAL at 08:26

## 2017-12-15 RX ADMIN — CHLORHEXIDINE GLUCONATE 15 ML: 213 SOLUTION TOPICAL at 08:23

## 2017-12-15 RX ADMIN — Medication 15 ML: at 08:23

## 2017-12-15 RX ADMIN — DORZOLAMIDE HYDROCHLORIDE 1 DROP: 20 SOLUTION/ DROPS OPHTHALMIC at 22:23

## 2017-12-15 RX ADMIN — Medication 100 MG: at 13:15

## 2017-12-15 RX ADMIN — CEFTAZIDIME 1 G: 1 INJECTION, POWDER, FOR SOLUTION INTRAMUSCULAR; INTRAVENOUS at 22:26

## 2017-12-15 RX ADMIN — CEFTAZIDIME 1 G: 1 INJECTION, POWDER, FOR SOLUTION INTRAMUSCULAR; INTRAVENOUS at 05:27

## 2017-12-15 RX ADMIN — DOCUSATE SODIUM 100 MG: 100 CAPSULE, LIQUID FILLED ORAL at 22:16

## 2017-12-15 ASSESSMENT — PAIN DESCRIPTION - PAIN TYPE: TYPE: SURGICAL PAIN

## 2017-12-15 ASSESSMENT — PAIN SCALES - GENERAL
PAINLEVEL_OUTOF10: 5
PAINLEVEL_OUTOF10: 3
PAINLEVEL_OUTOF10: 4
PAINLEVEL_OUTOF10: 2
PAINLEVEL_OUTOF10: 4
PAINLEVEL_OUTOF10: 4
PAINLEVEL_OUTOF10: 6
PAINLEVEL_OUTOF10: 4
PAINLEVEL_OUTOF10: 0
PAINLEVEL_OUTOF10: 10

## 2017-12-15 ASSESSMENT — PAIN DESCRIPTION - LOCATION
LOCATION: BACK
LOCATION: ARM

## 2017-12-15 ASSESSMENT — PAIN DESCRIPTION - DESCRIPTORS
DESCRIPTORS: ACHING
DESCRIPTORS: ACHING

## 2017-12-15 ASSESSMENT — PAIN DESCRIPTION - ORIENTATION
ORIENTATION: RIGHT
ORIENTATION: LOWER

## 2017-12-15 ASSESSMENT — PAIN DESCRIPTION - FREQUENCY: FREQUENCY: CONTINUOUS

## 2017-12-15 NOTE — PROGRESS NOTES
Occupational Therapy  Facility/Department: [facility]  Daily Treatment Note  NAME: Damian Santana  : 1960  MRN: [id number]    Date of Service: 12/15/2017    Patient Diagnosis(es): There were no encounter diagnoses. has a past medical history of Abnormality of gait and mobility w/paraplegia due to Rt triceps tendon rupture s/p repair, [facility] admit 17; Asthma; Hard of hearing; Hypertension; Legally blind; MRSA infection within last 3 months; Paraplegia ([facility] [address] and Triceps tendon rupture, right, initial encounter. has a past surgical history that includes Biceps tendon repair (Right, 2017). Restrictions  Restrictions/Precautions  Restrictions/Precautions: Weight Bearing, ROM Restrictions, Fall Risk, General Precautions  Required Braces or Orthoses?: No  Upper Extremity Weight Bearing Restrictions  Right Upper Extremity Weight Bearing: Non Weight Bearing  Other: No ROM R/shoulder/elbow, OK for ROM of digits  Subjective   General  Chart Reviewed: Yes      Orientation   visual acuity deficits; pt extremely [place]; 3/10 pain pre and post session; O x 3  Objective        Pt completed B hand coordination tasks via nuts and bolts alternating between which hand grasps bolt and which hand screws nut on bolt; pt able to complete task with MIN verbal cues to attend to task and due to visual impairment deficits with noted improvement in overall fine motor coordination per pt report. Assessment      Activity Tolerance  Activity Tolerance: Patient Tolerated treatment well  Safety Devices  Safety Devices in place: Yes  Type of devices:  All fall risk precautions in place       Discharge Recommendations:  Continue to assess pending progress     Plan   Plan  Times per week: 5-7 times per week for 15-90 minutes a day   Plan weeks: 2.5 weeks   Current Treatment Recommendations: Strengthening, ROM, Functional Mobility Training, Endurance Training, Wheelchair Mobility Training, Safety Education & Training, Self-Care / ADL, Patient/Caregiver Education & Training, Equipment Evaluation, Education, & procurement  Plan Comment: Continue with plan of care  G-Code     OutComes Score                                           AM-PAC Score             Goals          Therapy Time   Individual Concurrent Group Co-treatment   Time In 1400         Time Out 1430         Minutes 30 Maya Beatrice, OTR/L
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
## 2017-12-15 NOTE — PROGRESS NOTES
Call to [facility] regarding patients soft cast feeling tight around his thumb. She expressed we could pad the thumb area and she was going to place a note for Dr. Harjit Nicole to see patient.

## 2017-12-16 LAB — URINE CULTURE, ROUTINE: NORMAL

## 2017-12-16 PROCEDURE — 6370000000 HC RX 637 (ALT 250 FOR IP): Performed by: NURSE PRACTITIONER

## 2017-12-16 PROCEDURE — 6360000002 HC RX W HCPCS: Performed by: INTERNAL MEDICINE

## 2017-12-16 PROCEDURE — 6370000000 HC RX 637 (ALT 250 FOR IP): Performed by: UROLOGY

## 2017-12-16 PROCEDURE — 6370000000 HC RX 637 (ALT 250 FOR IP): Performed by: PHYSICAL MEDICINE & REHABILITATION

## 2017-12-16 PROCEDURE — 2580000003 HC RX 258: Performed by: INTERNAL MEDICINE

## 2017-12-16 PROCEDURE — 1180000000 HC REHAB R&B

## 2017-12-16 RX ADMIN — Medication 15 ML: at 20:08

## 2017-12-16 RX ADMIN — Medication 15 ML: at 08:05

## 2017-12-16 RX ADMIN — DOCUSATE SODIUM 100 MG: 100 CAPSULE, LIQUID FILLED ORAL at 20:08

## 2017-12-16 RX ADMIN — OXYCODONE HYDROCHLORIDE AND ACETAMINOPHEN 1 TABLET: 10; 325 TABLET ORAL at 12:26

## 2017-12-16 RX ADMIN — BACLOFEN 10 MG: 10 TABLET ORAL at 08:05

## 2017-12-16 RX ADMIN — Medication 100 MG: at 12:26

## 2017-12-16 RX ADMIN — BRIMONIDINE TARTRATE OPHTHALMIC SOLUTION, 0.2% 1 DROP: 2 SOLUTION/ DROPS OPHTHALMIC at 20:07

## 2017-12-16 RX ADMIN — BRIMONIDINE TARTRATE OPHTHALMIC SOLUTION, 0.2% 1 DROP: 2 SOLUTION/ DROPS OPHTHALMIC at 13:58

## 2017-12-16 RX ADMIN — ATORVASTATIN CALCIUM 10 MG: 10 TABLET, FILM COATED ORAL at 20:08

## 2017-12-16 RX ADMIN — OXYCODONE HYDROCHLORIDE 20 MG: 20 TABLET, FILM COATED, EXTENDED RELEASE ORAL at 09:32

## 2017-12-16 RX ADMIN — Medication 100 MG: at 08:05

## 2017-12-16 RX ADMIN — CEFTAZIDIME 1 G: 1 INJECTION, POWDER, FOR SOLUTION INTRAMUSCULAR; INTRAVENOUS at 13:56

## 2017-12-16 RX ADMIN — DORZOLAMIDE HYDROCHLORIDE 1 DROP: 20 SOLUTION/ DROPS OPHTHALMIC at 20:07

## 2017-12-16 RX ADMIN — BRIMONIDINE TARTRATE OPHTHALMIC SOLUTION, 0.2% 1 DROP: 2 SOLUTION/ DROPS OPHTHALMIC at 08:06

## 2017-12-16 RX ADMIN — BACLOFEN 10 MG: 10 TABLET ORAL at 20:08

## 2017-12-16 RX ADMIN — CEFTAZIDIME 1 G: 1 INJECTION, POWDER, FOR SOLUTION INTRAMUSCULAR; INTRAVENOUS at 05:58

## 2017-12-16 RX ADMIN — OXYCODONE HYDROCHLORIDE AND ACETAMINOPHEN 2 TABLET: 5; 325 TABLET ORAL at 03:18

## 2017-12-16 RX ADMIN — OXYCODONE HYDROCHLORIDE AND ACETAMINOPHEN 1 TABLET: 10; 325 TABLET ORAL at 08:04

## 2017-12-16 RX ADMIN — TAMSULOSIN HYDROCHLORIDE 0.4 MG: 0.4 CAPSULE ORAL at 20:08

## 2017-12-16 RX ADMIN — TIMOLOL MALEATE 1 DROP: 5 SOLUTION OPHTHALMIC at 20:07

## 2017-12-16 RX ADMIN — CHLORHEXIDINE GLUCONATE 15 ML: 213 SOLUTION TOPICAL at 08:05

## 2017-12-16 RX ADMIN — DULOXETINE 60 MG: 30 CAPSULE, DELAYED RELEASE ORAL at 08:05

## 2017-12-16 RX ADMIN — LIDOCAINE: 40 CREAM TOPICAL at 20:08

## 2017-12-16 RX ADMIN — CEFTAZIDIME 1 G: 1 INJECTION, POWDER, FOR SOLUTION INTRAMUSCULAR; INTRAVENOUS at 22:23

## 2017-12-16 RX ADMIN — DOCUSATE SODIUM 100 MG: 100 CAPSULE, LIQUID FILLED ORAL at 08:11

## 2017-12-16 RX ADMIN — TIMOLOL MALEATE 1 DROP: 5 SOLUTION OPHTHALMIC at 08:06

## 2017-12-16 RX ADMIN — OXYCODONE HYDROCHLORIDE 20 MG: 20 TABLET, FILM COATED, EXTENDED RELEASE ORAL at 22:23

## 2017-12-16 RX ADMIN — LIDOCAINE: 40 CREAM TOPICAL at 08:06

## 2017-12-16 RX ADMIN — SENNOSIDES AND DOCUSATE SODIUM 1 TABLET: 8.6; 5 TABLET ORAL at 08:05

## 2017-12-16 RX ADMIN — MIRTAZAPINE 15 MG: 15 TABLET, FILM COATED ORAL at 20:08

## 2017-12-16 RX ADMIN — PANTOPRAZOLE SODIUM 40 MG: 40 TABLET, DELAYED RELEASE ORAL at 08:05

## 2017-12-16 RX ADMIN — DORZOLAMIDE HYDROCHLORIDE 1 DROP: 20 SOLUTION/ DROPS OPHTHALMIC at 08:06

## 2017-12-16 RX ADMIN — GABAPENTIN 1200 MG: 400 CAPSULE ORAL at 08:04

## 2017-12-16 RX ADMIN — GABAPENTIN 1200 MG: 400 CAPSULE ORAL at 20:08

## 2017-12-16 RX ADMIN — ASPIRIN 81 MG 81 MG: 81 TABLET ORAL at 08:05

## 2017-12-16 RX ADMIN — OXYCODONE HYDROCHLORIDE AND ACETAMINOPHEN 1 TABLET: 10; 325 TABLET ORAL at 20:09

## 2017-12-16 ASSESSMENT — PAIN DESCRIPTION - ORIENTATION
ORIENTATION: RIGHT

## 2017-12-16 ASSESSMENT — PAIN DESCRIPTION - DESCRIPTORS
DESCRIPTORS: ACHING

## 2017-12-16 ASSESSMENT — PAIN SCALES - GENERAL
PAINLEVEL_OUTOF10: 5
PAINLEVEL_OUTOF10: 4
PAINLEVEL_OUTOF10: 3
PAINLEVEL_OUTOF10: 8
PAINLEVEL_OUTOF10: 4
PAINLEVEL_OUTOF10: 5
PAINLEVEL_OUTOF10: 8
PAINLEVEL_OUTOF10: 6

## 2017-12-16 ASSESSMENT — PAIN DESCRIPTION - LOCATION
LOCATION: ARM;ELBOW
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
## 2017-12-16 NOTE — PLAN OF CARE
Problem: Coping:  Intervention: Educate to support groups  Patient declined attendance to the rehab support group secondary to discomfort in BLE's and having his soft cast redone.

## 2017-12-17 PROCEDURE — 2580000003 HC RX 258: Performed by: INTERNAL MEDICINE

## 2017-12-17 PROCEDURE — 97140 MANUAL THERAPY 1/> REGIONS: CPT

## 2017-12-17 PROCEDURE — 6370000000 HC RX 637 (ALT 250 FOR IP): Performed by: NURSE PRACTITIONER

## 2017-12-17 PROCEDURE — 1180000000 HC REHAB R&B

## 2017-12-17 PROCEDURE — 6370000000 HC RX 637 (ALT 250 FOR IP): Performed by: PHYSICAL MEDICINE & REHABILITATION

## 2017-12-17 PROCEDURE — 6370000000 HC RX 637 (ALT 250 FOR IP): Performed by: UROLOGY

## 2017-12-17 PROCEDURE — 6360000002 HC RX W HCPCS: Performed by: INTERNAL MEDICINE

## 2017-12-17 PROCEDURE — 97110 THERAPEUTIC EXERCISES: CPT

## 2017-12-17 RX ADMIN — SENNOSIDES AND DOCUSATE SODIUM 1 TABLET: 8.6; 5 TABLET ORAL at 09:10

## 2017-12-17 RX ADMIN — CEFTAZIDIME 1 G: 1 INJECTION, POWDER, FOR SOLUTION INTRAMUSCULAR; INTRAVENOUS at 22:11

## 2017-12-17 RX ADMIN — Medication 100 MG: at 13:28

## 2017-12-17 RX ADMIN — OXYCODONE HYDROCHLORIDE AND ACETAMINOPHEN 1 TABLET: 10; 325 TABLET ORAL at 10:13

## 2017-12-17 RX ADMIN — DULOXETINE 60 MG: 30 CAPSULE, DELAYED RELEASE ORAL at 09:10

## 2017-12-17 RX ADMIN — BACLOFEN 10 MG: 10 TABLET ORAL at 22:05

## 2017-12-17 RX ADMIN — TIMOLOL MALEATE 1 DROP: 5 SOLUTION OPHTHALMIC at 22:26

## 2017-12-17 RX ADMIN — Medication 15 ML: at 22:19

## 2017-12-17 RX ADMIN — DOCUSATE SODIUM 100 MG: 100 CAPSULE, LIQUID FILLED ORAL at 22:20

## 2017-12-17 RX ADMIN — LIDOCAINE: 40 CREAM TOPICAL at 09:06

## 2017-12-17 RX ADMIN — MIRTAZAPINE 15 MG: 15 TABLET, FILM COATED ORAL at 22:23

## 2017-12-17 RX ADMIN — CEFTAZIDIME 1 G: 1 INJECTION, POWDER, FOR SOLUTION INTRAMUSCULAR; INTRAVENOUS at 13:28

## 2017-12-17 RX ADMIN — TAMSULOSIN HYDROCHLORIDE 0.4 MG: 0.4 CAPSULE ORAL at 22:26

## 2017-12-17 RX ADMIN — CEFTAZIDIME 1 G: 1 INJECTION, POWDER, FOR SOLUTION INTRAMUSCULAR; INTRAVENOUS at 06:29

## 2017-12-17 RX ADMIN — PANTOPRAZOLE SODIUM 40 MG: 40 TABLET, DELAYED RELEASE ORAL at 09:11

## 2017-12-17 RX ADMIN — GABAPENTIN 1200 MG: 400 CAPSULE ORAL at 09:09

## 2017-12-17 RX ADMIN — MAGNESIUM HYDROXIDE 30 ML: 400 SUSPENSION ORAL at 09:09

## 2017-12-17 RX ADMIN — ASPIRIN 81 MG 81 MG: 81 TABLET ORAL at 09:11

## 2017-12-17 RX ADMIN — TIMOLOL MALEATE 1 DROP: 5 SOLUTION OPHTHALMIC at 09:09

## 2017-12-17 RX ADMIN — DORZOLAMIDE HYDROCHLORIDE 1 DROP: 20 SOLUTION/ DROPS OPHTHALMIC at 09:23

## 2017-12-17 RX ADMIN — DORZOLAMIDE HYDROCHLORIDE 1 DROP: 20 SOLUTION/ DROPS OPHTHALMIC at 22:21

## 2017-12-17 RX ADMIN — BRIMONIDINE TARTRATE OPHTHALMIC SOLUTION, 0.2% 1 DROP: 2 SOLUTION/ DROPS OPHTHALMIC at 13:28

## 2017-12-17 RX ADMIN — Medication 100 MG: at 09:10

## 2017-12-17 RX ADMIN — BRIMONIDINE TARTRATE OPHTHALMIC SOLUTION, 0.2% 1 DROP: 2 SOLUTION/ DROPS OPHTHALMIC at 09:09

## 2017-12-17 RX ADMIN — GABAPENTIN 1200 MG: 400 CAPSULE ORAL at 22:22

## 2017-12-17 RX ADMIN — BACLOFEN 10 MG: 10 TABLET ORAL at 09:11

## 2017-12-17 RX ADMIN — CHLORHEXIDINE GLUCONATE: 213 SOLUTION TOPICAL at 09:23

## 2017-12-17 RX ADMIN — ATORVASTATIN CALCIUM 10 MG: 10 TABLET, FILM COATED ORAL at 22:04

## 2017-12-17 RX ADMIN — DOCUSATE SODIUM 100 MG: 100 CAPSULE, LIQUID FILLED ORAL at 09:11

## 2017-12-17 RX ADMIN — BRIMONIDINE TARTRATE OPHTHALMIC SOLUTION, 0.2% 1 DROP: 2 SOLUTION/ DROPS OPHTHALMIC at 22:07

## 2017-12-17 RX ADMIN — OXYCODONE HYDROCHLORIDE 20 MG: 20 TABLET, FILM COATED, EXTENDED RELEASE ORAL at 09:09

## 2017-12-17 RX ADMIN — Medication 15 ML: at 09:12

## 2017-12-17 ASSESSMENT — PAIN SCALES - GENERAL
PAINLEVEL_OUTOF10: 0
PAINLEVEL_OUTOF10: 0
PAINLEVEL_OUTOF10: 3
PAINLEVEL_OUTOF10: 5
PAINLEVEL_OUTOF10: 4

## 2017-12-17 ASSESSMENT — PAIN DESCRIPTION - ORIENTATION: ORIENTATION: RIGHT

## 2017-12-17 ASSESSMENT — PAIN DESCRIPTION - DESCRIPTORS: DESCRIPTORS: ACHING;THROBBING

## 2017-12-17 NOTE — PROGRESS NOTES
Subjective: The patient complains of moderate to severe acute right arm pain and acute on chronic mid and low back pain partially relieved by  Percocet and exacerbated by  ROM and palpation. His Urine C&S is positive for 75,000 CFU pseudomonas and 100,000 CFU Klebsiella usually sensitive to Cipro, use with caution at low dose due to tendon rupture, consider Infectious Disease consult. Discussed with Dr. Mary Read, consider Flomax. He complains of catheter related pain. I will also consult infectious disease and change him to IV gentamicin if possible to avoid the use the quinolones. He has occasional angry outbursts due to confusion. He is doing well with mood status post attending support group.  is working on getting him a new power chair. I will have recreational therapy work with him and rehabilitation psychology and consider psychiatry. He is eager for discharge, he wants discharge to home with extended waiver services with wheelchair. He complains of cold left foot    ROS x10: The patient also complains of severely impaired mobility and activities of daily living. Otherwise no new problems with vision, hearing, nose, mouth, throat, dermal, cardiovascular, GI, , pulmonary, musculoskeletal, psychiatric or neurological. See Rehab H&P on Rehab chart dated 12/07/17. Vital signs:  /68   Pulse 63   Temp 94 °F (34.4 °C) (Oral)   Resp 18   Ht 6' 1\" (1.854 m)   Wt 205 lb (93 kg)   SpO2 94%   BMI 27.05 kg/m²   I/O:   PO/Intake:  fair PO intake, no problems observed or reported. Bowel/Bladder:  Continent of stool, constipation. Nieto catheter. Severe UTI (Cipro-hoping to transition to IV gentamicin). Neurogenic    bowel and bladder. General:  Patient is well developed, adequately nourished, non-obese and     well kempt. HEENT:    Legally blind PERRLA, severely hard of hearing hearing intact to loud voice, external inspection of     ear and nose benign.

## 2017-12-18 PROCEDURE — 1180000000 HC REHAB R&B

## 2017-12-18 PROCEDURE — 97112 NEUROMUSCULAR REEDUCATION: CPT

## 2017-12-18 PROCEDURE — 6370000000 HC RX 637 (ALT 250 FOR IP): Performed by: NURSE PRACTITIONER

## 2017-12-18 PROCEDURE — 2580000003 HC RX 258: Performed by: INTERNAL MEDICINE

## 2017-12-18 PROCEDURE — 6360000002 HC RX W HCPCS: Performed by: INTERNAL MEDICINE

## 2017-12-18 PROCEDURE — 6370000000 HC RX 637 (ALT 250 FOR IP): Performed by: PHYSICAL MEDICINE & REHABILITATION

## 2017-12-18 PROCEDURE — 99233 SBSQ HOSP IP/OBS HIGH 50: CPT | Performed by: PHYSICAL MEDICINE & REHABILITATION

## 2017-12-18 PROCEDURE — 97535 SELF CARE MNGMENT TRAINING: CPT

## 2017-12-18 PROCEDURE — 99231 SBSQ HOSP IP/OBS SF/LOW 25: CPT | Performed by: PHYSICIAN ASSISTANT

## 2017-12-18 PROCEDURE — 6370000000 HC RX 637 (ALT 250 FOR IP): Performed by: UROLOGY

## 2017-12-18 PROCEDURE — 99232 SBSQ HOSP IP/OBS MODERATE 35: CPT | Performed by: INTERNAL MEDICINE

## 2017-12-18 RX ORDER — BISACODYL 10 MG
10 SUPPOSITORY, RECTAL RECTAL DAILY PRN
Status: DISCONTINUED | OUTPATIENT
Start: 2017-12-18 | End: 2017-12-20 | Stop reason: HOSPADM

## 2017-12-18 RX ORDER — OXYCODONE HCL 20 MG/1
20 TABLET, FILM COATED, EXTENDED RELEASE ORAL NIGHTLY
Status: DISCONTINUED | OUTPATIENT
Start: 2017-12-19 | End: 2017-12-20 | Stop reason: HOSPADM

## 2017-12-18 RX ADMIN — BISACODYL 10 MG: 10 SUPPOSITORY RECTAL at 17:39

## 2017-12-18 RX ADMIN — OXYCODONE HYDROCHLORIDE AND ACETAMINOPHEN 1 TABLET: 10; 325 TABLET ORAL at 14:53

## 2017-12-18 RX ADMIN — TIMOLOL MALEATE 1 DROP: 5 SOLUTION OPHTHALMIC at 21:43

## 2017-12-18 RX ADMIN — OXYCODONE HYDROCHLORIDE AND ACETAMINOPHEN 2 TABLET: 5; 325 TABLET ORAL at 00:12

## 2017-12-18 RX ADMIN — GABAPENTIN 1200 MG: 400 CAPSULE ORAL at 21:42

## 2017-12-18 RX ADMIN — BRIMONIDINE TARTRATE OPHTHALMIC SOLUTION, 0.2% 1 DROP: 2 SOLUTION/ DROPS OPHTHALMIC at 10:52

## 2017-12-18 RX ADMIN — TIMOLOL MALEATE 1 DROP: 5 SOLUTION OPHTHALMIC at 10:45

## 2017-12-18 RX ADMIN — OXYCODONE HYDROCHLORIDE AND ACETAMINOPHEN 1 TABLET: 10; 325 TABLET ORAL at 10:46

## 2017-12-18 RX ADMIN — CEFTAZIDIME 1 G: 1 INJECTION, POWDER, FOR SOLUTION INTRAMUSCULAR; INTRAVENOUS at 06:23

## 2017-12-18 RX ADMIN — DULOXETINE 60 MG: 30 CAPSULE, DELAYED RELEASE ORAL at 10:46

## 2017-12-18 RX ADMIN — Medication 15 ML: at 10:50

## 2017-12-18 RX ADMIN — ASPIRIN 81 MG 81 MG: 81 TABLET ORAL at 10:47

## 2017-12-18 RX ADMIN — CEFTAZIDIME 1 G: 1 INJECTION, POWDER, FOR SOLUTION INTRAMUSCULAR; INTRAVENOUS at 13:54

## 2017-12-18 RX ADMIN — DORZOLAMIDE HYDROCHLORIDE 1 DROP: 20 SOLUTION/ DROPS OPHTHALMIC at 13:49

## 2017-12-18 RX ADMIN — Medication 100 MG: at 13:48

## 2017-12-18 RX ADMIN — GLYCERIN 2 G: 2.1 SUPPOSITORY RECTAL at 17:39

## 2017-12-18 RX ADMIN — LIDOCAINE: 40 CREAM TOPICAL at 21:42

## 2017-12-18 RX ADMIN — BRIMONIDINE TARTRATE OPHTHALMIC SOLUTION, 0.2% 1 DROP: 2 SOLUTION/ DROPS OPHTHALMIC at 21:35

## 2017-12-18 RX ADMIN — Medication 15 ML: at 21:40

## 2017-12-18 RX ADMIN — PANTOPRAZOLE SODIUM 40 MG: 40 TABLET, DELAYED RELEASE ORAL at 10:45

## 2017-12-18 RX ADMIN — CEFTAZIDIME 1 G: 1 INJECTION, POWDER, FOR SOLUTION INTRAMUSCULAR; INTRAVENOUS at 22:21

## 2017-12-18 RX ADMIN — LIDOCAINE: 40 CREAM TOPICAL at 10:52

## 2017-12-18 RX ADMIN — BACLOFEN 10 MG: 10 TABLET ORAL at 10:47

## 2017-12-18 RX ADMIN — MIRTAZAPINE 15 MG: 15 TABLET, FILM COATED ORAL at 21:43

## 2017-12-18 RX ADMIN — Medication 100 MG: at 10:49

## 2017-12-18 RX ADMIN — TAMSULOSIN HYDROCHLORIDE 0.4 MG: 0.4 CAPSULE ORAL at 17:40

## 2017-12-18 RX ADMIN — ATORVASTATIN CALCIUM 10 MG: 10 TABLET, FILM COATED ORAL at 21:34

## 2017-12-18 RX ADMIN — DORZOLAMIDE HYDROCHLORIDE 1 DROP: 20 SOLUTION/ DROPS OPHTHALMIC at 21:41

## 2017-12-18 RX ADMIN — GABAPENTIN 1200 MG: 400 CAPSULE ORAL at 10:44

## 2017-12-18 RX ADMIN — BRIMONIDINE TARTRATE OPHTHALMIC SOLUTION, 0.2% 1 DROP: 2 SOLUTION/ DROPS OPHTHALMIC at 15:38

## 2017-12-18 RX ADMIN — OXYCODONE HYDROCHLORIDE AND ACETAMINOPHEN 1 TABLET: 10; 325 TABLET ORAL at 19:32

## 2017-12-18 RX ADMIN — SENNOSIDES AND DOCUSATE SODIUM 1 TABLET: 8.6; 5 TABLET ORAL at 10:54

## 2017-12-18 RX ADMIN — DOCUSATE SODIUM 100 MG: 100 CAPSULE, LIQUID FILLED ORAL at 13:58

## 2017-12-18 RX ADMIN — BACLOFEN 10 MG: 10 TABLET ORAL at 21:35

## 2017-12-18 RX ADMIN — DOCUSATE SODIUM 100 MG: 100 CAPSULE, LIQUID FILLED ORAL at 21:40

## 2017-12-18 ASSESSMENT — PAIN SCALES - GENERAL
PAINLEVEL_OUTOF10: 0
PAINLEVEL_OUTOF10: 9
PAINLEVEL_OUTOF10: 4
PAINLEVEL_OUTOF10: 0
PAINLEVEL_OUTOF10: 8
PAINLEVEL_OUTOF10: 9
PAINLEVEL_OUTOF10: 9

## 2017-12-18 ASSESSMENT — PAIN DESCRIPTION - LOCATION: LOCATION: BUTTOCKS;ARM

## 2017-12-18 ASSESSMENT — PAIN DESCRIPTION - ORIENTATION: ORIENTATION: RIGHT

## 2017-12-18 NOTE — PROGRESS NOTES
drop Both Eyes TID    dorzolamide  1 drop Both Eyes BID    DULoxetine  60 mg Oral Daily    gabapentin  1,200 mg Oral BID    mirtazapine  15 mg Oral Nightly    pantoprazole  40 mg Oral Daily    timolol  1 drop Both Eyes BID     Continuous Infusions:     CBC:   No results for input(s): WBC, HGB, PLT in the last 72 hours. CMP:  No results for input(s): NA, K, CL, CO2, BUN, CREATININE, GLUCOSE, CALCIUM, LABGLOM in the last 72 hours. Objective:   Vitals: /83   Pulse 66   Temp 97 °F (36.1 °C) (Oral)   Resp 16   Ht 6' 1\" (1.854 m)   Wt 205 lb (93 kg)   SpO2 94%   BMI 27.05 kg/m²    Wt Readings from Last 3 Encounters:   12/06/17 205 lb (93 kg)   12/06/17 205 lb (93 kg)   11/15/17 205 lb (93 kg)      24HR INTAKE/OUTPUT:    Intake/Output Summary (Last 24 hours) at 12/18/17 1346  Last data filed at 12/18/17 0945   Gross per 24 hour   Intake                0 ml   Output             1950 ml   Net            -1950 ml        Urine Culture   Lab Results   Component Value Date    LABURIN No growth 24 hours 12/14/2017       General appearance: alert, appears stated age, cooperative and no distress  Lungs: clear to auscultation bilaterally  Heart: regular rate and rhythm, S1, S2 normal, no murmur, click, rub or gallop  Abdomen: soft, non-tender. Bowel sounds normal. No masses,  no organomegaly. no bladder distension noted  Extremities: extremities normal, atraumatic, no cyanosis or edema  Genitourinary:  Nieto catheter in place        Electronically signed by EDWARD Colunga on 12/18/2017 at 1:46 PM

## 2017-12-18 NOTE — PROGRESS NOTES
status reassessed regarding patients ability to participate in therapies and patient found to be able to participate in acute intensive comprehensive inpatient rehabilitation program including PT/OT to improve balance, ambulation, ADLs, and to improve the P/AROM. Therapeutic modifications regarding activities in therapies, place, amount of time per day and intensity of therapy made daily. In bed therapies or bedside therapies prn.   2. Bowel constipation and Bladder dysfunction-neurogenic bowel and bladder, severe pseudomonas UTI:  I prescribed Pyridium, stat UA positive, C&S positive for 75,000 CFU pseudomonas and 100,00 CFU Klebsiella usually sensitive to Cipro - use with caution at low dose due to tendon rupture, consider Infectious Disease consult. I discussed with Dr. Derek Barber, consider Flomax. Nieto catheter for moderate urinary retention, status post stat bladder scan, frequent toileting, ambulate to bathroom with assistance, post void residuals = 0cc. Check for C.difficile x1 if >2 loose stools in 24 hours, continue bowel & bladder program.  Monitor bowel and bladder function. Lactinex 2 PO every AC. MOM prn, Brown Bomb prn, Glycerin suppository prn, enema prn.  3. Severe right upper extremity pain and generalized OA pain, severe low back pain, catheter related pain: reassess pain every shift and prior to and after each therapy session, give prn Tylenol and   when necessary Percocet, modalities prn in therapy, Lidoderm, K-pad prn. Patient requests Lidocaine Gel / Lidocaine Patch daily, needs to schedule gel daily. Side to side turns. Add scheduled OxyContin - he refuses a.m. dose due to drowsiness. OARRS reviewed, regular use of Neurontin and Ultram, occasional use of Percocet, gets medications from Dr. Sara Blanco and Dr. Tyler Cullen.    4. Sacral scabs, Skin healing right heel decubitus and breakdown risk: I prescribed ET Mix, egg crate mattress, right heel Prevalon, bed extender, continue pressure relief program.  Daily skin exams and reports from nursing. 5. Fatigue due to nutritional and hydration deficiency:  continue to monitor I&Os, calorie counts prn, dietary consult prn. Add vitamin D and CoQ10  6. Acute episodic insomnia with situational adjustment disorder:  prn Ambien, monitor for day time sedation. 7. Falls risk elevated:  patient to use call light to get nursing assistance to get up, bed and chair alarm. 8. Elevated DVT risk: progressive activities in PT, continue prophylaxis ARELI hose, elevation and pneumatic compression stockings . 9. Complex discharge planning:  Discharge date is set for 12/20/17 to home alone with home health PT, OT, ST, RN, aide and . Patient and family education is in progress. The patient is to follow-up with their family physician after discharge. Complex Active General Medical Issues that complicate care Assess & Plan:    1. Triceps tendon rupture, right, initial encounter s/p repair-nonweightbearing right upper extremity no weightbearing and no range of motion to the right elbow, okay to range the right hand, follow-up with orthopedics physician Dr. Pandya People, non-weight bearing RUE-no active shoulder extension past neutral.  Okay for ROM right shoulder except no A/P ROM of right UE past midline, no shoulder flexion past 90 degrees. 2. Active chronic  Paraplegia-continue to work on strengthening left upper extremity, First transfer training including slide board. 3.   Asthma - Pulse oximeter checks, monitor vital signs, oxygen prn.   4.   Hypertension - continue blood pressure checks, adjust/add medications (Lipitor). 5.   Legally blind due to glaucoma - add visual aids and glaucoma eyedrops including Trusopt and Alphagan. 6. MRSA infection possibly within last 3 months-decolonization procedures initiated and I will search previous databases--I currently do not see any active indication of recent MRSA.    7. Acute on chronic cognitive deficits

## 2017-12-18 NOTE — PLAN OF CARE
Problem: Pain:  Goal: Pain level will decrease  Pain level will decrease   Outcome: Ongoing  Continue plan of care

## 2017-12-19 PROCEDURE — 6360000002 HC RX W HCPCS: Performed by: INTERNAL MEDICINE

## 2017-12-19 PROCEDURE — 6370000000 HC RX 637 (ALT 250 FOR IP): Performed by: NURSE PRACTITIONER

## 2017-12-19 PROCEDURE — 51702 INSERT TEMP BLADDER CATH: CPT

## 2017-12-19 PROCEDURE — 99232 SBSQ HOSP IP/OBS MODERATE 35: CPT | Performed by: PHYSICAL MEDICINE & REHABILITATION

## 2017-12-19 PROCEDURE — 1180000000 HC REHAB R&B

## 2017-12-19 PROCEDURE — 97110 THERAPEUTIC EXERCISES: CPT

## 2017-12-19 PROCEDURE — 97112 NEUROMUSCULAR REEDUCATION: CPT

## 2017-12-19 PROCEDURE — 2580000003 HC RX 258: Performed by: INTERNAL MEDICINE

## 2017-12-19 PROCEDURE — 97542 WHEELCHAIR MNGMENT TRAINING: CPT

## 2017-12-19 PROCEDURE — 6370000000 HC RX 637 (ALT 250 FOR IP): Performed by: PHYSICAL MEDICINE & REHABILITATION

## 2017-12-19 PROCEDURE — 97535 SELF CARE MNGMENT TRAINING: CPT

## 2017-12-19 PROCEDURE — 51798 US URINE CAPACITY MEASURE: CPT

## 2017-12-19 PROCEDURE — 6370000000 HC RX 637 (ALT 250 FOR IP): Performed by: UROLOGY

## 2017-12-19 PROCEDURE — 97116 GAIT TRAINING THERAPY: CPT

## 2017-12-19 PROCEDURE — 97532 HC COGNITIVE THERAPY 15 MIN: CPT

## 2017-12-19 RX ADMIN — OXYCODONE HYDROCHLORIDE AND ACETAMINOPHEN 1 TABLET: 10; 325 TABLET ORAL at 12:21

## 2017-12-19 RX ADMIN — BRIMONIDINE TARTRATE OPHTHALMIC SOLUTION, 0.2% 1 DROP: 2 SOLUTION/ DROPS OPHTHALMIC at 20:17

## 2017-12-19 RX ADMIN — LIDOCAINE: 40 CREAM TOPICAL at 09:00

## 2017-12-19 RX ADMIN — BACLOFEN 10 MG: 10 TABLET ORAL at 07:22

## 2017-12-19 RX ADMIN — OXYCODONE HYDROCHLORIDE AND ACETAMINOPHEN 1 TABLET: 10; 325 TABLET ORAL at 16:40

## 2017-12-19 RX ADMIN — SENNOSIDES AND DOCUSATE SODIUM 1 TABLET: 8.6; 5 TABLET ORAL at 07:23

## 2017-12-19 RX ADMIN — CHLORHEXIDINE GLUCONATE 15 ML: 213 SOLUTION TOPICAL at 07:21

## 2017-12-19 RX ADMIN — DULOXETINE 60 MG: 30 CAPSULE, DELAYED RELEASE ORAL at 07:22

## 2017-12-19 RX ADMIN — BRIMONIDINE TARTRATE OPHTHALMIC SOLUTION, 0.2% 1 DROP: 2 SOLUTION/ DROPS OPHTHALMIC at 12:21

## 2017-12-19 RX ADMIN — OXYCODONE HYDROCHLORIDE AND ACETAMINOPHEN 1 TABLET: 10; 325 TABLET ORAL at 07:23

## 2017-12-19 RX ADMIN — BACLOFEN 10 MG: 10 TABLET ORAL at 20:17

## 2017-12-19 RX ADMIN — DOCUSATE SODIUM 100 MG: 100 CAPSULE, LIQUID FILLED ORAL at 07:27

## 2017-12-19 RX ADMIN — ATORVASTATIN CALCIUM 10 MG: 10 TABLET, FILM COATED ORAL at 20:17

## 2017-12-19 RX ADMIN — CEFTAZIDIME 1 G: 1 INJECTION, POWDER, FOR SOLUTION INTRAMUSCULAR; INTRAVENOUS at 05:30

## 2017-12-19 RX ADMIN — Medication 100 MG: at 12:20

## 2017-12-19 RX ADMIN — DORZOLAMIDE HYDROCHLORIDE 1 DROP: 20 SOLUTION/ DROPS OPHTHALMIC at 07:20

## 2017-12-19 RX ADMIN — LIDOCAINE: 40 CREAM TOPICAL at 20:22

## 2017-12-19 RX ADMIN — BRIMONIDINE TARTRATE OPHTHALMIC SOLUTION, 0.2% 1 DROP: 2 SOLUTION/ DROPS OPHTHALMIC at 07:31

## 2017-12-19 RX ADMIN — Medication 15 ML: at 07:28

## 2017-12-19 RX ADMIN — DORZOLAMIDE HYDROCHLORIDE 1 DROP: 20 SOLUTION/ DROPS OPHTHALMIC at 20:17

## 2017-12-19 RX ADMIN — PANTOPRAZOLE SODIUM 40 MG: 40 TABLET, DELAYED RELEASE ORAL at 07:22

## 2017-12-19 RX ADMIN — Medication 15 ML: at 20:17

## 2017-12-19 RX ADMIN — MIRTAZAPINE 15 MG: 15 TABLET, FILM COATED ORAL at 20:17

## 2017-12-19 RX ADMIN — GABAPENTIN 1200 MG: 400 CAPSULE ORAL at 07:21

## 2017-12-19 RX ADMIN — OXYCODONE HYDROCHLORIDE 20 MG: 20 TABLET, FILM COATED, EXTENDED RELEASE ORAL at 20:17

## 2017-12-19 RX ADMIN — TIMOLOL MALEATE 1 DROP: 5 SOLUTION OPHTHALMIC at 07:25

## 2017-12-19 RX ADMIN — GABAPENTIN 1200 MG: 400 CAPSULE ORAL at 20:17

## 2017-12-19 RX ADMIN — OXYCODONE HYDROCHLORIDE AND ACETAMINOPHEN 1 TABLET: 5; 325 TABLET ORAL at 01:31

## 2017-12-19 RX ADMIN — ASPIRIN 81 MG 81 MG: 81 TABLET ORAL at 07:23

## 2017-12-19 RX ADMIN — DOCUSATE SODIUM 100 MG: 100 CAPSULE, LIQUID FILLED ORAL at 20:17

## 2017-12-19 RX ADMIN — Medication 100 MG: at 07:27

## 2017-12-19 RX ADMIN — TIMOLOL MALEATE 1 DROP: 5 SOLUTION OPHTHALMIC at 20:17

## 2017-12-19 RX ADMIN — TAMSULOSIN HYDROCHLORIDE 0.4 MG: 0.4 CAPSULE ORAL at 16:40

## 2017-12-19 ASSESSMENT — PAIN SCALES - GENERAL
PAINLEVEL_OUTOF10: 0
PAINLEVEL_OUTOF10: 6
PAINLEVEL_OUTOF10: 9
PAINLEVEL_OUTOF10: 8
PAINLEVEL_OUTOF10: 4
PAINLEVEL_OUTOF10: 7
PAINLEVEL_OUTOF10: 0

## 2017-12-19 ASSESSMENT — PAIN DESCRIPTION - DESCRIPTORS: DESCRIPTORS: THROBBING

## 2017-12-19 NOTE — PROGRESS NOTES
Occupational Therapy    Occupational Therapy Discharge Report  Date: 2017    Patient Name: Jess Hurt  MRN: [id number]     : 1960      ADL  Feeding: Setup  Grooming: Modified independent  (not including shaving)  UE Bathing: Stand by assistance  LE Bathing: Stand by assistance  UE Dressing: Setup  LE Dressing: Dependent/Total  Toileting: Maximum assistance    Overall Orientation Status: Within Functional Limits         Tone RUE  RUE Tone: Normotonic  Tone LUE  LUE Tone: Normotonic  Coordination  Movements Are Fluid And Coordinated: No  Coordination and Movement description: Fine motor impairments;Gross motor impairments         Transfers  Sit Pivot Transfers: Minimal assistance         Balance  Sitting Balance: Stand by assistance  Standing Balance: Dependent/Total  Functional Mobility  Functional - Mobility Device: Wheelchair  Activity: To/from bathroom  Assist Level: Stand by assistance  Toilet Transfers  Toilet - Technique: Sit pivot  Equipment Used: Grab bars  Toilet Transfer: Minimal assistance  Tub Transfers  Tub Transfers: Not tested  Shower Transfers  Shower Transfers: Minimal assistance    Vision  Vision: Impaired  Vision Exceptions: Legally blind;Cataracts  Hearing  Hearing: Exceptions to [facility]  Hearing Exceptions: Hard of hearing/hearing concerns; No hearing aid           LUE AROM : WNL  Left Hand AROM: WNL  RUE General AROM: NT due to ROM restricitions   Right Hand AROM: WFL    LUE Strength  Gross LUE Strength: WFL  RUE Strength  RUE Strength Comment: NT         Hand Dominance  Hand Dominance:  (both)    Assessment   OT D/C RECOMMENDATIONS  REQUIRES OT FOLLOW UP: Yes  Type: Home with Assist;Home Health Care;Home OT    Social/Functional History  Lives With: Alone  Type of Home: Assisted living  Home Layout: One level  Home Access: Elevator  Bathroom Shower/Tub: Tub/Shower unit  Bathroom Toilet: Standard  Bathroom Equipment: Grab bars in shower;Commode;Grab bars around toilet; Tub transfer bench;3-in-1 commode  Bathroom Accessibility: Wheelchair accessible  Home Equipment: Wheelchair-manual;Wheelchair-electric;Hospital bed  Receives Help From: Home health;Friend(s); Family  ADL Assistance: Needs assistance  Homemaking Assistance: Needs assistance  Homemaking Responsibilities: No  Transfer Assistance: Needs assistance  Active : No  Type of occupation: played music, . Played keyboard and bass guitar   Leisure & Hobbies: Used to play music but now he can't hold his guitar. Still has a keyboard. IADL Comments: HHA comes for an 1.5 in morning (makes breakfast), 3x a day total. Pt has cat and takes care of litter box on the floor). Meals are delivered to him (Mom's meals), HHA makes meals and does grocery shopping. Pt does not use stove. HHA does laundry and cleaning, vacumming. Pt reported he sturggles with dialing the phone but has family/friends/HHA on speed dial.   Additional Comments: Hx of paraplegia and legally blind and [place]. Pt applied for section 8 housing. Friends visit from [facility], if anything breaks in his apartment his friend fixes. Pt concerned that if he is gone away from his apartment for >4weeks that he will lose his [facility].      Arousal/Alertness: Appropriate responses to stimuli  Memory: Decreased recall of precautions  Safety Judgement: Decreased awareness of need for safety;Decreased awareness of need for assistance  Problem Solving: Assistance required to generate solutions;Assistance required to implement solutions;Decreased awareness of errors  Insights: Decreased awareness of deficits  Initiation: Does not require cues  Sequencing: Does not require cues              Jorge Bonner      Electronically signed by SILVANA Rose on 12/19/2017 at 4:34 PM

## 2017-12-19 NOTE — PROGRESS NOTES
UTI growing Pseudomonas Klebsiella        No fevers chills  No nausea vomiting diarrhea no chest pain evidence  Tolerating ceftazidime without rash             /83   Pulse 66   Temp 97 °F (36.1 °C) (Oral)   Resp 16   Ht 6' 1\" (1.854 m)   Wt 205 lb (93 kg)   SpO2 94%   BMI 27.05 kg/m²     Pulmonary/Chest: No respiratory distress. He has no wheezes. He has no rales. He exhibits no tenderness. Abdominal: Soft. He exhibits no distension and no mass. There is no tenderness. There is no rebound and no guarding. Genitourinary: Penis normal. No penile tenderness. Musculoskeletal: Normal range of motion. He exhibits no edema or tenderness.            Patient Active Problem List   Diagnosis    Triceps tendon rupture, right, initial encounter    Disorder of muscle, ligament, and fascia    Lack of coordination    Paraplegia ([address]    Abnormality of gait and mobility w/paraplegia due to Rt triceps tendon rupture s/p repair, [facility] admit 12/06/17    Asthma    Hypertension    Legally blind    MRSA infection within last 3 months    Hard of hearing    Cognitive deficits    Chronic bilateral low back pain with bilateral sciatica    Claudication ([address]         ASSESSMENT:  PLAN:     UTI growing Pseudomonas Klebsiella sensitivities pending  Ceftazidime to dc tomorrow     Repeat UA C&S  Much less inflammation    Culture negative

## 2017-12-19 NOTE — PROGRESS NOTES
Left: Stand by assistance  Supine to Sit: Stand by assistance  Sit to Supine: Stand by assistance  Scooting: Moderate assistance  Transfers  Sit Pivot Transfers: Minimal assistance   Assessment      Activity Tolerance  Activity Tolerance: reported fatiguing during adl  Safety Devices  Safety Devices in place: Yes  Type of devices: All fall risk precautions in place        Plan   D/C OT. Pt is expected to d/c to home Wed. 12/20/2017.    Therapy Time   Individual Concurrent Group Co-treatment   Time In 0830         Time Out 1000         Minutes [address] [place] [address]    Electronically signed by SILVANA [place] on 12/19/2017 at 4:17 PM

## 2017-12-19 NOTE — PROGRESS NOTES
[facility]  Speech Language/Pathology  Rehab Daily Note                  Eder Phoebe  12/19/2017    Rehab Dx/Hx: Abnormality of gait and mobility [R26.9]    Precautions: falls    ST Dx: [] Aphasia  [] Dysarthria  [] Apraxia   [] Oropharyngeal dysphagia              [x] Cognitive Impairment  [] Other:     Date of Admit: 12/6/2017  Room #: L673/B137-74    Time in: 1430   Time out: 1500    Subjective:  Behavior: [x] Alert  [x] Cooperative  [x]  Pleasant  [] Confused  [] Agitated                                          [] Uncooperative  [] Distractible [] Motivated  [] Self-Limiting [] Anxious          [] Other:    Endurance:  [x] Adequate for participation in SLP sessions  [] Reduced overall              [] Lethargic  [] Other:              Interventions used this date:  [] Speech Treatment       [] Expressive Language  [] Receptive Language   [] Dysphagia Treatment   [x] Cognitive Skill Brendan  [] Oral Motor  [] Voice Treatment       []  AAC     [] ESTIM  [] Speech production       [] Therapeutic Meal Monitor               [] Instruction in compensatory strategies     Objective/Assessment:   Discussed return home with good insight to deficits, and good understanding of his reliance on his care taker, Aaron Justin but poor-fair safety judgment and insight in regards to transfers. ST educated pt on safety. Pt was noted to have difficulty with thought organization and word finding, often saying \"all that stuff\" or being very vague in his conversation. For example, when asked to sequence the steps for a proper sliding board transfer, he said, \"you know, get the board and move and all that stuff\". Pt required simplification and repetition of questions. For example, when SLP proposed hypothetical questions, he often answered as if the SLP was literally referring to his personal life. E.g.- \"Imagine you have to take a pain medication every 6 hours.  If your first dose was at 9:00 am, what time

## 2017-12-19 NOTE — HOME CARE
Received call from Emre Garcia at [facility] she requested I fax an encounter by Dr. Christiano Redding to them. Sent note from 12/19. The [address] [place] [address] office will be handling this and fax is [phone].

## 2017-12-19 NOTE — HOME CARE
Verified with Bryce Haddad at [facility] that they received information regarding patient pending discharge 12/20.

## 2017-12-19 NOTE — PROGRESS NOTES
0 - Activity does not occur  Shower Transfer: 0 - Activity does not occur,  ,      Speech therapy: FIMS: Comprehension: 6 - Complex ideas 90% or device (hearing aid/glasses)  Expression: 6 - Device used to express complex ideas/needs  Social Interaction: 6 - Patient requires medication for mood and/or effect  Problem Solvin - Patient solves simple/routine tasks 75-90%+   Memory: 1 - Patient remembers < 25% of the time      Lab/X-ray studies reviewed, analyzed and discussed with patient and staff:   No results found for this or any previous visit (from the past 24 hour(s)). Fluoro For Surgical Procedures  Result Date: 2017  X-RAY DOSE SUMMARY: 1 FLUOROSCOPIC IMAGES SAVED TO PACS. 0SPOT FILM WAS EXPOSED. 2SECOND FLUOROSCOPY TIME. 1.4MGY CUMULATIVE X-RAY DOSE. CONCLUSION: SINGLE INTRAOPERATIVE IMAGE SAVED       Previous extensive, complex labs, notes and diagnostics reviewed and analyzed. ALLERGIES:    Allergies as of 2017 - Review Complete 2017   Allergen Reaction Noted    Pcn [penicillins] Itching 2017      (please also verify by checking MAR)    Yesterday I evaluated this patient for periodic reassessment of medical and functional status. The patient was discussed in detail at the treatment team meeting focusing on current medical issues, progress in therapies, social issues, psychological issues, barriers to progress and strategies to address these barriers, and discharge planning. See the hand written addendum to rehab progress note. The patient continues to be high risk for future disability and their medical and rehabilitation prognosis continue to be good and therefore, we will continue the patient's rehabilitation course as planned. The patient's tentative discharge date was set. Patient and family education was discussed. The patient was made aware of the team discussion regarding their progress.   Discharge plans were discussed along with barriers to progress and

## 2017-12-19 NOTE — PROGRESS NOTES
Physical Therapy Rehab Treatment Note  [facility]Department[facility]  Room: S902/P198-53       NAME: Alyssa Kelley  : 1960 (56 y.o.)  MRN: [id number]  CODE STATUS: Full Code    Date of Service: 2017       Restrictions:  Restrictions/Precautions: Weight Bearing, ROM Restrictions, Fall Risk, General Precautions  Upper Extremity Weight Bearing Restrictions  Other:  (NWB R UE; no R shoulder flexion past 90degrees; no R shoulder ROM past midline; No R shoulder ext past neutral)  Body mass index is 27.05 kg/m². SUBJECTIVE: Subjective: \"I just woke up. I was having stomach problems earlier\"  Pain Screening  Patient Currently in Pain: No       Post Treatment Pain Screenin/10  Pain Assessment  Pain Assessment: 0-10  Pain Level: 6  Pain Location:  (all over)  Pain Descriptors: Throbbing    OBJECTIVE:     Bed mobility  Rolling to Left: Stand by assistance  Rolling to Right: Stand by assistance  Supine to Sit: Stand by assistance  Sit to Supine: Stand by assistance    Transfers  Bed to Chair: Moderate assistance (slide board)  Car Transfer: Maximum Assistance (with sl board)     Propulsion 1  Propulsion: Manual (has power chair at home but not available in rehab.  )  Level: Carpet  Method: LUE (Pt only able to utilize L UE to propel  )  Level of Assistance: Moderate assistance  Description/ Details: Assist needed to maintain straight path. Secondary to pt only able to propel with 1 UE. Distance: 200ft       Activity Tolerance  Activity Tolerance: Patient Tolerated treatment well;Patient limited by cognitive status    Exercises  Core Strengthening: Seated MRE to core in various directions. Reviewed ROM and WBing restrictions. Pt able to state but requires cues to follow with functional activities. Other exercises   TA isometrics x20   supine crunches 2x10     ASSESSMENT/COMMENTS:  Assessment: Pt cont to require assist for transfers and requires frequent VCs to not activily use R UE.       PLAN OF

## 2017-12-20 VITALS
HEART RATE: 71 BPM | HEIGHT: 73 IN | TEMPERATURE: 97 F | WEIGHT: 205.03 LBS | SYSTOLIC BLOOD PRESSURE: 139 MMHG | DIASTOLIC BLOOD PRESSURE: 85 MMHG | OXYGEN SATURATION: 94 % | BODY MASS INDEX: 27.17 KG/M2 | RESPIRATION RATE: 16 BRPM

## 2017-12-20 PROCEDURE — 99239 HOSP IP/OBS DSCHRG MGMT >30: CPT | Performed by: PHYSICAL MEDICINE & REHABILITATION

## 2017-12-20 PROCEDURE — 6370000000 HC RX 637 (ALT 250 FOR IP): Performed by: UROLOGY

## 2017-12-20 PROCEDURE — 6370000000 HC RX 637 (ALT 250 FOR IP): Performed by: NURSE PRACTITIONER

## 2017-12-20 PROCEDURE — 6370000000 HC RX 637 (ALT 250 FOR IP): Performed by: PHYSICAL MEDICINE & REHABILITATION

## 2017-12-20 RX ORDER — ASPIRIN 81 MG/1
81 TABLET, CHEWABLE ORAL DAILY
Qty: 30 TABLET | Refills: 3 | Status: SHIPPED | OUTPATIENT
Start: 2017-12-20

## 2017-12-20 RX ORDER — TIMOLOL MALEATE 5 MG/ML
1 SOLUTION/ DROPS OPHTHALMIC 2 TIMES DAILY
Qty: 1 BOTTLE | Refills: 0 | Status: SHIPPED | OUTPATIENT
Start: 2017-12-20 | End: 2017-12-20 | Stop reason: HOSPADM

## 2017-12-20 RX ORDER — TAMSULOSIN HYDROCHLORIDE 0.4 MG/1
0.4 CAPSULE ORAL DAILY
Qty: 30 CAPSULE | Refills: 3 | Status: SHIPPED | OUTPATIENT
Start: 2017-12-20

## 2017-12-20 RX ORDER — BISACODYL 10 MG
10 SUPPOSITORY, RECTAL RECTAL DAILY PRN
Qty: 30 SUPPOSITORY | Refills: 1 | Status: SHIPPED | OUTPATIENT
Start: 2017-12-20 | End: 2018-01-19

## 2017-12-20 RX ADMIN — PANTOPRAZOLE SODIUM 40 MG: 40 TABLET, DELAYED RELEASE ORAL at 10:32

## 2017-12-20 RX ADMIN — OXYCODONE HYDROCHLORIDE AND ACETAMINOPHEN 1 TABLET: 10; 325 TABLET ORAL at 10:31

## 2017-12-20 RX ADMIN — DULOXETINE 60 MG: 30 CAPSULE, DELAYED RELEASE ORAL at 10:30

## 2017-12-20 RX ADMIN — GABAPENTIN 1200 MG: 400 CAPSULE ORAL at 10:30

## 2017-12-20 RX ADMIN — ASPIRIN 81 MG 81 MG: 81 TABLET ORAL at 10:32

## 2017-12-20 RX ADMIN — SENNOSIDES AND DOCUSATE SODIUM 1 TABLET: 8.6; 5 TABLET ORAL at 10:31

## 2017-12-20 RX ADMIN — OXYCODONE HYDROCHLORIDE AND ACETAMINOPHEN 2 TABLET: 5; 325 TABLET ORAL at 17:33

## 2017-12-20 RX ADMIN — TAMSULOSIN HYDROCHLORIDE 0.4 MG: 0.4 CAPSULE ORAL at 16:00

## 2017-12-20 RX ADMIN — DORZOLAMIDE HYDROCHLORIDE 1 DROP: 20 SOLUTION/ DROPS OPHTHALMIC at 10:32

## 2017-12-20 RX ADMIN — TIMOLOL MALEATE 1 DROP: 5 SOLUTION OPHTHALMIC at 10:32

## 2017-12-20 RX ADMIN — CHLORHEXIDINE GLUCONATE 15 ML: 213 SOLUTION TOPICAL at 10:31

## 2017-12-20 RX ADMIN — DOCUSATE SODIUM 100 MG: 100 CAPSULE, LIQUID FILLED ORAL at 10:31

## 2017-12-20 RX ADMIN — Medication 100 MG: at 10:32

## 2017-12-20 RX ADMIN — BACLOFEN 10 MG: 10 TABLET ORAL at 10:31

## 2017-12-20 RX ADMIN — Medication 100 MG: at 13:20

## 2017-12-20 RX ADMIN — BRIMONIDINE TARTRATE OPHTHALMIC SOLUTION, 0.2% 1 DROP: 2 SOLUTION/ DROPS OPHTHALMIC at 10:32

## 2017-12-20 RX ADMIN — OXYCODONE HYDROCHLORIDE AND ACETAMINOPHEN 1 TABLET: 10; 325 TABLET ORAL at 14:39

## 2017-12-20 ASSESSMENT — PAIN SCALES - GENERAL
PAINLEVEL_OUTOF10: 8
PAINLEVEL_OUTOF10: 7
PAINLEVEL_OUTOF10: 8

## 2017-12-20 NOTE — FLOWSHEET NOTE
Patient will be discharged home with his eduardo catheter. Education provided regarding keeping the area clean and his follow up appointment with urologist . Patient expressed understanding.

## 2017-12-20 NOTE — CARE COORDINATION
Facility/Department: [facility] CASE MANAGEMENT    NAME: Tino Charles      : 1960  MRN: [id number]  [id number]      Social/Functional History  Social/Functional History  Lives With: Alone  Type of Home: Assisted living (4th level. Has only lived at apartment for 4 months. )  Home Layout: One level  Home Access: Elevator  Bathroom Shower/Tub: Tub/Shower unit  Bathroom Toilet: Standard (really low, joanne was supposed to put in a raised toilet but has not yet. )  Bathroom Equipment: Grab bars in shower, Commode, Grab bars around toilet, Tub transfer bench, 3-in-1 commode (Pt has 3-1 commode in bathroom but not over toilet, arms are taped on, needs new one. )  Bathroom Accessibility: Wheelchair accessible  Home Equipment: [employer], [address] bed [facility] bed in old (steel railings only on 1 side however HOB raises). Power assist wheelchair; transport chair)  Receives Help From: Home health, Friend(s), Family (aid 3x a day, aid takes to doctor appointments. Sister in [place] but pt does not see. Son in [facility] and dtr in [place]. Pt has 8 grand kids. States that his aide will be available to assist him more often upon DC. )  ADL Assistance: Needs assistance (HHA assists for bathing, dressing. Back, and legs HHA washes. Assists with UED and LED. Easier to dress in bed. )  Homemaking Assistance: Needs assistance (HHA helps with )  Ambulation Assistance:  (Independent with w/c mobility )  Active : No  Type of occupation: played music, . Played keyboard and bass guitar   Leisure & Hobbies: Used to play music but now he can't hold his guitar. Still has a keyboard. IADL Comments: HHA comes for an 1.5 in morning (makes breakfast), 3x a day total. Pt has cat and takes care of litter box on the floor). Meals are delivered to him (Mom's meals), HHA makes meals and does grocery shopping. Pt does not use stove. HHA does laundry and cleaning, vacumming.   Pt
met his goals. Pt has an appointment for tomorrow with Dr. Maxime Weber per [facility]akta,  they will set up everything. All orders faxed to Clement Bhakta along with the Discharge Summary.     Electronically signed by Zaria Christensen RN on 12/20/17 at 4:02 PM
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
## 2017-12-28 ENCOUNTER — TELEPHONE (OUTPATIENT)
Dept: UROLOGY | Age: 57
End: 2017-12-28

## 2017-12-28 NOTE — TELEPHONE ENCOUNTER
Pt's Razia Hoffman nurse called asking for orders regarding discontinuing eduardo catheter post hospital discharge. Faxed 12/18/2016 hospital urology progress note for discontinuing eduardo catheter instructions to Romel MERA [facility] [phone], confirmation received for fax. Will remove eduardo today, use condom catheter and will call office if any problems w/ TTV.

## 2021-06-28 ENCOUNTER — OFFICE VISIT (OUTPATIENT)
Dept: GASTROENTEROLOGY | Age: 61
End: 2021-06-28
Payer: MEDICAID

## 2021-06-28 VITALS
RESPIRATION RATE: 18 BRPM | OXYGEN SATURATION: 94 % | BODY MASS INDEX: 23.46 KG/M2 | HEART RATE: 71 BPM | WEIGHT: 177 LBS | HEIGHT: 73 IN

## 2021-06-28 DIAGNOSIS — K62.5 RECTAL BLEEDING: Primary | ICD-10-CM

## 2021-06-28 PROCEDURE — 99204 OFFICE O/P NEW MOD 45 MIN: CPT | Performed by: SPECIALIST

## 2021-06-28 RX ORDER — SODIUM, POTASSIUM,MAG SULFATES 17.5-3.13G
SOLUTION, RECONSTITUTED, ORAL ORAL
Qty: 1 BOTTLE | Refills: 0 | Status: SHIPPED | OUTPATIENT
Start: 2021-06-28 | End: 2021-06-29

## 2021-06-28 ASSESSMENT — ENCOUNTER SYMPTOMS
ABDOMINAL PAIN: 0
DIARRHEA: 0
ANAL BLEEDING: 0
NAUSEA: 0
RESPIRATORY NEGATIVE: 1
EYES NEGATIVE: 1
BLOOD IN STOOL: 1
VOMITING: 0
ABDOMINAL DISTENTION: 0
CONSTIPATION: 0
RECTAL PAIN: 0
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
## 2021-06-29 ENCOUNTER — OFFICE VISIT (OUTPATIENT)
Dept: PAIN MANAGEMENT | Age: 61
End: 2021-06-29
Payer: MEDICAID

## 2021-06-29 VITALS — TEMPERATURE: 96.7 F | BODY MASS INDEX: 23.46 KG/M2 | WEIGHT: 177 LBS | HEIGHT: 73 IN

## 2021-06-29 DIAGNOSIS — M54.42 CHRONIC BILATERAL LOW BACK PAIN WITH BILATERAL SCIATICA: ICD-10-CM

## 2021-06-29 DIAGNOSIS — M54.41 CHRONIC BILATERAL LOW BACK PAIN WITH BILATERAL SCIATICA: ICD-10-CM

## 2021-06-29 DIAGNOSIS — G89.29 CHRONIC BILATERAL LOW BACK PAIN WITH BILATERAL SCIATICA: ICD-10-CM

## 2021-06-29 DIAGNOSIS — G89.4 CHRONIC PAIN SYNDROME: ICD-10-CM

## 2021-06-29 DIAGNOSIS — G82.20 PARAPLEGIA (HCC): ICD-10-CM

## 2021-06-29 PROCEDURE — 99213 OFFICE O/P EST LOW 20 MIN: CPT | Performed by: NURSE PRACTITIONER

## 2021-06-29 RX ORDER — POLYETHYLENE GLYCOL 3350, SODIUM CHLORIDE, SODIUM BICARBONATE, POTASSIUM CHLORIDE 420; 11.2; 5.72; 1.48 G/4L; G/4L; G/4L; G/4L
4000 POWDER, FOR SOLUTION ORAL ONCE
Qty: 4000 ML | Refills: 0 | Status: SHIPPED | OUTPATIENT
Start: 2021-06-29 | End: 2021-06-29

## 2021-06-29 RX ORDER — GABAPENTIN 600 MG/1
600 TABLET ORAL 2 TIMES DAILY
Qty: 60 TABLET | Refills: 0 | Status: SHIPPED | OUTPATIENT
Start: 2021-06-29 | End: 2021-08-24 | Stop reason: SDUPTHER

## 2021-06-29 ASSESSMENT — ENCOUNTER SYMPTOMS
COUGH: 0
SHORTNESS OF BREATH: 0
DIARRHEA: 0
BLOOD IN STOOL: 1
NAUSEA: 0
CONSTIPATION: 0
BACK PAIN: 1
EYES NEGATIVE: 1
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
## 2021-07-22 ENCOUNTER — ANESTHESIA EVENT (OUTPATIENT)
Dept: ENDOSCOPY | Age: 61
End: 2021-07-22
Payer: MEDICAID

## 2021-07-22 ENCOUNTER — ANCILLARY PROCEDURE (OUTPATIENT)
Dept: ENDOSCOPY | Age: 61
End: 2021-07-22
Attending: SPECIALIST
Payer: MEDICAID

## 2021-07-22 ENCOUNTER — HOSPITAL ENCOUNTER (OUTPATIENT)
Age: 61
Setting detail: OUTPATIENT SURGERY
Discharge: HOME | End: 2021-07-22
Attending: SPECIALIST | Admitting: SPECIALIST
Payer: MEDICAID

## 2021-07-22 ENCOUNTER — ANESTHESIA (OUTPATIENT)
Dept: ENDOSCOPY | Age: 61
End: 2021-07-22
Payer: MEDICAID

## 2021-07-22 VITALS
SYSTOLIC BLOOD PRESSURE: 129 MMHG | OXYGEN SATURATION: 96 % | HEIGHT: 73 IN | RESPIRATION RATE: 16 BRPM | TEMPERATURE: 98.1 F | HEART RATE: 66 BPM | BODY MASS INDEX: 23.86 KG/M2 | DIASTOLIC BLOOD PRESSURE: 73 MMHG | WEIGHT: 180 LBS

## 2021-07-22 VITALS — OXYGEN SATURATION: 100 % | DIASTOLIC BLOOD PRESSURE: 84 MMHG | SYSTOLIC BLOOD PRESSURE: 132 MMHG

## 2021-07-22 PROCEDURE — 2709999900 HC NON-CHARGEABLE SUPPLY: Performed by: SPECIALIST

## 2021-07-22 PROCEDURE — 2500000003 HC RX 250 WO HCPCS: Performed by: NURSE ANESTHETIST, CERTIFIED REGISTERED

## 2021-07-22 PROCEDURE — 3609027000 HC COLONOSCOPY: Performed by: SPECIALIST

## 2021-07-22 PROCEDURE — 2580000003 HC RX 258: Performed by: SPECIALIST

## 2021-07-22 PROCEDURE — 6370000000 HC RX 637 (ALT 250 FOR IP): Performed by: SPECIALIST

## 2021-07-22 PROCEDURE — 88305 TISSUE EXAM BY PATHOLOGIST: CPT

## 2021-07-22 PROCEDURE — 3700000000 HC ANESTHESIA ATTENDED CARE: Performed by: SPECIALIST

## 2021-07-22 PROCEDURE — 3700000001 HC ADD 15 MINUTES (ANESTHESIA): Performed by: SPECIALIST

## 2021-07-22 PROCEDURE — 7100000010 HC PHASE II RECOVERY - FIRST 15 MIN: Performed by: SPECIALIST

## 2021-07-22 PROCEDURE — 2580000003 HC RX 258

## 2021-07-22 PROCEDURE — 45380 COLONOSCOPY AND BIOPSY: CPT | Performed by: SPECIALIST

## 2021-07-22 RX ORDER — SODIUM CHLORIDE 0.9 % (FLUSH) 0.9 %
5-40 SYRINGE (ML) INJECTION PRN
Status: DISCONTINUED | OUTPATIENT
Start: 2021-07-22 | End: 2021-07-22 | Stop reason: HOSPADM

## 2021-07-22 RX ORDER — SODIUM CHLORIDE 9 MG/ML
25 INJECTION, SOLUTION INTRAVENOUS PRN
Status: DISCONTINUED | OUTPATIENT
Start: 2021-07-22 | End: 2021-07-22 | Stop reason: HOSPADM

## 2021-07-22 RX ORDER — ONDANSETRON 2 MG/ML
4 INJECTION INTRAMUSCULAR; INTRAVENOUS
Status: DISCONTINUED | OUTPATIENT
Start: 2021-07-22 | End: 2021-07-22 | Stop reason: HOSPADM

## 2021-07-22 RX ORDER — LIDOCAINE HYDROCHLORIDE 20 MG/ML
INJECTION, SOLUTION INFILTRATION; PERINEURAL PRN
Status: DISCONTINUED | OUTPATIENT
Start: 2021-07-22 | End: 2021-07-22 | Stop reason: SDUPTHER

## 2021-07-22 RX ORDER — SODIUM CHLORIDE 9 MG/ML
INJECTION, SOLUTION INTRAVENOUS
Status: COMPLETED
Start: 2021-07-22 | End: 2021-07-22

## 2021-07-22 RX ORDER — SODIUM CHLORIDE 0.9 % (FLUSH) 0.9 %
5-40 SYRINGE (ML) INJECTION EVERY 12 HOURS SCHEDULED
Status: DISCONTINUED | OUTPATIENT
Start: 2021-07-22 | End: 2021-07-22 | Stop reason: HOSPADM

## 2021-07-22 RX ORDER — MAGNESIUM HYDROXIDE 1200 MG/15ML
LIQUID ORAL PRN
Status: DISCONTINUED | OUTPATIENT
Start: 2021-07-22 | End: 2021-07-22 | Stop reason: ALTCHOICE

## 2021-07-22 RX ORDER — PROPOFOL 10 MG/ML
INJECTION, EMULSION INTRAVENOUS CONTINUOUS PRN
Status: DISCONTINUED | OUTPATIENT
Start: 2021-07-22 | End: 2021-07-22 | Stop reason: SDUPTHER

## 2021-07-22 RX ORDER — SODIUM CHLORIDE 9 MG/ML
INJECTION, SOLUTION INTRAVENOUS CONTINUOUS
Status: DISCONTINUED | OUTPATIENT
Start: 2021-07-22 | End: 2021-07-22 | Stop reason: HOSPADM

## 2021-07-22 RX ORDER — 0.9 % SODIUM CHLORIDE 0.9 %
500 INTRAVENOUS SOLUTION INTRAVENOUS ONCE
Status: COMPLETED | OUTPATIENT
Start: 2021-07-22 | End: 2021-07-22

## 2021-07-22 RX ORDER — LIDOCAINE HYDROCHLORIDE 10 MG/ML
1 INJECTION, SOLUTION EPIDURAL; INFILTRATION; INTRACAUDAL; PERINEURAL
Status: DISCONTINUED | OUTPATIENT
Start: 2021-07-22 | End: 2021-07-22 | Stop reason: HOSPADM

## 2021-07-22 RX ADMIN — SODIUM CHLORIDE 100 ML: 9 INJECTION, SOLUTION INTRAVENOUS at 12:42

## 2021-07-22 RX ADMIN — PROPOFOL 150 MCG/KG/MIN: 10 INJECTION, EMULSION INTRAVENOUS at 12:21

## 2021-07-22 RX ADMIN — SODIUM CHLORIDE 500 ML: 9 INJECTION, SOLUTION INTRAVENOUS at 11:46

## 2021-07-22 RX ADMIN — LIDOCAINE HYDROCHLORIDE 30 MG: 20 INJECTION, SOLUTION INFILTRATION; PERINEURAL at 12:21

## 2021-07-22 ASSESSMENT — PULMONARY FUNCTION TESTS
PIF_VALUE: 0
PIF_VALUE: 1
PIF_VALUE: 0

## 2021-07-22 ASSESSMENT — PAIN - FUNCTIONAL ASSESSMENT: PAIN_FUNCTIONAL_ASSESSMENT: 0-10

## 2021-07-22 ASSESSMENT — PAIN SCALES - GENERAL: PAINLEVEL_OUTOF10: 0

## 2021-07-22 NOTE — ANESTHESIA POSTPROCEDURE EVALUATION
Department of Anesthesiology  Postprocedure Note    Patient: Sam Snider  MRN: [id number]  YOB: 1960  Date of evaluation: 7/22/2021  Time:  12:43 PM     Procedure Summary     Date: 07/22/21 Room / Location: [address] [place] [address]    Anesthesia Start: 1219 Anesthesia Stop: 1243    Procedure: COLONOSCOPY DIAGNOSTIC (N/A ) Diagnosis: (Rectal bleeding)    Surgeons: Makenna Carvalho MD Responsible Provider: CAM Howe CRNA    Anesthesia Type: general ASA Status: 3          Anesthesia Type: general    Dione Phase I:      Dione Phase II:      Last vitals: Reviewed and per EMR flowsheets.        Anesthesia Post Evaluation    Patient location during evaluation: bedside  Patient participation: complete - patient participated  Level of consciousness: awake and awake and alert  Airway patency: patent  Nausea & Vomiting: no nausea and no vomiting  Complications: no  Cardiovascular status: blood pressure returned to baseline and hemodynamically stable  Respiratory status: acceptable  Hydration status: euvolemic
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
## 2021-07-22 NOTE — PROGRESS NOTES
Provide a ride notified that patient is continues to wait for  from gastro center. They advise an estimated arrival time of 1 hour.

## 2021-07-23 NOTE — FLOWSHEET NOTE
[employer] member at [address] [place] [address] answered phone ad spoke with this RN. Pt doing well and having no difficulties.

## 2021-08-24 ENCOUNTER — VIRTUAL VISIT (OUTPATIENT)
Dept: PAIN MANAGEMENT | Age: 61
End: 2021-08-24
Payer: MEDICAID

## 2021-08-24 DIAGNOSIS — M54.41 CHRONIC BILATERAL LOW BACK PAIN WITH BILATERAL SCIATICA: ICD-10-CM

## 2021-08-24 DIAGNOSIS — M54.42 CHRONIC BILATERAL LOW BACK PAIN WITH BILATERAL SCIATICA: ICD-10-CM

## 2021-08-24 DIAGNOSIS — G89.4 CHRONIC PAIN SYNDROME: ICD-10-CM

## 2021-08-24 DIAGNOSIS — G89.29 CHRONIC BILATERAL LOW BACK PAIN WITH BILATERAL SCIATICA: ICD-10-CM

## 2021-08-24 DIAGNOSIS — G82.20 PARAPLEGIA (HCC): ICD-10-CM

## 2021-08-24 PROCEDURE — 99213 OFFICE O/P EST LOW 20 MIN: CPT | Performed by: NURSE PRACTITIONER

## 2021-08-24 RX ORDER — LIDOCAINE 50 MG/G
OINTMENT TOPICAL 2 TIMES DAILY
Qty: 2 TUBE | Refills: 3 | Status: SHIPPED | OUTPATIENT
Start: 2021-08-24 | End: 2021-09-23 | Stop reason: SDUPTHER

## 2021-08-24 RX ORDER — GABAPENTIN 600 MG/1
600 TABLET ORAL 2 TIMES DAILY
Qty: 60 TABLET | Refills: 0 | Status: SHIPPED | OUTPATIENT
Start: 2021-08-24 | End: 2021-09-23 | Stop reason: SDUPTHER

## 2021-08-24 ASSESSMENT — ENCOUNTER SYMPTOMS
SHORTNESS OF BREATH: 0
COUGH: 0
BACK PAIN: 1
CONSTIPATION: 0
DIARRHEA: 0
GASTROINTESTINAL NEGATIVE: 1
EYES NEGATIVE: 1
NAUSEA: 0
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
## 2021-09-23 ENCOUNTER — OFFICE VISIT (OUTPATIENT)
Dept: PAIN MANAGEMENT | Age: 61
End: 2021-09-23
Payer: MEDICAID

## 2021-09-23 VITALS — WEIGHT: 210 LBS | TEMPERATURE: 97.2 F | HEIGHT: 72 IN | BODY MASS INDEX: 28.44 KG/M2

## 2021-09-23 DIAGNOSIS — G82.20 PARAPLEGIA (HCC): ICD-10-CM

## 2021-09-23 DIAGNOSIS — G89.4 CHRONIC PAIN SYNDROME: ICD-10-CM

## 2021-09-23 DIAGNOSIS — G89.29 CHRONIC BILATERAL LOW BACK PAIN WITH BILATERAL SCIATICA: ICD-10-CM

## 2021-09-23 DIAGNOSIS — M54.42 CHRONIC BILATERAL LOW BACK PAIN WITH BILATERAL SCIATICA: ICD-10-CM

## 2021-09-23 DIAGNOSIS — M54.41 CHRONIC BILATERAL LOW BACK PAIN WITH BILATERAL SCIATICA: ICD-10-CM

## 2021-09-23 DIAGNOSIS — M47.817 LUMBOSACRAL SPONDYLOSIS WITHOUT MYELOPATHY: Primary | ICD-10-CM

## 2021-09-23 PROCEDURE — 99214 OFFICE O/P EST MOD 30 MIN: CPT | Performed by: PAIN MEDICINE

## 2021-09-23 RX ORDER — LIDOCAINE 50 MG/G
OINTMENT TOPICAL 2 TIMES DAILY
Qty: 1 EACH | Refills: 5 | Status: SHIPPED | OUTPATIENT
Start: 2021-09-23 | End: 2021-11-17 | Stop reason: SDUPTHER

## 2021-09-23 RX ORDER — GABAPENTIN 600 MG/1
600 TABLET ORAL 2 TIMES DAILY
Qty: 60 TABLET | Refills: 0 | Status: SHIPPED | OUTPATIENT
Start: 2021-09-23 | End: 2021-11-02 | Stop reason: SDUPTHER
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
## 2021-09-24 ENCOUNTER — TELEPHONE (OUTPATIENT)
Dept: PAIN MANAGEMENT | Age: 61
End: 2021-09-24

## 2021-09-24 NOTE — TELEPHONE ENCOUNTER
ORDER PLACED:    Date: 9/23/21  Description: Liu HOUSTON  Order Number: [id number]  Ordering Provider: [facility]  Performing Provider: [facility]  CPT Codes: 03930,36280  ICD10 Codes: M47.817    PER [employer] PATIENT IS TRADITIONAL MEDICAID  NO AUTH REQUIRED

## 2021-09-24 NOTE — TELEPHONE ENCOUNTER
KENA L3,4,5 MBB    NO AUTH REQUIRED    OK to schedule procedure approved as above. Please note sides/levels approved and date range. (If applicable, sides/levels approved may differ from those ordered)    TO BE SCHEDULED WITH DR. NARAYANAN

## 2021-10-06 ENCOUNTER — PROCEDURE VISIT (OUTPATIENT)
Dept: PAIN MANAGEMENT | Age: 61
End: 2021-10-06
Payer: MEDICAID

## 2021-10-06 DIAGNOSIS — G82.20 PARAPLEGIA (HCC): ICD-10-CM

## 2021-10-06 DIAGNOSIS — M47.817 LUMBOSACRAL SPONDYLOSIS WITHOUT MYELOPATHY: ICD-10-CM

## 2021-10-06 DIAGNOSIS — G89.4 CHRONIC PAIN SYNDROME: ICD-10-CM

## 2021-10-06 PROCEDURE — 64494 INJ PARAVERT F JNT L/S 2 LEV: CPT | Performed by: PAIN MEDICINE

## 2021-10-06 PROCEDURE — 64493 INJ PARAVERT F JNT L/S 1 LEV: CPT | Performed by: PAIN MEDICINE

## 2021-10-06 RX ORDER — LIDOCAINE HYDROCHLORIDE 10 MG/ML
3 INJECTION, SOLUTION EPIDURAL; INFILTRATION; INTRACAUDAL; PERINEURAL ONCE
Status: COMPLETED | OUTPATIENT
Start: 2021-10-06 | End: 2021-10-06

## 2021-10-06 RX ADMIN — LIDOCAINE HYDROCHLORIDE 3 ML: 10 INJECTION, SOLUTION EPIDURAL; INFILTRATION; INTRACAUDAL; PERINEURAL at 13:58

## 2021-10-06 NOTE — PROGRESS NOTES
[facility]) Physicians  Neurosurgery and Pain [facility]  [address] [facility], [address] [facility] , [address] [facility], [place]: [phone]  F: [phone]          LUMBAR/SACRAL MEDIAL BRANCH BLOCK      Provider: Priya Garsia MD          Patient Name: Opal Wilkerson : 1960        Date: 10/6/2021       Opal Wilkerson is here today for interventional pain management. Discussed the risks including but not limited to bleeding, infection, worsened pain, damage to surrounding structures, side effects, toxicity, allergic reactions to medications used, need for surgery, pneumothorax, abdominal and visceral injury, as well as catastrophic injury such as vision loss, paralysis, spinal cord or plexus injury, stroke, bowel or bladder incontinence, chest tube insertion, ventilator dependence, and death. Discussed the risks, benefits, and alternatives to the procedure including no procedure at all. Discussed that we cannot undo any permanent neurologic or orthopaedic damage or change the course of any underlying disease. After thorough discussion, patient expressed understanding and willingness to proceed. Written consent was obtained and is in the chart. Verbal consent to proceed was obtained. Standard ASIPP guidelines were followed and sterile technique used. Area was cleaned with Betadine x3. Informed consent was obtained. Fluoroscopic guidance was used for this procedure. Junction of superior articular process and transverse process was identified. For L5 dorsal primary ramus groove of sacral ala and SAP of S1 was identified. Appropriate length spinal needle was used and advanced to correct anatomic location. Negative aspiration was achieved. At each site approximately 1/2cc of 1% preservative free Lidocaine was injected without difficulty. Patient tolerated the procedure well, no obvious complications occurred during the procedure.   Patient was appropriately monitored and discharged home in stable condition with their usual motor strength. The patient will keep close track of pain over the next several hours and call our office tomorrow and let us know what percentage of pain relief is experienced. Post op Instructions were given to patient. [x] Bilateral [] T12 [] S1      [] L1 [] S2     [] Right [] L2 [] S3      [x] L3      [] Left [x] L4         [x] L5 [] S. I. Patient has >80% pain relief following procedure with positive facet joint provocative maneuvers.  Schedule second diagnostics MBB trial    Polina Garrett MD

## 2021-10-28 DIAGNOSIS — M47.817 LUMBOSACRAL SPONDYLOSIS WITHOUT MYELOPATHY: ICD-10-CM

## 2021-10-28 DIAGNOSIS — G82.20 PARAPLEGIA (HCC): ICD-10-CM

## 2021-10-28 DIAGNOSIS — G89.29 CHRONIC BILATERAL LOW BACK PAIN WITH BILATERAL SCIATICA: ICD-10-CM

## 2021-10-28 DIAGNOSIS — M54.41 CHRONIC BILATERAL LOW BACK PAIN WITH BILATERAL SCIATICA: ICD-10-CM

## 2021-10-28 DIAGNOSIS — G89.4 CHRONIC PAIN SYNDROME: ICD-10-CM

## 2021-10-28 DIAGNOSIS — M54.42 CHRONIC BILATERAL LOW BACK PAIN WITH BILATERAL SCIATICA: ICD-10-CM

## 2021-11-02 RX ORDER — GABAPENTIN 600 MG/1
600 TABLET ORAL 2 TIMES DAILY
Qty: 60 TABLET | Refills: 0 | Status: SHIPPED | OUTPATIENT
Start: 2021-11-02 | End: 2021-12-15 | Stop reason: SDUPTHER

## 2021-11-09 ENCOUNTER — OFFICE VISIT (OUTPATIENT)
Dept: PAIN MANAGEMENT | Age: 61
End: 2021-11-09
Payer: MEDICAID

## 2021-11-09 VITALS — BODY MASS INDEX: 29.16 KG/M2 | TEMPERATURE: 97.6 F | RESPIRATION RATE: 16 BRPM | WEIGHT: 220 LBS | HEIGHT: 73 IN

## 2021-11-09 DIAGNOSIS — M47.817 LUMBOSACRAL SPONDYLOSIS WITHOUT MYELOPATHY: Primary | ICD-10-CM

## 2021-11-09 DIAGNOSIS — G82.20 PARAPLEGIA (HCC): ICD-10-CM

## 2021-11-09 DIAGNOSIS — G89.4 CHRONIC PAIN SYNDROME: ICD-10-CM

## 2021-11-09 PROCEDURE — 99213 OFFICE O/P EST LOW 20 MIN: CPT | Performed by: PAIN MEDICINE

## 2021-11-09 ASSESSMENT — ENCOUNTER SYMPTOMS
BACK PAIN: 1
SHORTNESS OF BREATH: 0
DIARRHEA: 0
NAUSEA: 0
CONSTIPATION: 0
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
## 2021-11-10 ENCOUNTER — TELEPHONE (OUTPATIENT)
Dept: PAIN MANAGEMENT | Age: 61
End: 2021-11-10

## 2021-11-10 NOTE — TELEPHONE ENCOUNTER
SECOND BILAT L3,4,5 MBB    NO AUTH REQUIRED    OK to schedule procedure approved as above. Please note sides/levels approved and date range. (If applicable, sides/levels approved may differ from those ordered)    TO BE SCHEDULED WITH DR. NARAYANAN

## 2021-11-17 ENCOUNTER — PROCEDURE VISIT (OUTPATIENT)
Dept: PAIN MANAGEMENT | Age: 61
End: 2021-11-17
Payer: MEDICAID

## 2021-11-17 DIAGNOSIS — M47.817 LUMBOSACRAL SPONDYLOSIS WITHOUT MYELOPATHY: ICD-10-CM

## 2021-11-17 DIAGNOSIS — M54.42 CHRONIC BILATERAL LOW BACK PAIN WITH BILATERAL SCIATICA: ICD-10-CM

## 2021-11-17 DIAGNOSIS — G89.29 CHRONIC BILATERAL LOW BACK PAIN WITH BILATERAL SCIATICA: ICD-10-CM

## 2021-11-17 DIAGNOSIS — G82.20 PARAPLEGIA (HCC): ICD-10-CM

## 2021-11-17 DIAGNOSIS — M54.41 CHRONIC BILATERAL LOW BACK PAIN WITH BILATERAL SCIATICA: ICD-10-CM

## 2021-11-17 DIAGNOSIS — G89.4 CHRONIC PAIN SYNDROME: ICD-10-CM

## 2021-11-17 PROCEDURE — 64494 INJ PARAVERT F JNT L/S 2 LEV: CPT | Performed by: PAIN MEDICINE

## 2021-11-17 PROCEDURE — 64493 INJ PARAVERT F JNT L/S 1 LEV: CPT | Performed by: PAIN MEDICINE

## 2021-11-17 RX ORDER — LIDOCAINE 50 MG/G
OINTMENT TOPICAL 2 TIMES DAILY
Qty: 1 EACH | Refills: 5 | Status: SHIPPED | OUTPATIENT
Start: 2021-11-17 | End: 2022-04-28 | Stop reason: SDUPTHER

## 2021-11-17 RX ORDER — LIDOCAINE HYDROCHLORIDE 10 MG/ML
3 INJECTION, SOLUTION EPIDURAL; INFILTRATION; INTRACAUDAL; PERINEURAL ONCE
Status: COMPLETED | OUTPATIENT
Start: 2021-11-17 | End: 2021-11-17

## 2021-11-17 RX ADMIN — LIDOCAINE HYDROCHLORIDE 3 ML: 10 INJECTION, SOLUTION EPIDURAL; INFILTRATION; INTRACAUDAL; PERINEURAL at 11:41

## 2021-11-17 NOTE — PROGRESS NOTES
[address] [facility] Physicians  Neurosurgery and Pain [facility]  [address] [facility], [address] , [address] [facility], [place]: [phone]  F: [phone]          LUMBAR/SACRAL MEDIAL BRANCH BLOCK      Provider: Isidro Pendleton MD          Patient Name: Gertrudis Austin : 1960        Date: 2021       Gertrudis Austin is here today for interventional pain management. Discussed the risks including but not limited to bleeding, infection, worsened pain, damage to surrounding structures, side effects, toxicity, allergic reactions to medications used, need for surgery, pneumothorax, abdominal and visceral injury, as well as catastrophic injury such as vision loss, paralysis, spinal cord or plexus injury, stroke, bowel or bladder incontinence, chest tube insertion, ventilator dependence, and death. Discussed the risks, benefits, and alternatives to the procedure including no procedure at all. Discussed that we cannot undo any permanent neurologic or orthopaedic damage or change the course of any underlying disease. After thorough discussion, patient expressed understanding and willingness to proceed. Written consent was obtained and is in the chart. Verbal consent to proceed was obtained. Standard ASIPP guidelines were followed and sterile technique used. Area was cleaned with Betadine x3. Informed consent was obtained. Fluoroscopic guidance was used for this procedure. Junction of superior articular process and transverse process was identified. For L5 dorsal primary ramus groove of sacral ala and SAP of S1 was identified. Appropriate length spinal needle was used and advanced to correct anatomic location. Negative aspiration was achieved. At each site approximately 1/2cc of 1% preservative free Lidocaine was injected without difficulty. Patient tolerated the procedure well, no obvious complications occurred during the procedure.   Patient was appropriately monitored and discharged home in stable condition with their usual motor strength. The patient will keep close track of pain over the next several hours and call our office tomorrow and let us know what percentage of pain relief is experienced. Post op Instructions were given to patient. [x] Bilateral [] T12 [] S1      [] L1 [] S2     [] Right [] L2 [] S3      [x] L3      [] Left [x] L4         [x] L5 [] S. I. Patient has >80% pain relief following procedure with positive facet joint provocative maneuvers. Schedule RF ablation.      Joan Cornelius MD

## 2021-12-15 DIAGNOSIS — M54.42 CHRONIC BILATERAL LOW BACK PAIN WITH BILATERAL SCIATICA: ICD-10-CM

## 2021-12-15 DIAGNOSIS — G89.29 CHRONIC BILATERAL LOW BACK PAIN WITH BILATERAL SCIATICA: ICD-10-CM

## 2021-12-15 DIAGNOSIS — M47.817 LUMBOSACRAL SPONDYLOSIS WITHOUT MYELOPATHY: ICD-10-CM

## 2021-12-15 DIAGNOSIS — G82.20 PARAPLEGIA (HCC): ICD-10-CM

## 2021-12-15 DIAGNOSIS — G89.4 CHRONIC PAIN SYNDROME: ICD-10-CM

## 2021-12-15 DIAGNOSIS — M54.41 CHRONIC BILATERAL LOW BACK PAIN WITH BILATERAL SCIATICA: ICD-10-CM

## 2021-12-15 RX ORDER — GABAPENTIN 600 MG/1
600 TABLET ORAL 2 TIMES DAILY
Qty: 44 TABLET | Refills: 0 | Status: SHIPPED | OUTPATIENT
Start: 2021-12-15 | End: 2022-01-06

## 2022-04-28 ENCOUNTER — OFFICE VISIT (OUTPATIENT)
Dept: PAIN MANAGEMENT | Age: 62
End: 2022-04-28
Payer: MEDICAID

## 2022-04-28 VITALS
WEIGHT: 210 LBS | DIASTOLIC BLOOD PRESSURE: 78 MMHG | SYSTOLIC BLOOD PRESSURE: 122 MMHG | HEIGHT: 73 IN | TEMPERATURE: 96.8 F | BODY MASS INDEX: 27.83 KG/M2

## 2022-04-28 DIAGNOSIS — G89.4 CHRONIC PAIN SYNDROME: ICD-10-CM

## 2022-04-28 DIAGNOSIS — M54.42 CHRONIC BILATERAL LOW BACK PAIN WITH BILATERAL SCIATICA: ICD-10-CM

## 2022-04-28 DIAGNOSIS — M54.41 CHRONIC BILATERAL LOW BACK PAIN WITH BILATERAL SCIATICA: ICD-10-CM

## 2022-04-28 DIAGNOSIS — G89.29 CHRONIC BILATERAL LOW BACK PAIN WITH BILATERAL SCIATICA: ICD-10-CM

## 2022-04-28 DIAGNOSIS — G82.20 PARAPLEGIA (HCC): ICD-10-CM

## 2022-04-28 DIAGNOSIS — M47.817 LUMBOSACRAL SPONDYLOSIS WITHOUT MYELOPATHY: Primary | ICD-10-CM

## 2022-04-28 PROCEDURE — 99214 OFFICE O/P EST MOD 30 MIN: CPT | Performed by: NURSE PRACTITIONER

## 2022-04-28 RX ORDER — GABAPENTIN 600 MG/1
600 TABLET ORAL 2 TIMES DAILY
Qty: 60 TABLET | Refills: 2 | Status: SHIPPED | OUTPATIENT
Start: 2022-04-28 | End: 2022-05-28

## 2022-04-28 RX ORDER — LIDOCAINE 50 MG/G
OINTMENT TOPICAL 2 TIMES DAILY
Qty: 1 EACH | Refills: 5 | Status: SHIPPED | OUTPATIENT
Start: 2022-04-28 | End: 2022-05-28

## 2022-04-28 ASSESSMENT — ENCOUNTER SYMPTOMS
RESPIRATORY NEGATIVE: 1
BACK PAIN: 1
CONSTIPATION: 0
DIARRHEA: 0
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
## 2022-04-29 ENCOUNTER — TELEPHONE (OUTPATIENT)
Dept: PAIN MANAGEMENT | Age: 62
End: 2022-04-29

## 2022-04-29 NOTE — TELEPHONE ENCOUNTER
KENA L3,4,5 RFA    NO AUTH REQUIRED    OK to schedule procedure approved as above. Please note sides/levels approved and date range. (If applicable, sides/levels approved may differ from those ordered)    TO BE SCHEDULED WITH DR. NARAYANAN

## 2022-04-29 NOTE — TELEPHONE ENCOUNTER
ORDER PLACED:    Date: 4/28/22  Description: Ann Marie Graf RFA  Order Number: [id number]  Ordering Provider: [facility]  Performing Provider: [facility]  CPT Codes: 08819,62560  ICD10 Codes: M47.817    PER [place] PATIENT IS TRADITIONAL MEDICAID, NO AUTH REQUIRED

## 2022-05-11 ENCOUNTER — OFFICE VISIT (OUTPATIENT)
Dept: PAIN MANAGEMENT | Age: 62
End: 2022-05-11
Payer: MEDICAID

## 2022-05-11 DIAGNOSIS — M47.817 LUMBOSACRAL SPONDYLOSIS WITHOUT MYELOPATHY: ICD-10-CM

## 2022-05-11 PROCEDURE — 64636 DESTROY L/S FACET JNT ADDL: CPT | Performed by: PAIN MEDICINE

## 2022-05-11 PROCEDURE — 64635 DESTROY LUMB/SAC FACET JNT: CPT | Performed by: PAIN MEDICINE

## 2022-05-11 RX ORDER — BETAMETHASONE SODIUM PHOSPHATE AND BETAMETHASONE ACETATE 3; 3 MG/ML; MG/ML
6 INJECTION, SUSPENSION INTRA-ARTICULAR; INTRALESIONAL; INTRAMUSCULAR; SOFT TISSUE ONCE
Status: COMPLETED | OUTPATIENT
Start: 2022-05-11 | End: 2022-05-11

## 2022-05-11 RX ORDER — LIDOCAINE HYDROCHLORIDE 10 MG/ML
3 INJECTION, SOLUTION EPIDURAL; INFILTRATION; INTRACAUDAL; PERINEURAL ONCE
Status: COMPLETED | OUTPATIENT
Start: 2022-05-11 | End: 2022-05-11

## 2022-05-11 RX ADMIN — BETAMETHASONE SODIUM PHOSPHATE AND BETAMETHASONE ACETATE 6 MG: 3; 3 INJECTION, SUSPENSION INTRA-ARTICULAR; INTRALESIONAL; INTRAMUSCULAR; SOFT TISSUE at 12:54

## 2022-05-11 RX ADMIN — LIDOCAINE HYDROCHLORIDE 3 ML: 10 INJECTION, SOLUTION EPIDURAL; INFILTRATION; INTRACAUDAL; PERINEURAL at 12:54

## 2022-05-11 NOTE — PROGRESS NOTES
[facility]) Physicians  Neurosurgery and Pain [facility]  [address] [facility] , [address] [facility], [address]: [phone]  F: [phone]             Provider: Arabella Cruz MD          Patient Name: Larry Dan : 1960        Date: 2022         PROCEDURE:  Bilateral L3, L4, L5 medial branch ablation by radiofrequency. Discussed the risks including but not limited to bleeding, infection, worsened pain, damage to surrounding structures, side effects, toxicity, allergic reactions to medications used, need for surgery, pneumothorax, abdominal and visceral injury, as well as catastrophic injury such as vision loss, paralysis, spinal cord or plexus injury, stroke, bowel or bladder incontinence, chest tube insertion, ventilator dependence, and death. Discussed the risks, benefits, and alternatives to the procedure including no procedure at all. Discussed that we cannot undo any permanent neurologic or orthopaedic damage or change the course of any underlying disease. After thorough discussion, patient expressed understanding and willingness to proceed. Written consent was obtained and is in the chart. Verbal consent to proceed was obtained. Description of Procedure: The procedure and potential complications were explained to the patient and a voluntary informed, signed consent was obtained. The patient was placed prone on the x-ray table and the mid back was prepped with Betadine solution. Under fluoroscopic guidance the skin was infiltrated with 1% preservative free lidocaine. A 20-gauge, 3.5 inch long curved cannula with a 10 mm active curved tip was inserted to place the cannula tip on the junction of the superior articular process and transverse process where the medial branches are located. After motor tests were done 0.5 mL of 1% preservative free lidocaine was injected at each site.   Ablation was carried out at 80 degrees Celsius for 95 seconds and it was repeated one more time by repositioning the cannula tip.  6 mg of dexamethasone was divided and injected at each level before the removal of the cannula. The patient tolerated the procedure very well. Summary and Recommendations: The patient will follow up in the office.     Gail Foley MD.

## 2022-06-21 DIAGNOSIS — G82.20 PARAPLEGIA (HCC): ICD-10-CM

## 2022-06-21 DIAGNOSIS — G89.4 CHRONIC PAIN SYNDROME: ICD-10-CM

## 2022-06-21 DIAGNOSIS — M47.817 LUMBOSACRAL SPONDYLOSIS WITHOUT MYELOPATHY: ICD-10-CM

## 2022-06-21 DIAGNOSIS — M54.41 CHRONIC BILATERAL LOW BACK PAIN WITH BILATERAL SCIATICA: ICD-10-CM

## 2022-06-21 DIAGNOSIS — G89.29 CHRONIC BILATERAL LOW BACK PAIN WITH BILATERAL SCIATICA: ICD-10-CM

## 2022-06-21 DIAGNOSIS — M54.42 CHRONIC BILATERAL LOW BACK PAIN WITH BILATERAL SCIATICA: ICD-10-CM

## 2022-06-22 RX ORDER — GABAPENTIN 600 MG/1
TABLET ORAL
Qty: 60 TABLET | Refills: 2 | OUTPATIENT
Start: 2022-06-22

## 2022-07-06 ENCOUNTER — SCHEDULED TELEPHONE ENCOUNTER (OUTPATIENT)
Dept: PAIN MANAGEMENT | Age: 62
End: 2022-07-06
Payer: MEDICAID

## 2022-07-06 DIAGNOSIS — M47.817 LUMBOSACRAL SPONDYLOSIS WITHOUT MYELOPATHY: Primary | ICD-10-CM

## 2022-07-06 DIAGNOSIS — G82.20 PARAPLEGIA (HCC): ICD-10-CM

## 2022-07-06 DIAGNOSIS — G89.4 CHRONIC PAIN SYNDROME: ICD-10-CM

## 2022-07-06 PROCEDURE — 99213 OFFICE O/P EST LOW 20 MIN: CPT | Performed by: NURSE PRACTITIONER

## 2022-07-06 ASSESSMENT — ENCOUNTER SYMPTOMS
RESPIRATORY NEGATIVE: 1
DIARRHEA: 0
BACK PAIN: 1
CONSTIPATION: 0
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
## 2023-03-01 ENCOUNTER — DOCUMENTATION (OUTPATIENT)
Dept: PRIMARY CARE | Facility: CLINIC | Age: 63
End: 2023-03-01

## 2023-03-06 ENCOUNTER — DOCUMENTATION (OUTPATIENT)
Dept: PRIMARY CARE | Facility: CLINIC | Age: 63
End: 2023-03-06

## 2023-03-13 ENCOUNTER — PATIENT OUTREACH (OUTPATIENT)
Dept: PRIMARY CARE | Facility: CLINIC | Age: 63
End: 2023-03-13

## 2023-03-13 DIAGNOSIS — I10 ESSENTIAL HYPERTENSION, BENIGN: ICD-10-CM

## 2023-03-13 DIAGNOSIS — N31.9 NEUROGENIC DYSFUNCTION OF THE URINARY BLADDER: ICD-10-CM

## 2023-03-13 NOTE — CARE PLAN
Spoke with bryn Ashraf regarding patient status. Patient is currently considered a permanent resident of [facility] in [place]. PCP has now changed from Dr. Christensen to Dr. Arnett. Pascale states this change is only if her uncle decides he does not want to go out for appointments. He could stay with Dr. Christensen if he chooses to go out. Advised Pascale, if patient stays with Dr. Arnett, he will need to be discharged from [facility] services. Pascale will confirm with the patient his decision and notify Care Manager.    Pascale states the patient is adjusting to living in the SNF. Sometimes he likes it, other times he does not. However, she feels he is much safer in his current environment than living independently. The patient's lease at his former apartment was canceled eliminating the option for his return.     Advised caregiver of change in Care Manager. New Care Manager to follow up on the status of CCM services.

## 2023-03-29 ENCOUNTER — PATIENT OUTREACH (OUTPATIENT)
Dept: PRIMARY CARE | Facility: CLINIC | Age: 63
End: 2023-03-29

## 2023-03-29 DIAGNOSIS — N31.9 NEUROGENIC DYSFUNCTION OF THE URINARY BLADDER: ICD-10-CM

## 2023-03-29 DIAGNOSIS — I10 ESSENTIAL HYPERTENSION, BENIGN: ICD-10-CM

## 2023-03-29 NOTE — PROGRESS NOTES
RN YINKA reached out to patient's niece, Ludy to introduce myself to her as well as to see how her uncle was doing and if he is in fact going to use the facility doctor at the nursing home. She states that he is doing well there, seeing the dentist but is unsure if he has seen the doctor. She is going to see him today and will call me back to let me know. Provided her my contact information.

## 2023-04-25 ENCOUNTER — DOCUMENTATION (OUTPATIENT)
Dept: PRIMARY CARE | Facility: CLINIC | Age: 63
End: 2023-04-25

## 2023-04-25 NOTE — PROGRESS NOTES
RN CM researched and determined that patient is still in [facility] permanently and he is using the facility PCP. CCM services closed.

## 2023-05-16 ENCOUNTER — HOSPITAL ENCOUNTER (OUTPATIENT)
Dept: DATA CONVERSION | Facility: HOSPITAL | Age: 63
End: 2023-05-16
Attending: UROLOGY | Admitting: UROLOGY
Payer: COMMERCIAL

## 2023-05-16 DIAGNOSIS — G82.20 PARAPLEGIA, UNSPECIFIED (MULTI): ICD-10-CM

## 2023-05-16 DIAGNOSIS — I25.10 ATHEROSCLEROTIC HEART DISEASE OF NATIVE CORONARY ARTERY WITHOUT ANGINA PECTORIS: ICD-10-CM

## 2023-05-16 DIAGNOSIS — K56.609 UNSPECIFIED INTESTINAL OBSTRUCTION, UNSPECIFIED AS TO PARTIAL VERSUS COMPLETE OBSTRUCTION (MULTI): ICD-10-CM

## 2023-05-16 DIAGNOSIS — I10 ESSENTIAL (PRIMARY) HYPERTENSION: ICD-10-CM

## 2023-05-16 DIAGNOSIS — N17.9 ACUTE KIDNEY FAILURE, UNSPECIFIED (CMS-HCC): ICD-10-CM

## 2023-05-16 DIAGNOSIS — Z88.0 ALLERGY STATUS TO PENICILLIN: ICD-10-CM

## 2023-05-16 DIAGNOSIS — Z87.891 PERSONAL HISTORY OF NICOTINE DEPENDENCE: ICD-10-CM

## 2023-05-16 DIAGNOSIS — E78.5 HYPERLIPIDEMIA, UNSPECIFIED: ICD-10-CM

## 2023-05-16 DIAGNOSIS — I25.2 OLD MYOCARDIAL INFARCTION: ICD-10-CM

## 2023-05-16 DIAGNOSIS — R31.9 HEMATURIA, UNSPECIFIED: ICD-10-CM

## 2023-05-16 DIAGNOSIS — N39.0 URINARY TRACT INFECTION, SITE NOT SPECIFIED: ICD-10-CM

## 2023-05-16 DIAGNOSIS — N31.9 NEUROMUSCULAR DYSFUNCTION OF BLADDER, UNSPECIFIED: ICD-10-CM

## 2023-05-16 DIAGNOSIS — D72.829 ELEVATED WHITE BLOOD CELL COUNT, UNSPECIFIED: ICD-10-CM

## 2023-09-07 VITALS
SYSTOLIC BLOOD PRESSURE: 148 MMHG | DIASTOLIC BLOOD PRESSURE: 94 MMHG | TEMPERATURE: 97.5 F | RESPIRATION RATE: 16 BRPM | HEART RATE: 70 BPM

## 2023-09-30 NOTE — H&P
History of Present Illness:   History Present Illness:  Reason for surgery: urinary retention   HPI:    Mr Eastman has urinary retention from neurogenic bladder from a prior spinal cord injury with chronic indwelling Ayon presents today for suprapubic tube    Allergies:        Allergies:  ·  penicillin : Unknown    Home Medication Review:   Home Medications Reviewed: yes     Impression/Procedure:   ·  Impression and Planned Procedure: Urinary retention, proceed with suprapubic tube       ERAS (Enhanced Recovery After Surgery):  ·  ERAS Patient: no       Vital Signs:  Temperature C: 36.4 degrees C   Temperature F: 97.5 degrees F   Heart Rate: 70 beats per minute   Respiratory Rate: 16 breath per minute   Blood Pressure Systolic: 148 mm/Hg   Blood Pressure Diastolic: 94 mm/Hg     Physical Exam by System:    Respiratory/Thorax: Clear to auscultation   Cardiovascular: Regular rate and rhythm, normal S1/2     Consent:   COVID-19 Consent:  ·  COVID-19 Risk Consent Surgeon has reviewed key risks related to the risk of marcial COVID-19 and if they contract COVID-19 what the risks are.       Electronic Signatures:  Justin Gu)  (Signed 16-May-2023 13:06)   Authored: History of Present Illness, Allergies, Home  Medication Review, Impression/Procedure, ERAS, Physical Exam, Consent, Note Completion      Last Updated: 16-May-2023 13:06 by Justin Gu)

## 2023-10-03 ENCOUNTER — HOSPITAL ENCOUNTER (EMERGENCY)
Facility: HOSPITAL | Age: 63
Discharge: HOME | End: 2023-10-04
Attending: EMERGENCY MEDICINE
Payer: COMMERCIAL

## 2023-10-03 ENCOUNTER — APPOINTMENT (OUTPATIENT)
Dept: RADIOLOGY | Facility: HOSPITAL | Age: 63
End: 2023-10-03
Payer: COMMERCIAL

## 2023-10-03 DIAGNOSIS — N30.90 CYSTITIS: ICD-10-CM

## 2023-10-03 DIAGNOSIS — T83.010A SUPRAPUBIC CATHETER DYSFUNCTION, INITIAL ENCOUNTER (CMS-HCC): Primary | ICD-10-CM

## 2023-10-03 LAB
ALBUMIN SERPL BCP-MCNC: 4.5 G/DL (ref 3.4–5)
ALP SERPL-CCNC: 96 U/L (ref 33–136)
ALT SERPL W P-5'-P-CCNC: 54 U/L (ref 10–52)
ANION GAP SERPL CALC-SCNC: 13 MMOL/L (ref 10–20)
AST SERPL W P-5'-P-CCNC: 52 U/L (ref 9–39)
BASOPHILS # BLD AUTO: 0.1 X10*3/UL (ref 0–0.1)
BASOPHILS NFR BLD AUTO: 0.8 %
BILIRUB SERPL-MCNC: 0.5 MG/DL (ref 0–1.2)
BUN SERPL-MCNC: 16 MG/DL (ref 6–23)
CALCIUM SERPL-MCNC: 10 MG/DL (ref 8.6–10.3)
CHLORIDE SERPL-SCNC: 100 MMOL/L (ref 98–107)
CO2 SERPL-SCNC: 27 MMOL/L (ref 21–32)
CREAT SERPL-MCNC: 0.94 MG/DL (ref 0.5–1.3)
EOSINOPHIL # BLD AUTO: 0.24 X10*3/UL (ref 0–0.7)
EOSINOPHIL NFR BLD AUTO: 1.9 %
ERYTHROCYTE [DISTWIDTH] IN BLOOD BY AUTOMATED COUNT: 13.9 % (ref 11.5–14.5)
GFR SERPL CREATININE-BSD FRML MDRD: >90 ML/MIN/1.73M*2
GLUCOSE SERPL-MCNC: 85 MG/DL (ref 74–99)
HCT VFR BLD AUTO: 44.5 % (ref 41–52)
HGB BLD-MCNC: 15.4 G/DL (ref 13.5–17.5)
IMM GRANULOCYTES # BLD AUTO: 0.03 X10*3/UL (ref 0–0.7)
IMM GRANULOCYTES NFR BLD AUTO: 0.2 % (ref 0–0.9)
LIPASE SERPL-CCNC: 25 U/L (ref 9–82)
LYMPHOCYTES # BLD AUTO: 1.52 X10*3/UL (ref 1.2–4.8)
LYMPHOCYTES NFR BLD AUTO: 11.8 %
MCH RBC QN AUTO: 29.9 PG (ref 26–34)
MCHC RBC AUTO-ENTMCNC: 34.6 G/DL (ref 32–36)
MCV RBC AUTO: 86 FL (ref 80–100)
MONOCYTES # BLD AUTO: 1.02 X10*3/UL (ref 0.1–1)
MONOCYTES NFR BLD AUTO: 7.9 %
NEUTROPHILS # BLD AUTO: 9.97 X10*3/UL (ref 1.2–7.7)
NEUTROPHILS NFR BLD AUTO: 77.4 %
PLATELET # BLD AUTO: 296 X10*3/UL (ref 150–450)
PMV BLD AUTO: 9.8 FL (ref 7.5–11.5)
POTASSIUM SERPL-SCNC: 4.4 MMOL/L (ref 3.5–5.3)
PROT SERPL-MCNC: 8.3 G/DL (ref 6.4–8.2)
RBC # BLD AUTO: 5.15 X10*6/UL (ref 4.5–5.9)
SODIUM SERPL-SCNC: 136 MMOL/L (ref 136–145)
WBC # BLD AUTO: 12.9 X10*3/UL (ref 4.4–11.3)

## 2023-10-03 PROCEDURE — 74177 CT ABD & PELVIS W/CONTRAST: CPT

## 2023-10-03 PROCEDURE — 80053 COMPREHEN METABOLIC PANEL: CPT | Performed by: STUDENT IN AN ORGANIZED HEALTH CARE EDUCATION/TRAINING PROGRAM

## 2023-10-03 PROCEDURE — 83690 ASSAY OF LIPASE: CPT | Performed by: STUDENT IN AN ORGANIZED HEALTH CARE EDUCATION/TRAINING PROGRAM

## 2023-10-03 PROCEDURE — 99285 EMERGENCY DEPT VISIT HI MDM: CPT | Performed by: EMERGENCY MEDICINE

## 2023-10-03 PROCEDURE — 74177 CT ABD & PELVIS W/CONTRAST: CPT | Performed by: STUDENT IN AN ORGANIZED HEALTH CARE EDUCATION/TRAINING PROGRAM

## 2023-10-03 PROCEDURE — 85025 COMPLETE CBC W/AUTO DIFF WBC: CPT | Performed by: STUDENT IN AN ORGANIZED HEALTH CARE EDUCATION/TRAINING PROGRAM

## 2023-10-03 PROCEDURE — 2550000001 HC RX 255 CONTRASTS: Performed by: EMERGENCY MEDICINE

## 2023-10-03 PROCEDURE — 36415 COLL VENOUS BLD VENIPUNCTURE: CPT | Performed by: STUDENT IN AN ORGANIZED HEALTH CARE EDUCATION/TRAINING PROGRAM

## 2023-10-03 PROCEDURE — 99284 EMERGENCY DEPT VISIT MOD MDM: CPT | Mod: 25 | Performed by: EMERGENCY MEDICINE

## 2023-10-03 RX ADMIN — IOHEXOL 75 ML: 350 INJECTION, SOLUTION INTRAVENOUS at 19:55

## 2023-10-03 ASSESSMENT — PAIN - FUNCTIONAL ASSESSMENT: PAIN_FUNCTIONAL_ASSESSMENT: 0-10

## 2023-10-03 ASSESSMENT — PAIN DESCRIPTION - LOCATION: LOCATION: BACK

## 2023-10-03 ASSESSMENT — COLUMBIA-SUICIDE SEVERITY RATING SCALE - C-SSRS
2. HAVE YOU ACTUALLY HAD ANY THOUGHTS OF KILLING YOURSELF?: NO
6. HAVE YOU EVER DONE ANYTHING, STARTED TO DO ANYTHING, OR PREPARED TO DO ANYTHING TO END YOUR LIFE?: NO
1. IN THE PAST MONTH, HAVE YOU WISHED YOU WERE DEAD OR WISHED YOU COULD GO TO SLEEP AND NOT WAKE UP?: NO

## 2023-10-03 ASSESSMENT — PAIN DESCRIPTION - PAIN TYPE: TYPE: CHRONIC PAIN

## 2023-10-03 ASSESSMENT — PAIN SCALES - GENERAL: PAINLEVEL_OUTOF10: 8

## 2023-10-03 NOTE — ED PROVIDER NOTES
HPI   Chief Complaint   Patient presents with    s/p cath problem.        63-year-old male presenting to the ED for suprapubic catheter complications.  Patient states he resides in a group home and today they attempted to flush his catheter because it was not draining and they brought him to the ED because it was clogged.  Patient had this suprapubic catheter placed about 3 months ago with Dr. Gu.  He notes for the past few days he has also been experiencing abdominal distention and several pounds of weight gain.  Denies fevers, vomiting.  He had a bowel movement 2 days ago with hard stools and states this is abnormal for him.      History provided by:  Patient                      No data recorded                Patient History   Past Medical History:   Diagnosis Date    Age-related nuclear cataract, left eye 01/08/2019    Age-related nuclear cataract, left    Age-related nuclear cataract, right eye 01/08/2019    Age-related nuclear cataract, right    Other specified disorders of nose and nasal sinuses 12/06/2019    Nasal vestibulitis    Spondylosis without myelopathy or radiculopathy, site unspecified 10/12/2017    Degenerative spinal arthritis     Past Surgical History:   Procedure Laterality Date    OTHER SURGICAL HISTORY  12/06/2019    Hand surgery    OTHER SURGICAL HISTORY  07/26/2019    Cataract surgery    OTHER SURGICAL HISTORY  07/26/2019    Cholecystectomy laparoscopic     No family history on file.  Social History     Tobacco Use    Smoking status: Never    Smokeless tobacco: Never   Substance Use Topics    Alcohol use: Not on file    Drug use: Not on file       Physical Exam   ED Triage Vitals [10/03/23 1714]   Temp Heart Rate Resp BP   36.5 °C (97.7 °F) 77 18 (!) 169/97      SpO2 Temp Source Heart Rate Source Patient Position   97 % Temporal Monitor --      BP Location FiO2 (%)     -- --       Physical Exam  Vitals and nursing note reviewed.   Constitutional:       General: He is not in acute  distress.     Appearance: Normal appearance. He is not toxic-appearing.   HENT:      Head: Normocephalic and atraumatic.   Eyes:      Extraocular Movements: Extraocular movements intact.      Conjunctiva/sclera: Conjunctivae normal.   Cardiovascular:      Rate and Rhythm: Normal rate and regular rhythm.      Pulses: Normal pulses.      Heart sounds: Normal heart sounds.   Pulmonary:      Effort: Pulmonary effort is normal.      Breath sounds: Normal breath sounds.   Abdominal:      General: Bowel sounds are normal. There is distension.      Tenderness: There is no abdominal tenderness. There is no guarding or rebound.      Comments: Suprapubic catheter in place without signs of surrounding infection   Musculoskeletal:      Cervical back: Normal range of motion and neck supple.      Right lower leg: Edema present.      Left lower leg: Edema present.   Skin:     General: Skin is warm and dry.      Capillary Refill: Capillary refill takes less than 2 seconds.   Neurological:      Mental Status: He is alert. Mental status is at baseline.         ED Course & MDM   Diagnoses as of 10/04/23 0236   Suprapubic catheter dysfunction, initial encounter (CMS/[facility])   Cystitis       Medical Decision Making  63-year-old male with a past medical history of paraplegia and suprapubic catheter presenting to the ED for catheter dysfunction as well and has abdominal distention.  He does not have any focal tenderness on abdominal exam.  He is well-appearing and afebrile.    Labs showed normal renal function and mild leukocytosis of 12.9.  CT of the abdomen pelvis shows a distended bladder with wall thickening likely due to cystitis.  There are also materials in the catheter, likely causing the blockage.  No evidence of bowel obstruction.    Consult: Discussed with Dr. Magdaleno who recommended exchanging the suprapubic catheter in the ED. This was done successfully by myself and there was immediate urine flow into the catheter bag  indicating successful placement.    Urinalysis was positive for nitrates, leukocytes and bacteria.  Patient has a urine culture from 03/2023 that shows Enterobacter cloacae susceptible to Bactrim.  He was given a prescription.    Transportation was arranged and patient was discharged to his nursing facility.    Amount and/or Complexity of Data Reviewed  External Data Reviewed: labs and notes.  Labs: ordered.  Radiology: ordered and independent interpretation performed.        Procedure  Procedures     Rolando Anaya DO  Resident  10/04/23 0236

## 2023-10-04 VITALS
WEIGHT: 196 LBS | HEIGHT: 73 IN | SYSTOLIC BLOOD PRESSURE: 146 MMHG | DIASTOLIC BLOOD PRESSURE: 89 MMHG | OXYGEN SATURATION: 95 % | RESPIRATION RATE: 18 BRPM | BODY MASS INDEX: 25.98 KG/M2 | HEART RATE: 72 BPM | TEMPERATURE: 97.5 F

## 2023-10-04 LAB
APPEARANCE UR: ABNORMAL
BACTERIA #/AREA URNS AUTO: ABNORMAL /HPF
BILIRUB UR STRIP.AUTO-MCNC: NEGATIVE MG/DL
COLOR UR: YELLOW
GLUCOSE UR STRIP.AUTO-MCNC: NEGATIVE MG/DL
KETONES UR STRIP.AUTO-MCNC: NEGATIVE MG/DL
LEUKOCYTE ESTERASE UR QL STRIP.AUTO: ABNORMAL
NITRITE UR QL STRIP.AUTO: POSITIVE
PH UR STRIP.AUTO: 7 [PH]
PROT UR STRIP.AUTO-MCNC: ABNORMAL MG/DL
RBC # UR STRIP.AUTO: ABNORMAL /UL
RBC #/AREA URNS AUTO: >20 /HPF
SP GR UR STRIP.AUTO: 1.02
UROBILINOGEN UR STRIP.AUTO-MCNC: <2 MG/DL
WBC #/AREA URNS AUTO: >50 /HPF
YEAST BUDDING #/AREA UR COMP ASSIST: PRESENT /HPF

## 2023-10-04 PROCEDURE — 87186 SC STD MICRODIL/AGAR DIL: CPT | Performed by: STUDENT IN AN ORGANIZED HEALTH CARE EDUCATION/TRAINING PROGRAM

## 2023-10-04 PROCEDURE — 81001 URINALYSIS AUTO W/SCOPE: CPT | Performed by: STUDENT IN AN ORGANIZED HEALTH CARE EDUCATION/TRAINING PROGRAM

## 2023-10-04 RX ORDER — SULFAMETHOXAZOLE AND TRIMETHOPRIM 800; 160 MG/1; MG/1
1 TABLET ORAL 2 TIMES DAILY
Qty: 10 TABLET | Refills: 0 | Status: SHIPPED | OUTPATIENT
Start: 2023-10-04 | End: 2023-10-04 | Stop reason: SDUPTHER

## 2023-10-04 RX ORDER — SULFAMETHOXAZOLE AND TRIMETHOPRIM 800; 160 MG/1; MG/1
1 TABLET ORAL 2 TIMES DAILY
Qty: 10 TABLET | Refills: 0 | Status: SHIPPED | OUTPATIENT
Start: 2023-10-04 | End: 2023-10-09

## 2023-10-04 ASSESSMENT — PAIN SCALES - GENERAL: PAINLEVEL_OUTOF10: 0 - NO PAIN

## 2023-10-04 ASSESSMENT — PAIN - FUNCTIONAL ASSESSMENT: PAIN_FUNCTIONAL_ASSESSMENT: 0-10

## 2023-10-07 LAB
BACTERIA UR CULT: ABNORMAL
BACTERIA UR CULT: ABNORMAL

## 2023-10-08 ENCOUNTER — TELEPHONE (OUTPATIENT)
Dept: PHARMACY | Facility: HOSPITAL | Age: 63
End: 2023-10-08
Payer: COMMERCIAL

## 2023-10-08 NOTE — PROGRESS NOTES
EDPD Note: Lab/Chart Reviewed    Reviewed Mr./Mrs./Ms. Robel Eastman 's chart regarding a positive urine culture that was taken during their recent emergency room visit. The patient was transferred back to their rehab/LTC facility .Therefore, I have faxed this information to [facility] at fax number [phone] .    Susceptibility data from last 90 days.  Collected Specimen Info Organism Amoxicillin/Clavulanate Ampicillin Ampicillin/Sulbactam Cefazolin Cefazolin (uncomplicated UTIs only) Cefotaxime Ceftriaxone Ciprofloxacin Gentamicin Nitrofurantoin Oxacillin   10/04/23 Urine from Suprapubic Catheter Escherichia coli I R R I S I S R R S      Methicillin Resistant Staphylococcus aureus (MRSA)          S R     Collected Specimen Info Organism Piperacillin/Tazobactam Tobramycin Trimethoprim/Sulfamethoxazole Vancomycin   10/04/23 Urine from Suprapubic Catheter Escherichia coli S R S      Methicillin Resistant Staphylococcus aureus (MRSA)   S S       No further follow up needed from EDPD Team.     Wili PorterD  PGY1 Resident

## 2023-12-18 ENCOUNTER — APPOINTMENT (OUTPATIENT)
Dept: UROLOGY | Facility: CLINIC | Age: 63
End: 2023-12-18
Payer: COMMERCIAL

## 2024-01-01 ENCOUNTER — APPOINTMENT (OUTPATIENT)
Dept: RADIOLOGY | Facility: HOSPITAL | Age: 64
End: 2024-01-01
Payer: OTHER MISCELLANEOUS

## 2024-01-01 ENCOUNTER — APPOINTMENT (OUTPATIENT)
Dept: CARDIOLOGY | Facility: HOSPITAL | Age: 64
End: 2024-01-01
Payer: OTHER MISCELLANEOUS

## 2024-01-01 ENCOUNTER — HOSPITAL ENCOUNTER (INPATIENT)
Facility: HOSPITAL | Age: 64
LOS: 1 days | End: 2024-11-19
Attending: INTERNAL MEDICINE | Admitting: INTERNAL MEDICINE
Payer: OTHER MISCELLANEOUS

## 2024-01-01 ENCOUNTER — HOSPITAL ENCOUNTER (INPATIENT)
Facility: HOSPITAL | Age: 64
LOS: 1 days | Discharge: HOSPICE | End: 2024-11-18
Attending: STUDENT IN AN ORGANIZED HEALTH CARE EDUCATION/TRAINING PROGRAM | Admitting: INTERNAL MEDICINE
Payer: OTHER MISCELLANEOUS

## 2024-01-01 ENCOUNTER — HOSPITAL ENCOUNTER (OUTPATIENT)
Facility: HOSPITAL | Age: 64
Setting detail: OUTPATIENT SURGERY
End: 2024-01-01
Attending: UROLOGY | Admitting: UROLOGY
Payer: OTHER MISCELLANEOUS

## 2024-01-01 VITALS — RESPIRATION RATE: 11 BRPM | HEART RATE: 94 BPM | OXYGEN SATURATION: 83 %

## 2024-01-01 VITALS
TEMPERATURE: 97.9 F | WEIGHT: 216.93 LBS | RESPIRATION RATE: 22 BRPM | SYSTOLIC BLOOD PRESSURE: 93 MMHG | DIASTOLIC BLOOD PRESSURE: 60 MMHG | HEART RATE: 98 BPM | BODY MASS INDEX: 34.86 KG/M2 | OXYGEN SATURATION: 94 % | HEIGHT: 66 IN

## 2024-01-01 DIAGNOSIS — J96.01 ACUTE HYPOXIC RESPIRATORY FAILURE (MULTI): ICD-10-CM

## 2024-01-01 DIAGNOSIS — A41.9 SEPTIC SHOCK (MULTI): Primary | ICD-10-CM

## 2024-01-01 DIAGNOSIS — E87.20 LACTIC ACID ACIDOSIS: ICD-10-CM

## 2024-01-01 DIAGNOSIS — R65.21 SEPTIC SHOCK (MULTI): Primary | ICD-10-CM

## 2024-01-01 DIAGNOSIS — I21.4 NSTEMI (NON-ST ELEVATED MYOCARDIAL INFARCTION) (MULTI): ICD-10-CM

## 2024-01-01 LAB
ABO GROUP (TYPE) IN BLOOD: NORMAL
ABO GROUP (TYPE) IN BLOOD: NORMAL
ALBUMIN SERPL BCP-MCNC: 3.1 G/DL (ref 3.4–5)
ALBUMIN SERPL BCP-MCNC: 3.3 G/DL (ref 3.4–5)
ALBUMIN SERPL BCP-MCNC: 3.3 G/DL (ref 3.4–5)
ALBUMIN SERPL BCP-MCNC: 3.9 G/DL (ref 3.4–5)
ALP SERPL-CCNC: 129 U/L (ref 33–136)
ALP SERPL-CCNC: 79 U/L (ref 33–136)
ALP SERPL-CCNC: 95 U/L (ref 33–136)
ALT SERPL W P-5'-P-CCNC: 300 U/L (ref 10–52)
ALT SERPL W P-5'-P-CCNC: 324 U/L (ref 10–52)
ALT SERPL W P-5'-P-CCNC: 383 U/L (ref 10–52)
AMMONIA PLAS-SCNC: 70 UMOL/L (ref 16–53)
AMPHETAMINES UR QL SCN: ABNORMAL
ANION GAP BLDA CALCULATED.4IONS-SCNC: 19 MMO/L (ref 10–25)
ANION GAP BLDA CALCULATED.4IONS-SCNC: 20 MMO/L (ref 10–25)
ANION GAP BLDA CALCULATED.4IONS-SCNC: 21 MMO/L (ref 10–25)
ANION GAP BLDA CALCULATED.4IONS-SCNC: 21 MMO/L (ref 10–25)
ANION GAP BLDV CALCULATED.4IONS-SCNC: 19 MMOL/L (ref 10–25)
ANION GAP SERPL CALC-SCNC: 16 MMOL/L (ref 10–20)
ANION GAP SERPL CALC-SCNC: 17 MMOL/L (ref 10–20)
ANION GAP SERPL CALC-SCNC: 18 MMOL/L (ref 10–20)
ANION GAP SERPL CALC-SCNC: 20 MMOL/L (ref 10–20)
ANTIBODY SCREEN: NORMAL
AORTIC VALVE PEAK VELOCITY: 1.08 M/S
APAP SERPL-MCNC: <10 UG/ML
APPEARANCE UR: ABNORMAL
ARTERIAL PATENCY WRIST A: YES
AST SERPL W P-5'-P-CCNC: 431 U/L (ref 9–39)
AST SERPL W P-5'-P-CCNC: 453 U/L (ref 9–39)
AST SERPL W P-5'-P-CCNC: 488 U/L (ref 9–39)
ATRIAL RATE: 104 BPM
ATRIAL RATE: 110 BPM
AV PEAK GRADIENT: 5 MMHG
AVA (PEAK VEL): 2.88 CM2
BARBITURATES UR QL SCN: ABNORMAL
BASE EXCESS BLDA CALC-SCNC: -10.2 MMOL/L (ref -2–3)
BASE EXCESS BLDA CALC-SCNC: -10.4 MMOL/L (ref -2–3)
BASE EXCESS BLDA CALC-SCNC: -12 MMOL/L (ref -2–3)
BASE EXCESS BLDA CALC-SCNC: -9.9 MMOL/L (ref -2–3)
BASE EXCESS BLDV CALC-SCNC: -11 MMOL/L (ref -2–3)
BASOPHILS # BLD MANUAL: 0 X10*3/UL (ref 0–0.1)
BASOPHILS NFR BLD MANUAL: 0 %
BENZODIAZ UR QL SCN: ABNORMAL
BILIRUB SERPL-MCNC: 1.7 MG/DL (ref 0–1.2)
BILIRUB SERPL-MCNC: 3.3 MG/DL (ref 0–1.2)
BILIRUB SERPL-MCNC: 3.4 MG/DL (ref 0–1.2)
BILIRUB UR STRIP.AUTO-MCNC: NEGATIVE MG/DL
BODY SURFACE AREA: 2.14 M2
BODY TEMPERATURE: ABNORMAL
BUN SERPL-MCNC: 26 MG/DL (ref 6–23)
BUN SERPL-MCNC: 28 MG/DL (ref 6–23)
BUN SERPL-MCNC: 37 MG/DL (ref 6–23)
BUN SERPL-MCNC: 40 MG/DL (ref 6–23)
BURR CELLS BLD QL SMEAR: ABNORMAL
BZE UR QL SCN: ABNORMAL
CA-I BLDA-SCNC: 1.07 MMOL/L (ref 1.1–1.33)
CA-I BLDA-SCNC: 1.11 MMOL/L (ref 1.1–1.33)
CA-I BLDA-SCNC: 1.17 MMOL/L (ref 1.1–1.33)
CA-I BLDA-SCNC: 1.19 MMOL/L (ref 1.1–1.33)
CA-I BLDV-SCNC: 1.14 MMOL/L (ref 1.1–1.33)
CALCIUM SERPL-MCNC: 7.8 MG/DL (ref 8.6–10.3)
CALCIUM SERPL-MCNC: 8 MG/DL (ref 8.6–10.3)
CALCIUM SERPL-MCNC: 8.4 MG/DL (ref 8.6–10.3)
CALCIUM SERPL-MCNC: 9.1 MG/DL (ref 8.6–10.3)
CANNABINOIDS UR QL SCN: ABNORMAL
CARDIAC TROPONIN I PNL SERPL HS: 128 NG/L (ref 0–20)
CARDIAC TROPONIN I PNL SERPL HS: 145 NG/L (ref 0–20)
CHLORIDE BLDA-SCNC: 95 MMOL/L (ref 98–107)
CHLORIDE BLDA-SCNC: 98 MMOL/L (ref 98–107)
CHLORIDE BLDA-SCNC: 98 MMOL/L (ref 98–107)
CHLORIDE BLDA-SCNC: 99 MMOL/L (ref 98–107)
CHLORIDE BLDV-SCNC: 97 MMOL/L (ref 98–107)
CHLORIDE SERPL-SCNC: 101 MMOL/L (ref 98–107)
CHLORIDE SERPL-SCNC: 96 MMOL/L (ref 98–107)
CHLORIDE SERPL-SCNC: 97 MMOL/L (ref 98–107)
CHLORIDE SERPL-SCNC: 99 MMOL/L (ref 98–107)
CO2 SERPL-SCNC: 18 MMOL/L (ref 21–32)
CO2 SERPL-SCNC: 19 MMOL/L (ref 21–32)
CO2 SERPL-SCNC: 19 MMOL/L (ref 21–32)
CO2 SERPL-SCNC: 22 MMOL/L (ref 21–32)
COLOR UR: ABNORMAL
CREAT SERPL-MCNC: 3.67 MG/DL (ref 0.5–1.3)
CREAT SERPL-MCNC: 3.73 MG/DL (ref 0.5–1.3)
CREAT SERPL-MCNC: 3.76 MG/DL (ref 0.5–1.3)
CREAT SERPL-MCNC: 3.97 MG/DL (ref 0.5–1.3)
CRITICAL CALL TIME: 17
CRITICAL CALL TIME: 17
CRITICAL CALL TIME: 2146
CRITICAL CALL TIME: 258
CRITICAL CALL TIME: 545
CRITICAL CALL TIME: 949
CRITICAL CALLED BY: ABNORMAL
CRITICAL CALLED TO: ABNORMAL
CRITICAL READ BACK: ABNORMAL
EGFRCR SERPLBLD CKD-EPI 2021: 16 ML/MIN/1.73M*2
EGFRCR SERPLBLD CKD-EPI 2021: 17 ML/MIN/1.73M*2
EGFRCR SERPLBLD CKD-EPI 2021: 18 ML/MIN/1.73M*2
EGFRCR SERPLBLD CKD-EPI 2021: 18 ML/MIN/1.73M*2
EJECTION FRACTION APICAL 4 CHAMBER: 16.6
EJECTION FRACTION: 35 %
EOSINOPHIL # BLD MANUAL: 0 X10*3/UL (ref 0–0.7)
EOSINOPHIL NFR BLD MANUAL: 0 %
ERYTHROCYTE [DISTWIDTH] IN BLOOD BY AUTOMATED COUNT: 15.3 % (ref 11.5–14.5)
ERYTHROCYTE [DISTWIDTH] IN BLOOD BY AUTOMATED COUNT: 15.5 % (ref 11.5–14.5)
ETHANOL SERPL-MCNC: <10 MG/DL
FENTANYL+NORFENTANYL UR QL SCN: ABNORMAL
FLUAV RNA RESP QL NAA+PROBE: NOT DETECTED
FLUBV RNA RESP QL NAA+PROBE: NOT DETECTED
GLUCOSE BLD MANUAL STRIP-MCNC: 139 MG/DL (ref 74–99)
GLUCOSE BLD MANUAL STRIP-MCNC: 190 MG/DL (ref 74–99)
GLUCOSE BLD MANUAL STRIP-MCNC: 77 MG/DL (ref 74–99)
GLUCOSE BLDA-MCNC: 105 MG/DL (ref 74–99)
GLUCOSE BLDA-MCNC: 156 MG/DL (ref 74–99)
GLUCOSE BLDA-MCNC: 240 MG/DL (ref 74–99)
GLUCOSE BLDA-MCNC: 92 MG/DL (ref 74–99)
GLUCOSE BLDV-MCNC: 75 MG/DL (ref 74–99)
GLUCOSE SERPL-MCNC: 156 MG/DL (ref 74–99)
GLUCOSE SERPL-MCNC: 261 MG/DL (ref 74–99)
GLUCOSE SERPL-MCNC: 84 MG/DL (ref 74–99)
GLUCOSE SERPL-MCNC: 90 MG/DL (ref 74–99)
GLUCOSE UR STRIP.AUTO-MCNC: NEGATIVE MG/DL
HCO3 BLDA-SCNC: 14.8 MMOL/L (ref 22–26)
HCO3 BLDA-SCNC: 15.3 MMOL/L (ref 22–26)
HCO3 BLDA-SCNC: 15.7 MMOL/L (ref 22–26)
HCO3 BLDA-SCNC: 16 MMOL/L (ref 22–26)
HCO3 BLDV-SCNC: 17.2 MMOL/L (ref 22–26)
HCT VFR BLD AUTO: 43.8 % (ref 41–52)
HCT VFR BLD AUTO: 45.4 % (ref 41–52)
HCT VFR BLD EST: 41 % (ref 41–52)
HCT VFR BLD EST: 44 % (ref 41–52)
HCT VFR BLD EST: 46 % (ref 41–52)
HGB BLD-MCNC: 14.3 G/DL (ref 13.5–17.5)
HGB BLD-MCNC: 14.9 G/DL (ref 13.5–17.5)
HGB BLDA-MCNC: 13.7 G/DL (ref 13.5–17.5)
HGB BLDA-MCNC: 14.7 G/DL (ref 13.5–17.5)
HGB BLDA-MCNC: 14.7 G/DL (ref 13.5–17.5)
HGB BLDA-MCNC: 15.3 G/DL (ref 13.5–17.5)
HGB BLDV-MCNC: 14.5 G/DL (ref 13.5–17.5)
HOLD SPECIMEN: NORMAL
IMM GRANULOCYTES # BLD AUTO: 0.05 X10*3/UL (ref 0–0.7)
IMM GRANULOCYTES NFR BLD AUTO: 0.6 % (ref 0–0.9)
INHALED O2 CONCENTRATION: 60 %
INHALED O2 CONCENTRATION: 90 %
INR PPP: 1.7 (ref 0.9–1.1)
KETONES UR STRIP.AUTO-MCNC: NEGATIVE MG/DL
LACTATE BLDA-SCNC: 5 MMOL/L (ref 0.4–2)
LACTATE BLDA-SCNC: 5.5 MMOL/L (ref 0.4–2)
LACTATE BLDA-SCNC: 5.7 MMOL/L (ref 0.4–2)
LACTATE BLDA-SCNC: 6 MMOL/L (ref 0.4–2)
LACTATE BLDV-SCNC: 7.6 MMOL/L (ref 0.4–2)
LACTATE BLDV-SCNC: 7.6 MMOL/L (ref 0.4–2)
LACTATE SERPL-SCNC: 6.4 MMOL/L (ref 0.4–2)
LACTATE SERPL-SCNC: 6.7 MMOL/L (ref 0.4–2)
LACTATE SERPL-SCNC: 7.2 MMOL/L (ref 0.4–2)
LEFT ATRIUM VOLUME AREA LENGTH INDEX BSA: 18.6 ML/M2
LEFT VENTRICLE INTERNAL DIMENSION DIASTOLE: 5.43 CM (ref 3.5–6)
LEFT VENTRICULAR OUTFLOW TRACT DIAMETER: 2.02 CM
LEGIONELLA AG UR QL: NEGATIVE
LEUKOCYTE ESTERASE UR QL STRIP.AUTO: ABNORMAL
LV EJECTION FRACTION BIPLANE: 20 %
LYMPHOCYTES # BLD MANUAL: 0.32 X10*3/UL (ref 1.2–4.8)
LYMPHOCYTES NFR BLD MANUAL: 4 %
MAGNESIUM SERPL-MCNC: 1.42 MG/DL (ref 1.6–2.4)
MCH RBC QN AUTO: 28.1 PG (ref 26–34)
MCH RBC QN AUTO: 28.3 PG (ref 26–34)
MCHC RBC AUTO-ENTMCNC: 32.6 G/DL (ref 32–36)
MCHC RBC AUTO-ENTMCNC: 32.8 G/DL (ref 32–36)
MCV RBC AUTO: 86 FL (ref 80–100)
MCV RBC AUTO: 86 FL (ref 80–100)
METAMYELOCYTES # BLD MANUAL: 0.48 X10*3/UL
METAMYELOCYTES NFR BLD MANUAL: 6 %
METHADONE UR QL SCN: ABNORMAL
MITRAL VALVE E/A RATIO: 0.79
MONOCYTES # BLD MANUAL: 0.24 X10*3/UL (ref 0.1–1)
MONOCYTES NFR BLD MANUAL: 3 %
MRSA DNA SPEC QL NAA+PROBE: DETECTED
MYELOCYTES # BLD MANUAL: 0.16 X10*3/UL
MYELOCYTES NFR BLD MANUAL: 2 %
NEUTROPHILS # BLD MANUAL: 6.8 X10*3/UL (ref 1.2–7.7)
NEUTS BAND # BLD MANUAL: 2.72 X10*3/UL (ref 0–0.7)
NEUTS BAND NFR BLD MANUAL: 34 %
NEUTS SEG # BLD MANUAL: 4.08 X10*3/UL (ref 1.2–7)
NEUTS SEG NFR BLD MANUAL: 51 %
NITRITE UR QL STRIP.AUTO: NEGATIVE
NRBC BLD-RTO: 0 /100 WBCS (ref 0–0)
NRBC BLD-RTO: 0.2 /100 WBCS (ref 0–0)
OPIATES UR QL SCN: ABNORMAL
OXYCODONE+OXYMORPHONE UR QL SCN: ABNORMAL
OXYHGB MFR BLDA: 85.1 % (ref 94–98)
OXYHGB MFR BLDA: 91.2 % (ref 94–98)
OXYHGB MFR BLDA: 93.5 % (ref 94–98)
OXYHGB MFR BLDA: 93.7 % (ref 94–98)
OXYHGB MFR BLDV: 53.2 % (ref 45–75)
P AXIS: 21 DEGREES
P OFFSET: 186 MS
P ONSET: 141 MS
PCO2 BLDA: 30 MM HG (ref 38–42)
PCO2 BLDA: 31 MM HG (ref 38–42)
PCO2 BLDA: 35 MM HG (ref 38–42)
PCO2 BLDA: 43 MM HG (ref 38–42)
PCO2 BLDV: 46 MM HG (ref 41–51)
PCP UR QL SCN: ABNORMAL
PEEP CMH2O: 8 CM H2O
PH BLDA: 7.18 PH (ref 7.38–7.42)
PH BLDA: 7.26 PH (ref 7.38–7.42)
PH BLDA: 7.3 PH (ref 7.38–7.42)
PH BLDA: 7.3 PH (ref 7.38–7.42)
PH BLDV: 7.18 PH (ref 7.33–7.43)
PH UR STRIP.AUTO: 7 [PH]
PHOSPHATE SERPL-MCNC: 3 MG/DL (ref 2.5–4.9)
PHOSPHATE SERPL-MCNC: 4.8 MG/DL (ref 2.5–4.9)
PLATELET # BLD AUTO: 127 X10*3/UL (ref 150–450)
PLATELET # BLD AUTO: 143 X10*3/UL (ref 150–450)
PO2 BLDA: 55 MM HG (ref 85–95)
PO2 BLDA: 68 MM HG (ref 85–95)
PO2 BLDA: 73 MM HG (ref 85–95)
PO2 BLDA: 84 MM HG (ref 85–95)
PO2 BLDV: 35 MM HG (ref 35–45)
POTASSIUM BLDA-SCNC: 3.6 MMOL/L (ref 3.5–5.3)
POTASSIUM BLDA-SCNC: 4.1 MMOL/L (ref 3.5–5.3)
POTASSIUM BLDA-SCNC: 4.3 MMOL/L (ref 3.5–5.3)
POTASSIUM BLDA-SCNC: 4.5 MMOL/L (ref 3.5–5.3)
POTASSIUM BLDV-SCNC: 5 MMOL/L (ref 3.5–5.3)
POTASSIUM SERPL-SCNC: 3.5 MMOL/L (ref 3.5–5.3)
POTASSIUM SERPL-SCNC: 3.8 MMOL/L (ref 3.5–5.3)
POTASSIUM SERPL-SCNC: 3.8 MMOL/L (ref 3.5–5.3)
POTASSIUM SERPL-SCNC: 4.1 MMOL/L (ref 3.5–5.3)
PR INTERVAL: 168 MS
PROCALCITONIN SERPL-MCNC: 216.91 NG/ML
PROT SERPL-MCNC: 5.6 G/DL (ref 6.4–8.2)
PROT SERPL-MCNC: 5.9 G/DL (ref 6.4–8.2)
PROT SERPL-MCNC: 7.1 G/DL (ref 6.4–8.2)
PROT UR STRIP.AUTO-MCNC: ABNORMAL MG/DL
PROTHROMBIN TIME: 18.7 SECONDS (ref 9.8–12.8)
Q ONSET: 224 MS
Q ONSET: 225 MS
QRS COUNT: 17 BEATS
QRS COUNT: 18 BEATS
QRS DURATION: 78 MS
QRS DURATION: 78 MS
QT INTERVAL: 362 MS
QT INTERVAL: 504 MS
QTC CALCULATION(BAZETT): 489 MS
QTC CALCULATION(BAZETT): 662 MS
QTC FREDERICIA: 443 MS
QTC FREDERICIA: 605 MS
R AXIS: 11 DEGREES
R AXIS: 25 DEGREES
RBC # BLD AUTO: 5.09 X10*6/UL (ref 4.5–5.9)
RBC # BLD AUTO: 5.26 X10*6/UL (ref 4.5–5.9)
RBC # UR STRIP.AUTO: ABNORMAL /UL
RBC #/AREA URNS AUTO: >20 /HPF
RBC MORPH BLD: ABNORMAL
RH FACTOR (ANTIGEN D): NORMAL
RH FACTOR (ANTIGEN D): NORMAL
RIGHT VENTRICLE PEAK SYSTOLIC PRESSURE: 28.6 MMHG
S PNEUM AG UR QL: NEGATIVE
SALICYLATES SERPL-MCNC: <3 MG/DL
SAO2 % BLDA: 87 % (ref 94–100)
SAO2 % BLDA: 93 % (ref 94–100)
SAO2 % BLDA: 95 % (ref 94–100)
SAO2 % BLDA: 96 % (ref 94–100)
SAO2 % BLDV: 54 % (ref 45–75)
SARS-COV-2 RNA RESP QL NAA+PROBE: NOT DETECTED
SITE OF ARTERIAL PUNCTURE: ABNORMAL
SODIUM BLDA-SCNC: 127 MMOL/L (ref 136–145)
SODIUM BLDA-SCNC: 129 MMOL/L (ref 136–145)
SODIUM BLDA-SCNC: 130 MMOL/L (ref 136–145)
SODIUM BLDA-SCNC: 130 MMOL/L (ref 136–145)
SODIUM BLDV-SCNC: 128 MMOL/L (ref 136–145)
SODIUM SERPL-SCNC: 127 MMOL/L (ref 136–145)
SODIUM SERPL-SCNC: 131 MMOL/L (ref 136–145)
SODIUM SERPL-SCNC: 133 MMOL/L (ref 136–145)
SODIUM SERPL-SCNC: 135 MMOL/L (ref 136–145)
SP GR UR STRIP.AUTO: 1.02
SQUAMOUS #/AREA URNS AUTO: ABNORMAL /HPF
T AXIS: 53 DEGREES
T AXIS: 68 DEGREES
T OFFSET: 406 MS
T OFFSET: 476 MS
TIDAL VOLUME: 500 ML
TOTAL CELLS COUNTED BLD: 100
UROBILINOGEN UR STRIP.AUTO-MCNC: ABNORMAL MG/DL
VENTILATOR MODE: ABNORMAL
VENTILATOR MODE: ABNORMAL
VENTILATOR RATE: 20 BPM
VENTRICULAR RATE: 104 BPM
VENTRICULAR RATE: 110 BPM
WBC # BLD AUTO: 10.2 X10*3/UL (ref 4.4–11.3)
WBC # BLD AUTO: 8 X10*3/UL (ref 4.4–11.3)
WBC #/AREA URNS AUTO: ABNORMAL /HPF

## 2024-01-01 PROCEDURE — 85018 HEMOGLOBIN: CPT | Performed by: STUDENT IN AN ORGANIZED HEALTH CARE EDUCATION/TRAINING PROGRAM

## 2024-01-01 PROCEDURE — 2500000004 HC RX 250 GENERAL PHARMACY W/ HCPCS (ALT 636 FOR OP/ED): Performed by: INTERNAL MEDICINE

## 2024-01-01 PROCEDURE — 84132 ASSAY OF SERUM POTASSIUM: CPT

## 2024-01-01 PROCEDURE — 86900 BLOOD TYPING SEROLOGIC ABO: CPT

## 2024-01-01 PROCEDURE — 5A09357 ASSISTANCE WITH RESPIRATORY VENTILATION, LESS THAN 24 CONSECUTIVE HOURS, CONTINUOUS POSITIVE AIRWAY PRESSURE: ICD-10-PCS | Performed by: INTERNAL MEDICINE

## 2024-01-01 PROCEDURE — 2500000001 HC RX 250 WO HCPCS SELF ADMINISTERED DRUGS (ALT 637 FOR MEDICARE OP)

## 2024-01-01 PROCEDURE — 2500000002 HC RX 250 W HCPCS SELF ADMINISTERED DRUGS (ALT 637 FOR MEDICARE OP, ALT 636 FOR OP/ED)

## 2024-01-01 PROCEDURE — 87641 MR-STAPH DNA AMP PROBE: CPT

## 2024-01-01 PROCEDURE — 99291 CRITICAL CARE FIRST HOUR: CPT

## 2024-01-01 PROCEDURE — 99292 CRITICAL CARE ADDL 30 MIN: CPT | Performed by: STUDENT IN AN ORGANIZED HEALTH CARE EDUCATION/TRAINING PROGRAM

## 2024-01-01 PROCEDURE — 36415 COLL VENOUS BLD VENIPUNCTURE: CPT | Performed by: EMERGENCY MEDICINE

## 2024-01-01 PROCEDURE — 85027 COMPLETE CBC AUTOMATED: CPT | Performed by: EMERGENCY MEDICINE

## 2024-01-01 PROCEDURE — 2500000005 HC RX 250 GENERAL PHARMACY W/O HCPCS

## 2024-01-01 PROCEDURE — 99291 CRITICAL CARE FIRST HOUR: CPT | Performed by: INTERNAL MEDICINE

## 2024-01-01 PROCEDURE — 83605 ASSAY OF LACTIC ACID: CPT | Performed by: EMERGENCY MEDICINE

## 2024-01-01 PROCEDURE — 2500000004 HC RX 250 GENERAL PHARMACY W/ HCPCS (ALT 636 FOR OP/ED)

## 2024-01-01 PROCEDURE — 36620 INSERTION CATHETER ARTERY: CPT

## 2024-01-01 PROCEDURE — 3E033XZ INTRODUCTION OF VASOPRESSOR INTO PERIPHERAL VEIN, PERCUTANEOUS APPROACH: ICD-10-PCS | Performed by: INTERNAL MEDICINE

## 2024-01-01 PROCEDURE — P9016 RBC LEUKOCYTES REDUCED: HCPCS

## 2024-01-01 PROCEDURE — 82330 ASSAY OF CALCIUM: CPT

## 2024-01-01 PROCEDURE — 94660 CPAP INITIATION&MGMT: CPT

## 2024-01-01 PROCEDURE — 0BH17EZ INSERTION OF ENDOTRACHEAL AIRWAY INTO TRACHEA, VIA NATURAL OR ARTIFICIAL OPENING: ICD-10-PCS

## 2024-01-01 PROCEDURE — 84132 ASSAY OF SERUM POTASSIUM: CPT | Performed by: EMERGENCY MEDICINE

## 2024-01-01 PROCEDURE — 2500000002 HC RX 250 W HCPCS SELF ADMINISTERED DRUGS (ALT 637 FOR MEDICARE OP, ALT 636 FOR OP/ED): Performed by: INTERNAL MEDICINE

## 2024-01-01 PROCEDURE — 5A1935Z RESPIRATORY VENTILATION, LESS THAN 24 CONSECUTIVE HOURS: ICD-10-PCS

## 2024-01-01 PROCEDURE — 84295 ASSAY OF SERUM SODIUM: CPT | Performed by: STUDENT IN AN ORGANIZED HEALTH CARE EDUCATION/TRAINING PROGRAM

## 2024-01-01 PROCEDURE — 37799 UNLISTED PX VASCULAR SURGERY: CPT | Performed by: INTERNAL MEDICINE

## 2024-01-01 PROCEDURE — 36430 TRANSFUSION BLD/BLD COMPNT: CPT

## 2024-01-01 PROCEDURE — 71045 X-RAY EXAM CHEST 1 VIEW: CPT | Performed by: RADIOLOGY

## 2024-01-01 PROCEDURE — 96374 THER/PROPH/DIAG INJ IV PUSH: CPT | Mod: 59

## 2024-01-01 PROCEDURE — 2020000001 HC ICU ROOM DAILY

## 2024-01-01 PROCEDURE — 36415 COLL VENOUS BLD VENIPUNCTURE: CPT

## 2024-01-01 PROCEDURE — C8929 TTE W OR WO FOL WCON,DOPPLER: HCPCS

## 2024-01-01 PROCEDURE — 2500000004 HC RX 250 GENERAL PHARMACY W/ HCPCS (ALT 636 FOR OP/ED): Performed by: EMERGENCY MEDICINE

## 2024-01-01 PROCEDURE — 84295 ASSAY OF SERUM SODIUM: CPT

## 2024-01-01 PROCEDURE — 86901 BLOOD TYPING SEROLOGIC RH(D): CPT

## 2024-01-01 PROCEDURE — 86920 COMPATIBILITY TEST SPIN: CPT

## 2024-01-01 PROCEDURE — 84132 ASSAY OF SERUM POTASSIUM: CPT | Performed by: STUDENT IN AN ORGANIZED HEALTH CARE EDUCATION/TRAINING PROGRAM

## 2024-01-01 PROCEDURE — 36556 INSERT NON-TUNNEL CV CATH: CPT

## 2024-01-01 PROCEDURE — 99291 CRITICAL CARE FIRST HOUR: CPT | Performed by: STUDENT IN AN ORGANIZED HEALTH CARE EDUCATION/TRAINING PROGRAM

## 2024-01-01 PROCEDURE — 85610 PROTHROMBIN TIME: CPT | Performed by: STUDENT IN AN ORGANIZED HEALTH CARE EDUCATION/TRAINING PROGRAM

## 2024-01-01 PROCEDURE — 94640 AIRWAY INHALATION TREATMENT: CPT

## 2024-01-01 PROCEDURE — 84132 ASSAY OF SERUM POTASSIUM: CPT | Performed by: INTERNAL MEDICINE

## 2024-01-01 PROCEDURE — 87636 SARSCOV2 & INF A&B AMP PRB: CPT

## 2024-01-01 PROCEDURE — 71275 CT ANGIOGRAPHY CHEST: CPT | Performed by: RADIOLOGY

## 2024-01-01 PROCEDURE — 93306 TTE W/DOPPLER COMPLETE: CPT | Performed by: INTERNAL MEDICINE

## 2024-01-01 PROCEDURE — 93005 ELECTROCARDIOGRAM TRACING: CPT

## 2024-01-01 PROCEDURE — 84295 ASSAY OF SERUM SODIUM: CPT | Performed by: INTERNAL MEDICINE

## 2024-01-01 PROCEDURE — 71045 X-RAY EXAM CHEST 1 VIEW: CPT | Performed by: STUDENT IN AN ORGANIZED HEALTH CARE EDUCATION/TRAINING PROGRAM

## 2024-01-01 PROCEDURE — 82947 ASSAY GLUCOSE BLOOD QUANT: CPT

## 2024-01-01 PROCEDURE — 81001 URINALYSIS AUTO W/SCOPE: CPT | Performed by: EMERGENCY MEDICINE

## 2024-01-01 PROCEDURE — 80069 RENAL FUNCTION PANEL: CPT | Mod: CCI | Performed by: EMERGENCY MEDICINE

## 2024-01-01 PROCEDURE — 31500 INSERT EMERGENCY AIRWAY: CPT

## 2024-01-01 PROCEDURE — 37799 UNLISTED PX VASCULAR SURGERY: CPT

## 2024-01-01 PROCEDURE — 96365 THER/PROPH/DIAG IV INF INIT: CPT

## 2024-01-01 PROCEDURE — 83605 ASSAY OF LACTIC ACID: CPT

## 2024-01-01 PROCEDURE — 80307 DRUG TEST PRSMV CHEM ANLYZR: CPT

## 2024-01-01 PROCEDURE — 71275 CT ANGIOGRAPHY CHEST: CPT

## 2024-01-01 PROCEDURE — 84145 PROCALCITONIN (PCT): CPT | Mod: STJLAB

## 2024-01-01 PROCEDURE — 74174 CTA ABD&PLVS W/CONTRAST: CPT | Performed by: RADIOLOGY

## 2024-01-01 PROCEDURE — 94002 VENT MGMT INPAT INIT DAY: CPT

## 2024-01-01 PROCEDURE — 99292 CRITICAL CARE ADDL 30 MIN: CPT | Performed by: INTERNAL MEDICINE

## 2024-01-01 PROCEDURE — 80143 DRUG ASSAY ACETAMINOPHEN: CPT | Performed by: STUDENT IN AN ORGANIZED HEALTH CARE EDUCATION/TRAINING PROGRAM

## 2024-01-01 PROCEDURE — 74018 RADEX ABDOMEN 1 VIEW: CPT

## 2024-01-01 PROCEDURE — 2550000001 HC RX 255 CONTRASTS: Performed by: STUDENT IN AN ORGANIZED HEALTH CARE EDUCATION/TRAINING PROGRAM

## 2024-01-01 PROCEDURE — 80320 DRUG SCREEN QUANTALCOHOLS: CPT | Performed by: STUDENT IN AN ORGANIZED HEALTH CARE EDUCATION/TRAINING PROGRAM

## 2024-01-01 PROCEDURE — 71045 X-RAY EXAM CHEST 1 VIEW: CPT

## 2024-01-01 PROCEDURE — 36600 WITHDRAWAL OF ARTERIAL BLOOD: CPT

## 2024-01-01 PROCEDURE — 96375 TX/PRO/DX INJ NEW DRUG ADDON: CPT | Mod: 59

## 2024-01-01 PROCEDURE — 84484 ASSAY OF TROPONIN QUANT: CPT | Performed by: EMERGENCY MEDICINE

## 2024-01-01 PROCEDURE — 84484 ASSAY OF TROPONIN QUANT: CPT | Performed by: STUDENT IN AN ORGANIZED HEALTH CARE EDUCATION/TRAINING PROGRAM

## 2024-01-01 PROCEDURE — 82140 ASSAY OF AMMONIA: CPT

## 2024-01-01 PROCEDURE — 83735 ASSAY OF MAGNESIUM: CPT

## 2024-01-01 PROCEDURE — 84100 ASSAY OF PHOSPHORUS: CPT

## 2024-01-01 PROCEDURE — 1150000001 HC HOSPICE PRIVATE ROOM DAILY

## 2024-01-01 PROCEDURE — 87449 NOS EACH ORGANISM AG IA: CPT | Mod: STJLAB

## 2024-01-01 PROCEDURE — 82435 ASSAY OF BLOOD CHLORIDE: CPT | Performed by: INTERNAL MEDICINE

## 2024-01-01 PROCEDURE — 93010 ELECTROCARDIOGRAM REPORT: CPT | Performed by: INTERNAL MEDICINE

## 2024-01-01 PROCEDURE — 85027 COMPLETE CBC AUTOMATED: CPT

## 2024-01-01 PROCEDURE — 86850 RBC ANTIBODY SCREEN: CPT

## 2024-01-01 PROCEDURE — 74018 RADEX ABDOMEN 1 VIEW: CPT | Performed by: RADIOLOGY

## 2024-01-01 PROCEDURE — 85007 BL SMEAR W/DIFF WBC COUNT: CPT | Performed by: EMERGENCY MEDICINE

## 2024-01-01 PROCEDURE — 96367 TX/PROPH/DG ADDL SEQ IV INF: CPT

## 2024-01-01 PROCEDURE — 87899 AGENT NOS ASSAY W/OPTIC: CPT | Mod: STJLAB

## 2024-01-01 PROCEDURE — 87040 BLOOD CULTURE FOR BACTERIA: CPT | Mod: STJLAB | Performed by: EMERGENCY MEDICINE

## 2024-01-01 PROCEDURE — 99222 1ST HOSP IP/OBS MODERATE 55: CPT

## 2024-01-01 PROCEDURE — 87086 URINE CULTURE/COLONY COUNT: CPT | Mod: STJLAB | Performed by: EMERGENCY MEDICINE

## 2024-01-01 PROCEDURE — 2500000001 HC RX 250 WO HCPCS SELF ADMINISTERED DRUGS (ALT 637 FOR MEDICARE OP): Performed by: INTERNAL MEDICINE

## 2024-01-01 PROCEDURE — 87640 STAPH A DNA AMP PROBE: CPT

## 2024-01-01 PROCEDURE — 02HV33Z INSERTION OF INFUSION DEVICE INTO SUPERIOR VENA CAVA, PERCUTANEOUS APPROACH: ICD-10-PCS

## 2024-01-01 PROCEDURE — 94681 O2 UPTK CO2 OUTP % O2 XTRC: CPT

## 2024-01-01 RX ORDER — NOREPINEPHRINE BITARTRATE/D5W 8 MG/250ML
PLASTIC BAG, INJECTION (ML) INTRAVENOUS
Status: COMPLETED
Start: 2024-01-01 | End: 2024-01-01

## 2024-01-01 RX ORDER — FENTANYL CITRATE 50 UG/ML
50 INJECTION, SOLUTION INTRAMUSCULAR; INTRAVENOUS ONCE
Status: DISCONTINUED | OUTPATIENT
Start: 2024-01-01 | End: 2024-01-01

## 2024-01-01 RX ORDER — HEPARIN SODIUM 5000 [USP'U]/ML
5000 INJECTION, SOLUTION INTRAVENOUS; SUBCUTANEOUS EVERY 8 HOURS
Status: DISCONTINUED | OUTPATIENT
Start: 2024-01-01 | End: 2024-01-01 | Stop reason: HOSPADM

## 2024-01-01 RX ORDER — BRIMONIDINE TARTRATE 1.5 MG/ML
1 SOLUTION/ DROPS OPHTHALMIC 2 TIMES DAILY
COMMUNITY

## 2024-01-01 RX ORDER — FENTANYL CITRATE 50 UG/ML
25 INJECTION, SOLUTION INTRAMUSCULAR; INTRAVENOUS ONCE
Status: COMPLETED | OUTPATIENT
Start: 2024-01-01 | End: 2024-01-01

## 2024-01-01 RX ORDER — LORAZEPAM 2 MG/ML
0.5 INJECTION INTRAMUSCULAR EVERY 4 HOURS PRN
Status: DISCONTINUED | OUTPATIENT
Start: 2024-01-01 | End: 2024-11-19 | Stop reason: HOSPADM

## 2024-01-01 RX ORDER — DOCUSATE SODIUM 50 MG/5ML
100 LIQUID ORAL 2 TIMES DAILY
Status: DISCONTINUED | OUTPATIENT
Start: 2024-01-01 | End: 2024-01-01 | Stop reason: HOSPADM

## 2024-01-01 RX ORDER — TRANEXAMIC ACID 10 MG/ML
1000 INJECTION, SOLUTION INTRAVENOUS ONCE
Status: COMPLETED | OUTPATIENT
Start: 2024-01-01 | End: 2024-01-01

## 2024-01-01 RX ORDER — LORAZEPAM 2 MG/ML
0.5 INJECTION INTRAMUSCULAR EVERY 4 HOURS PRN
Status: DISCONTINUED | OUTPATIENT
Start: 2024-01-01 | End: 2024-01-01 | Stop reason: HOSPADM

## 2024-01-01 RX ORDER — AMLODIPINE BESYLATE 5 MG/1
5 TABLET ORAL DAILY
COMMUNITY

## 2024-01-01 RX ORDER — CEFEPIME 1 G/50ML
1 INJECTION, SOLUTION INTRAVENOUS EVERY 12 HOURS
Status: DISCONTINUED | OUTPATIENT
Start: 2024-01-01 | End: 2024-01-01

## 2024-01-01 RX ORDER — POLYETHYLENE GLYCOL 3350 17 G/17G
17 POWDER, FOR SOLUTION ORAL DAILY
Status: DISCONTINUED | OUTPATIENT
Start: 2024-01-01 | End: 2024-01-01 | Stop reason: HOSPADM

## 2024-01-01 RX ORDER — ASPIRIN 81 MG/1
81 TABLET ORAL DAILY
COMMUNITY

## 2024-01-01 RX ORDER — HALOPERIDOL 5 MG/ML
1 INJECTION INTRAMUSCULAR EVERY 4 HOURS PRN
Status: DISCONTINUED | OUTPATIENT
Start: 2024-01-01 | End: 2024-01-01 | Stop reason: HOSPADM

## 2024-01-01 RX ORDER — FENTANYL CITRATE-0.9 % NACL/PF 10 MCG/ML
0-300 PLASTIC BAG, INJECTION (ML) INTRAVENOUS CONTINUOUS
Status: DISCONTINUED | OUTPATIENT
Start: 2024-01-01 | End: 2024-01-01 | Stop reason: HOSPADM

## 2024-01-01 RX ORDER — GLYCOPYRROLATE 0.2 MG/ML
0.2 INJECTION INTRAMUSCULAR; INTRAVENOUS EVERY 4 HOURS PRN
Status: DISCONTINUED | OUTPATIENT
Start: 2024-01-01 | End: 2024-01-01 | Stop reason: HOSPADM

## 2024-01-01 RX ORDER — HYDRALAZINE HYDROCHLORIDE 25 MG/1
25 TABLET, FILM COATED ORAL 2 TIMES DAILY
COMMUNITY

## 2024-01-01 RX ORDER — ASCORBIC ACID 500 MG
500 TABLET ORAL DAILY
COMMUNITY

## 2024-01-01 RX ORDER — FLUTICASONE PROPIONATE 50 MCG
1 SPRAY, SUSPENSION (ML) NASAL DAILY
COMMUNITY

## 2024-01-01 RX ORDER — DEXMEDETOMIDINE HYDROCHLORIDE 4 UG/ML
0-1.5 INJECTION, SOLUTION INTRAVENOUS CONTINUOUS
Status: DISCONTINUED | OUTPATIENT
Start: 2024-01-01 | End: 2024-01-01 | Stop reason: HOSPADM

## 2024-01-01 RX ORDER — ACETAMINOPHEN 650 MG/1
650 SUPPOSITORY RECTAL EVERY 4 HOURS PRN
Status: DISCONTINUED | OUTPATIENT
Start: 2024-01-01 | End: 2024-01-01 | Stop reason: HOSPADM

## 2024-01-01 RX ORDER — DEXTROSE 50 % IN WATER (D50W) INTRAVENOUS SYRINGE
25
Status: DISCONTINUED | OUTPATIENT
Start: 2024-01-01 | End: 2024-01-01 | Stop reason: HOSPADM

## 2024-01-01 RX ORDER — ETOMIDATE 2 MG/ML
15 INJECTION INTRAVENOUS ONCE
Status: DISCONTINUED | OUTPATIENT
Start: 2024-01-01 | End: 2024-01-01 | Stop reason: HOSPADM

## 2024-01-01 RX ORDER — ATORVASTATIN CALCIUM 40 MG/1
40 TABLET, FILM COATED ORAL NIGHTLY
COMMUNITY

## 2024-01-01 RX ORDER — NOREPINEPHRINE BITARTRATE/D5W 8 MG/250ML
0-1 PLASTIC BAG, INJECTION (ML) INTRAVENOUS CONTINUOUS
Status: DISCONTINUED | OUTPATIENT
Start: 2024-01-01 | End: 2024-01-01 | Stop reason: HOSPADM

## 2024-01-01 RX ORDER — POLYETHYLENE GLYCOL 3350 17 G/17G
17 POWDER, FOR SOLUTION ORAL DAILY
COMMUNITY

## 2024-01-01 RX ORDER — ACETAMINOPHEN 325 MG/1
650 TABLET ORAL EVERY 6 HOURS PRN
COMMUNITY

## 2024-01-01 RX ORDER — LATANOPROST 50 UG/ML
1 SOLUTION/ DROPS OPHTHALMIC NIGHTLY
COMMUNITY

## 2024-01-01 RX ORDER — VANCOMYCIN 2 GRAM/500 ML IN 0.9 % SODIUM CHLORIDE INTRAVENOUS
2 ONCE
Status: COMPLETED | OUTPATIENT
Start: 2024-01-01 | End: 2024-01-01

## 2024-01-01 RX ORDER — IPRATROPIUM BROMIDE AND ALBUTEROL SULFATE 2.5; .5 MG/3ML; MG/3ML
3 SOLUTION RESPIRATORY (INHALATION)
Status: DISCONTINUED | OUTPATIENT
Start: 2024-01-01 | End: 2024-01-01 | Stop reason: HOSPADM

## 2024-01-01 RX ORDER — SALINE NASAL SPRAY 1.5 OZ
1 SOLUTION NASAL EVERY 6 HOURS PRN
COMMUNITY

## 2024-01-01 RX ORDER — PANTOPRAZOLE SODIUM 40 MG/1
40 TABLET, DELAYED RELEASE ORAL
COMMUNITY

## 2024-01-01 RX ORDER — MORPHINE SULFATE 4 MG/ML
4 INJECTION, SOLUTION INTRAMUSCULAR; INTRAVENOUS
Status: DISCONTINUED | OUTPATIENT
Start: 2024-01-01 | End: 2024-01-01 | Stop reason: HOSPADM

## 2024-01-01 RX ORDER — GABAPENTIN 800 MG/1
800 TABLET ORAL 2 TIMES DAILY
COMMUNITY

## 2024-01-01 RX ORDER — ROCURONIUM BROMIDE 50 MG/5 ML
40 SYRINGE (ML) INTRAVENOUS ONCE
Status: DISCONTINUED | OUTPATIENT
Start: 2024-01-01 | End: 2024-01-01 | Stop reason: HOSPADM

## 2024-01-01 RX ORDER — MORPHINE SULFATE 2 MG/ML
2 INJECTION, SOLUTION INTRAMUSCULAR; INTRAVENOUS
Status: DISCONTINUED | OUTPATIENT
Start: 2024-01-01 | End: 2024-11-19 | Stop reason: HOSPADM

## 2024-01-01 RX ORDER — GLYCOPYRROLATE 0.2 MG/ML
0.2 INJECTION INTRAMUSCULAR; INTRAVENOUS EVERY 4 HOURS PRN
Status: DISCONTINUED | OUTPATIENT
Start: 2024-01-01 | End: 2024-11-19 | Stop reason: HOSPADM

## 2024-01-01 RX ORDER — SUCCINYLCHOLINE/SOD CL,ISO/PF 100 MG/5ML
1.5 SYRINGE (ML) INTRAVENOUS ONCE
Status: DISCONTINUED | OUTPATIENT
Start: 2024-01-01 | End: 2024-01-01

## 2024-01-01 RX ORDER — DORZOLAMIDE HCL 20 MG/ML
1 SOLUTION/ DROPS OPHTHALMIC 2 TIMES DAILY
COMMUNITY

## 2024-01-01 RX ORDER — MEROPENEM 500 MG/1
500 INJECTION, POWDER, FOR SOLUTION INTRAVENOUS EVERY 12 HOURS
Status: DISCONTINUED | OUTPATIENT
Start: 2024-01-01 | End: 2024-01-01 | Stop reason: HOSPADM

## 2024-01-01 RX ORDER — FLUDROCORTISONE ACETATE 0.1 MG/1
0.1 TABLET ORAL DAILY
Status: DISCONTINUED | OUTPATIENT
Start: 2024-01-01 | End: 2024-01-01 | Stop reason: HOSPADM

## 2024-01-01 RX ORDER — BACLOFEN 10 MG/1
10 TABLET ORAL NIGHTLY
COMMUNITY

## 2024-01-01 RX ORDER — MORPHINE SULFATE 4 MG/ML
4 INJECTION, SOLUTION INTRAMUSCULAR; INTRAVENOUS
Status: DISCONTINUED | OUTPATIENT
Start: 2024-01-01 | End: 2024-11-19 | Stop reason: HOSPADM

## 2024-01-01 RX ORDER — DULOXETIN HYDROCHLORIDE 60 MG/1
60 CAPSULE, DELAYED RELEASE ORAL DAILY
COMMUNITY

## 2024-01-01 RX ORDER — FLUOCINOLONE ACETONIDE 0.1 MG/ML
1 SOLUTION TOPICAL EVERY 12 HOURS PRN
COMMUNITY

## 2024-01-01 RX ORDER — DROPERIDOL 2.5 MG/ML
INJECTION, SOLUTION INTRAMUSCULAR; INTRAVENOUS
Status: COMPLETED
Start: 2024-01-01 | End: 2024-01-01

## 2024-01-01 RX ORDER — NITROFURANTOIN MACROCRYSTALS 50 MG/1
1 CAPSULE ORAL DAILY
COMMUNITY

## 2024-01-01 RX ORDER — BISMUTH SUBSALICYLATE 262 MG
1 TABLET,CHEWABLE ORAL DAILY
COMMUNITY

## 2024-01-01 RX ORDER — KETOROLAC TROMETHAMINE 15 MG/ML
15 INJECTION, SOLUTION INTRAMUSCULAR; INTRAVENOUS EVERY 6 HOURS PRN
Status: DISCONTINUED | OUTPATIENT
Start: 2024-01-01 | End: 2024-01-01 | Stop reason: HOSPADM

## 2024-01-01 RX ORDER — CALCIUM GLUCONATE 20 MG/ML
2 INJECTION, SOLUTION INTRAVENOUS ONCE
Status: COMPLETED | OUTPATIENT
Start: 2024-01-01 | End: 2024-01-01

## 2024-01-01 RX ORDER — INSULIN LISPRO 100 [IU]/ML
0-5 INJECTION, SOLUTION INTRAVENOUS; SUBCUTANEOUS EVERY 4 HOURS
Status: DISCONTINUED | OUTPATIENT
Start: 2024-01-01 | End: 2024-01-01 | Stop reason: HOSPADM

## 2024-01-01 RX ORDER — SODIUM BICARBONATE 1 MEQ/ML
50 SYRINGE (ML) INTRAVENOUS ONCE
Status: COMPLETED | OUTPATIENT
Start: 2024-01-01 | End: 2024-01-01

## 2024-01-01 RX ORDER — LACTULOSE 10 G/15ML
20 SOLUTION ORAL 3 TIMES DAILY
Status: DISCONTINUED | OUTPATIENT
Start: 2024-01-01 | End: 2024-01-01 | Stop reason: HOSPADM

## 2024-01-01 RX ORDER — FENTANYL CITRATE 50 UG/ML
25 INJECTION, SOLUTION INTRAMUSCULAR; INTRAVENOUS EVERY 10 MIN PRN
Status: DISCONTINUED | OUTPATIENT
Start: 2024-01-01 | End: 2024-01-01 | Stop reason: HOSPADM

## 2024-01-01 RX ORDER — MORPHINE SULFATE 2 MG/ML
2 INJECTION, SOLUTION INTRAMUSCULAR; INTRAVENOUS EVERY 4 HOURS
Status: DISCONTINUED | OUTPATIENT
Start: 2024-01-01 | End: 2024-11-19 | Stop reason: HOSPADM

## 2024-01-01 RX ORDER — IPRATROPIUM BROMIDE AND ALBUTEROL SULFATE 2.5; .5 MG/3ML; MG/3ML
3 SOLUTION RESPIRATORY (INHALATION)
Status: DISCONTINUED | OUTPATIENT
Start: 2024-01-01 | End: 2024-01-01

## 2024-01-01 RX ORDER — PROPOFOL 10 MG/ML
1 INJECTION, EMULSION INTRAVENOUS ONCE
Status: DISCONTINUED | OUTPATIENT
Start: 2024-01-01 | End: 2024-01-01

## 2024-01-01 RX ORDER — KETOROLAC TROMETHAMINE 15 MG/ML
15 INJECTION, SOLUTION INTRAMUSCULAR; INTRAVENOUS EVERY 6 HOURS PRN
Status: DISCONTINUED | OUTPATIENT
Start: 2024-01-01 | End: 2024-11-19 | Stop reason: HOSPADM

## 2024-01-01 RX ORDER — LORAZEPAM 2 MG/ML
2 INJECTION INTRAMUSCULAR EVERY 10 MIN PRN
Status: DISCONTINUED | OUTPATIENT
Start: 2024-01-01 | End: 2024-01-01 | Stop reason: HOSPADM

## 2024-01-01 RX ORDER — BISACODYL 10 MG/1
10 SUPPOSITORY RECTAL DAILY PRN
COMMUNITY

## 2024-01-01 RX ORDER — DEXTROSE 50 % IN WATER (D50W) INTRAVENOUS SYRINGE
12.5
Status: DISCONTINUED | OUTPATIENT
Start: 2024-01-01 | End: 2024-01-01 | Stop reason: HOSPADM

## 2024-01-01 RX ORDER — HALOPERIDOL 5 MG/ML
1 INJECTION INTRAMUSCULAR EVERY 4 HOURS PRN
Status: DISCONTINUED | OUTPATIENT
Start: 2024-01-01 | End: 2024-11-19 | Stop reason: HOSPADM

## 2024-01-01 RX ORDER — PANTOPRAZOLE SODIUM 40 MG/1
40 TABLET, DELAYED RELEASE ORAL
Status: DISCONTINUED | OUTPATIENT
Start: 2024-01-01 | End: 2024-01-01 | Stop reason: HOSPADM

## 2024-01-01 RX ORDER — ADHESIVE BANDAGE
30 BANDAGE TOPICAL DAILY PRN
COMMUNITY

## 2024-01-01 RX ORDER — VANCOMYCIN HYDROCHLORIDE 1 G/20ML
INJECTION, POWDER, LYOPHILIZED, FOR SOLUTION INTRAVENOUS DAILY PRN
Status: DISCONTINUED | OUTPATIENT
Start: 2024-01-01 | End: 2024-01-01 | Stop reason: HOSPADM

## 2024-01-01 RX ORDER — PANTOPRAZOLE SODIUM 40 MG/10ML
40 INJECTION, POWDER, LYOPHILIZED, FOR SOLUTION INTRAVENOUS
Status: DISCONTINUED | OUTPATIENT
Start: 2024-01-01 | End: 2024-01-01 | Stop reason: HOSPADM

## 2024-01-01 RX ORDER — MAGNESIUM SULFATE HEPTAHYDRATE 40 MG/ML
2 INJECTION, SOLUTION INTRAVENOUS ONCE
Status: COMPLETED | OUTPATIENT
Start: 2024-01-01 | End: 2024-01-01

## 2024-01-01 RX ORDER — ETOMIDATE 2 MG/ML
0.3 INJECTION INTRAVENOUS ONCE
Status: DISCONTINUED | OUTPATIENT
Start: 2024-01-01 | End: 2024-01-01

## 2024-01-01 RX ORDER — MIDAZOLAM HYDROCHLORIDE 1 MG/ML
1 INJECTION, SOLUTION INTRAMUSCULAR; INTRAVENOUS ONCE
Status: COMPLETED | OUTPATIENT
Start: 2024-01-01 | End: 2024-01-01

## 2024-01-01 RX ORDER — IPRATROPIUM BROMIDE AND ALBUTEROL SULFATE 2.5; .5 MG/3ML; MG/3ML
9 SOLUTION RESPIRATORY (INHALATION) ONCE
Status: COMPLETED | OUTPATIENT
Start: 2024-01-01 | End: 2024-01-01

## 2024-01-01 RX ORDER — MORPHINE SULFATE 2 MG/ML
2 INJECTION, SOLUTION INTRAMUSCULAR; INTRAVENOUS
Status: DISCONTINUED | OUTPATIENT
Start: 2024-01-01 | End: 2024-01-01 | Stop reason: HOSPADM

## 2024-01-01 RX ORDER — MIRTAZAPINE 7.5 MG/1
7.5 TABLET, FILM COATED ORAL NIGHTLY
COMMUNITY

## 2024-01-01 RX ORDER — DOCUSATE SODIUM 100 MG/1
100 CAPSULE, LIQUID FILLED ORAL NIGHTLY
COMMUNITY

## 2024-01-01 RX ADMIN — LORAZEPAM 2 MG: 2 INJECTION, SOLUTION INTRAMUSCULAR; INTRAVENOUS at 15:09

## 2024-01-01 RX ADMIN — IPRATROPIUM BROMIDE AND ALBUTEROL SULFATE 3 ML: 2.5; .5 SOLUTION RESPIRATORY (INHALATION) at 08:53

## 2024-01-01 RX ADMIN — NOREPINEPHRINE BITARTRATE 0.5 MCG/KG/MIN: 8 INJECTION, SOLUTION INTRAVENOUS at 08:00

## 2024-01-01 RX ADMIN — FLUDROCORTISONE ACETATE 0.1 MG: 0.1 TABLET ORAL at 09:55

## 2024-01-01 RX ADMIN — FENTANYL CITRATE 25 MCG: 50 INJECTION, SOLUTION INTRAMUSCULAR; INTRAVENOUS at 03:30

## 2024-01-01 RX ADMIN — Medication 6 L/MIN: at 21:20

## 2024-01-01 RX ADMIN — NOREPINEPHRINE BITARTRATE 0.5 MCG/KG/MIN: 8 INJECTION, SOLUTION INTRAVENOUS at 13:09

## 2024-01-01 RX ADMIN — DOCUSATE SODIUM 100 MG: 50 LIQUID ORAL at 08:01

## 2024-01-01 RX ADMIN — POLYETHYLENE GLYCOL 3350 17 G: 17 POWDER, FOR SOLUTION ORAL at 08:01

## 2024-01-01 RX ADMIN — HYDROCORTISONE SODIUM SUCCINATE 50 MG: 100 INJECTION, POWDER, FOR SOLUTION INTRAMUSCULAR; INTRAVENOUS at 12:17

## 2024-01-01 RX ADMIN — PERFLUTREN 10 ML OF DILUTION: 6.52 INJECTION, SUSPENSION INTRAVENOUS at 10:00

## 2024-01-01 RX ADMIN — CEFEPIME 2 G: 2 INJECTION, POWDER, FOR SOLUTION INTRAVENOUS at 22:38

## 2024-01-01 RX ADMIN — NOREPINEPHRINE BITARTRATE 0.01 MCG/KG/MIN: 8 INJECTION, SOLUTION INTRAVENOUS at 00:36

## 2024-01-01 RX ADMIN — DEXMEDETOMIDINE HYDROCHLORIDE 0.2 MCG/KG/HR: 400 INJECTION INTRAVENOUS at 07:34

## 2024-01-01 RX ADMIN — MIDAZOLAM 1 MG: 1 INJECTION INTRAMUSCULAR; INTRAVENOUS at 04:48

## 2024-01-01 RX ADMIN — FENTANYL CITRATE 25 MCG: 50 INJECTION, SOLUTION INTRAMUSCULAR; INTRAVENOUS at 03:46

## 2024-01-01 RX ADMIN — CEFEPIME 1 G: 1 INJECTION, SOLUTION INTRAVENOUS at 08:04

## 2024-01-01 RX ADMIN — MIDAZOLAM 1 MG: 1 INJECTION INTRAMUSCULAR; INTRAVENOUS at 06:24

## 2024-01-01 RX ADMIN — HEPARIN SODIUM 5000 UNITS: 5000 INJECTION, SOLUTION INTRAVENOUS; SUBCUTANEOUS at 10:38

## 2024-01-01 RX ADMIN — INSULIN LISPRO 1 UNITS: 100 INJECTION, SOLUTION INTRAVENOUS; SUBCUTANEOUS at 12:17

## 2024-01-01 RX ADMIN — IOHEXOL 90 ML: 350 INJECTION, SOLUTION INTRAVENOUS at 23:31

## 2024-01-01 RX ADMIN — NOREPINEPHRINE BITARTRATE 0.5 MCG/KG/MIN: 8 INJECTION, SOLUTION INTRAVENOUS at 06:10

## 2024-01-01 RX ADMIN — MAGNESIUM SULFATE HEPTAHYDRATE 2 G: 40 INJECTION, SOLUTION INTRAVENOUS at 08:01

## 2024-01-01 RX ADMIN — AZITHROMYCIN MONOHYDRATE 500 MG: 500 INJECTION, POWDER, LYOPHILIZED, FOR SOLUTION INTRAVENOUS at 04:10

## 2024-01-01 RX ADMIN — CALCIUM GLUCONATE 2 G: 20 INJECTION, SOLUTION INTRAVENOUS at 09:54

## 2024-01-01 RX ADMIN — DROPERIDOL 2.5 MG: 2.5 INJECTION, SOLUTION INTRAMUSCULAR; INTRAVENOUS at 00:46

## 2024-01-01 RX ADMIN — HEPARIN SODIUM 5000 UNITS: 5000 INJECTION, SOLUTION INTRAVENOUS; SUBCUTANEOUS at 05:52

## 2024-01-01 RX ADMIN — Medication 125 MCG/HR: at 10:06

## 2024-01-01 RX ADMIN — SODIUM BICARBONATE 100 ML/HR: 84 INJECTION, SOLUTION INTRAVENOUS at 03:48

## 2024-01-01 RX ADMIN — VASOPRESSIN 0.03 UNITS/MIN: 0.2 INJECTION INTRAVENOUS at 08:00

## 2024-01-01 RX ADMIN — SODIUM BICARBONATE 50 MEQ: 84 INJECTION INTRAVENOUS at 04:47

## 2024-01-01 RX ADMIN — TRANEXAMIC ACID 1000 MG: 10 INJECTION, SOLUTION INTRAVENOUS at 23:00

## 2024-01-01 RX ADMIN — Medication 75 MCG/HR: at 02:47

## 2024-01-01 RX ADMIN — Medication 50 L/MIN: at 22:04

## 2024-01-01 RX ADMIN — Medication 60 PERCENT: at 08:03

## 2024-01-01 RX ADMIN — HYDROCORTISONE SODIUM SUCCINATE 50 MG: 100 INJECTION, POWDER, FOR SOLUTION INTRAMUSCULAR; INTRAVENOUS at 05:56

## 2024-01-01 RX ADMIN — NOREPINEPHRINE BITARTRATE 0.5 MCG/KG/MIN: 8 INJECTION, SOLUTION INTRAVENOUS at 10:35

## 2024-01-01 RX ADMIN — MAGNESIUM SULFATE HEPTAHYDRATE 2 G: 40 INJECTION, SOLUTION INTRAVENOUS at 10:10

## 2024-01-01 RX ADMIN — TRANEXAMIC ACID 1000 MG: 10 INJECTION, SOLUTION INTRAVENOUS at 22:19

## 2024-01-01 RX ADMIN — PANTOPRAZOLE SODIUM 40 MG: 40 INJECTION, POWDER, FOR SOLUTION INTRAVENOUS at 06:00

## 2024-01-01 RX ADMIN — METHYLPREDNISOLONE SODIUM SUCCINATE 125 MG: 125 INJECTION, POWDER, FOR SOLUTION INTRAMUSCULAR; INTRAVENOUS at 01:32

## 2024-01-01 RX ADMIN — IPRATROPIUM BROMIDE AND ALBUTEROL SULFATE 3 ML: 2.5; .5 SOLUTION RESPIRATORY (INHALATION) at 13:24

## 2024-01-01 RX ADMIN — Medication 2 G: at 23:43

## 2024-01-01 RX ADMIN — MEROPENEM 500 MG: 500 INJECTION, POWDER, FOR SOLUTION INTRAVENOUS at 10:35

## 2024-01-01 RX ADMIN — SODIUM CHLORIDE 1000 ML: 9 INJECTION, SOLUTION INTRAVENOUS at 21:38

## 2024-01-01 RX ADMIN — IPRATROPIUM BROMIDE AND ALBUTEROL SULFATE 9 ML: 2.5; .5 SOLUTION RESPIRATORY (INHALATION) at 00:42

## 2024-01-01 RX ADMIN — VASOPRESSIN 0.03 UNITS/MIN: 0.2 INJECTION INTRAVENOUS at 04:48

## 2024-01-01 ASSESSMENT — LIFESTYLE VARIABLES
EVER HAD A DRINK FIRST THING IN THE MORNING TO STEADY YOUR NERVES TO GET RID OF A HANGOVER: NO
TOTAL SCORE: 0
HAVE YOU EVER FELT YOU SHOULD CUT DOWN ON YOUR DRINKING: NO
EVER FELT BAD OR GUILTY ABOUT YOUR DRINKING: NO
HAVE PEOPLE ANNOYED YOU BY CRITICIZING YOUR DRINKING: NO

## 2024-01-01 ASSESSMENT — ACTIVITIES OF DAILY LIVING (ADL)
BATHING: UNABLE TO ASSESS
DRESSING YOURSELF: UNABLE TO ASSESS
JUDGMENT_ADEQUATE_SAFELY_COMPLETE_DAILY_ACTIVITIES: UNABLE TO ASSESS
ASSISTIVE_DEVICE: OTHER (COMMENT)
ASSISTIVE_DEVICE: OTHER (COMMENT)
HEARING - RIGHT EAR: UNABLE TO ASSESS
TOILETING: UNABLE TO ASSESS
PATIENT'S MEMORY ADEQUATE TO SAFELY COMPLETE DAILY ACTIVITIES?: UNABLE TO ASSESS
GROOMING: UNABLE TO ASSESS
FEEDING YOURSELF: UNABLE TO ASSESS
HEARING - LEFT EAR: UNABLE TO ASSESS
WALKS IN HOME: UNABLE TO ASSESS
ADEQUATE_TO_COMPLETE_ADL: UNABLE TO ASSESS

## 2024-01-01 ASSESSMENT — COGNITIVE AND FUNCTIONAL STATUS - GENERAL
WALKING IN HOSPITAL ROOM: TOTAL
PERSONAL GROOMING: TOTAL
TURNING FROM BACK TO SIDE WHILE IN FLAT BAD: TOTAL
STANDING UP FROM CHAIR USING ARMS: TOTAL
EATING MEALS: TOTAL
CLIMB 3 TO 5 STEPS WITH RAILING: TOTAL
EATING MEALS: TOTAL
DRESSING REGULAR UPPER BODY CLOTHING: TOTAL
STANDING UP FROM CHAIR USING ARMS: TOTAL
TURNING FROM BACK TO SIDE WHILE IN FLAT BAD: TOTAL
DAILY ACTIVITIY SCORE: 6
MOBILITY SCORE: 6
TOILETING: TOTAL
PERSONAL GROOMING: TOTAL
MOVING TO AND FROM BED TO CHAIR: TOTAL
DRESSING REGULAR LOWER BODY CLOTHING: TOTAL
DAILY ACTIVITIY SCORE: 6
PATIENT BASELINE BEDBOUND: YES
MOBILITY SCORE: 6
MOVING TO AND FROM BED TO CHAIR: TOTAL
MOVING FROM LYING ON BACK TO SITTING ON SIDE OF FLAT BED WITH BEDRAILS: TOTAL
TOILETING: TOTAL
CLIMB 3 TO 5 STEPS WITH RAILING: TOTAL
DRESSING REGULAR LOWER BODY CLOTHING: TOTAL
HELP NEEDED FOR BATHING: TOTAL
HELP NEEDED FOR BATHING: TOTAL
WALKING IN HOSPITAL ROOM: TOTAL
DRESSING REGULAR UPPER BODY CLOTHING: TOTAL
MOVING FROM LYING ON BACK TO SITTING ON SIDE OF FLAT BED WITH BEDRAILS: TOTAL

## 2024-01-01 ASSESSMENT — PAIN SCALES - WONG BAKER: WONGBAKER_NUMERICALRESPONSE: HURTS EVEN MORE

## 2024-01-01 ASSESSMENT — PAIN - FUNCTIONAL ASSESSMENT
PAIN_FUNCTIONAL_ASSESSMENT: CPOT (CRITICAL CARE PAIN OBSERVATION TOOL)
PAIN_FUNCTIONAL_ASSESSMENT: WONG-BAKER FACES

## 2024-02-01 ENCOUNTER — OFFICE VISIT (OUTPATIENT)
Dept: UROLOGY | Facility: CLINIC | Age: 64
End: 2024-02-01
Payer: MEDICAID

## 2024-02-01 VITALS
SYSTOLIC BLOOD PRESSURE: 154 MMHG | HEIGHT: 73 IN | RESPIRATION RATE: 16 BRPM | HEART RATE: 61 BPM | DIASTOLIC BLOOD PRESSURE: 86 MMHG | BODY MASS INDEX: 30.88 KG/M2 | WEIGHT: 233 LBS

## 2024-02-01 DIAGNOSIS — N31.9 NEUROGENIC BLADDER: ICD-10-CM

## 2024-02-01 PROCEDURE — 99213 OFFICE O/P EST LOW 20 MIN: CPT | Performed by: UROLOGY

## 2024-02-01 PROCEDURE — 51705 CHANGE OF BLADDER TUBE: CPT | Performed by: UROLOGY

## 2024-02-01 PROCEDURE — 1036F TOBACCO NON-USER: CPT | Performed by: UROLOGY

## 2024-02-01 ASSESSMENT — PAIN SCALES - GENERAL: PAINLEVEL: 0-NO PAIN
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
## 2024-06-27 NOTE — PROGRESS NOTES
CHIEF COMPLAINT/HISTORY OF PRESENT ILLNESS:  Adarsh Martin is a 61 year old male who presents for Medicare Wellness Exam.  He has a history of having developmental delay and had an episode of paranoid psychosis.        Past Medical History:   Diagnosis Date    Cerumen impaction     Developmental disability     stated per mother    Fracture     leg    Paranoid psychosis (CMD) 09/01/2022    Dr. Webb [facility]       Past Surgical History:   Procedure Laterality Date    Colonoscopy diagnostic  06/24/2016    Affi 5yr recall, poor prep    Open access colonoscopy  03/25/2022    repeat 10yrs    Tonsillectomy      [place] tooth extraction         ALLERGIES:   Allergen Reactions    Depakote Nausea & Vomiting       Current Outpatient Medications   Medication Sig Dispense Refill    ARIPiprazole (ABILIFY) 15 MG tablet       ARIPiprazole (ABILIFY) 2 MG tablet       traZODone (DESYREL) 100 MG tablet       divalproex (DEPAKOTE) 500 MG delayed release EC tablet Take 1 tablet by mouth in the morning and 1 tablet in the evening. 60 tablet 11    memantine (NAMENDA) 10 MG tablet 1 tablet nightly. 30 tablet 11    memantine (NAMENDA) 5 MG tablet Take 1 tablet by mouth daily. 30 tablet 11    risperiDONE (RisperDAL) 2 MG tablet Take 1 tablet by mouth in the morning and 1 tablet in the evening. 180 tablet 3    benztropine (COGENTIN) 1 MG tablet Take 1 tablet by mouth in the morning and 1 tablet in the evening. 180 tablet 3    polyethylene glycol (MIRALAX) 17 g packet Take 17 g by mouth daily. Stir and dissolve powder in any 4 to 8 ounces of beverage, then drink. 90 each 3    Omega-3 Fatty Acids (FISH OIL PO) Take 1 capsule by mouth daily.      Cholecalciferol (Vitamin D) 50 mcg (2,000 units) tablet Take 50 mcg by mouth daily.       No current facility-administered medications for this visit.       Family History   Problem Relation Age of Onset    NEGATIVE FAMILY HX OF Mother     Cancer Father         Prostate Cancer    Thyroid  Sister         pre-cancerous    Patient is unaware of any medical problems Sister     Patient is unaware of any medical problems Brother     Patient is unaware of any medical problems Brother     Patient is unaware of any medical problems Brother     Diabetes Other        Social History     Tobacco Use    Smoking status: Former    Smokeless tobacco: Never   Vaping Use    Vaping status: never used   Substance Use Topics    Alcohol use: Yes     Alcohol/week: 0.0 - 1.0 standard drinks of alcohol     Comment: pt mother states very very seldom    Drug use: No       Patient Care Team:  Peter Wheeler DO as PCP - General (Family Practice)    REVIEW OF SYSTEMS: GENERAL:  Patient denies fever, chills, tiredness, malaise, weight loss, weight gain.  EYES:  Patient denies blurred vision, double vision, pain.   NEUROLOGIC:  Patient denies tremors, dizzy spells, numbness, tingling.  ENDOCRINE:  Patient denies excessive thirst, heat intolerance, tired/sluggishness.  GASTROINTESTINAL:  Patient denies abdominal pain, nausea/vomiting, indigestion/heartburn, diarrhea, constipation.  CARDIOVASCUALR:  Patient denies chest pain, shortness of breath, palpitations.  SKIN:  Patient denies skin rashes, boils, persistent itching.  MUSCULOSKELETAL:  Patient denies joint pain, neck pain, back pain.  ENT/MOUTH:  Patient denies ear infections, sore throats, has a sinus problems.  :  Patient denies urine retention, painful urination, urinary frequency, blood in urine.  RESPIRATORY:  Patient denies wheezing, frequent cough, shortness of breath.  PSYCHIATRIC:  Patient denies symptoms of depression, feeling sad or blue, symptoms of anxiety, feeling jittery, panicky, paranoid thinking, and delusional thinking     PHYSICAL EXAMINATION: General:   awake, alert and oriented and in no acute distress  Vitals:   Visit Vitals  /74 (BP Location: RUE - Right upper extremity, Patient Position: Sitting, Cuff Size: Regular)   Pulse (!) 59   Ht 5' 5\"  (1.651 m)   Wt 74 kg (163 lb 3.2 oz)   SpO2 96%   BMI 27.16 kg/m²     HEENT:   normocephalic, atraumatic, pupils equal, round and reactive to light and accommodation, tympanic membranes clear, nasal mucosa normal, and pharynx normal  Lymph nodes: No nodes palpated on the head, neck, or supraclavicular  Neck:    no thyromegaly, no JVD, and no neck masses  Lungs:    clear to auscultation, good air entry, no rales or wheezes, and no rhonchi  Cardiovascular:   no JVD, S1 and S2 normal, no S3 or S4, no clicks, rubs or murmurs, and regular rate and rhythm  Abdomen:   soft, non-tender, nondistended, no hepatosplenomegaly, normal active bowel sounds, and no masses palpated    IMMUNIZATIONS:   Immunization History   Administered Date(s) Administered    Tdap 09/30/2013       Diagnoses and associated orders for this visit:  1. Medicare annual wellness visit, subsequent  2. Paranoid disorder  (CMD)  -     Valproic Acid  -     CBC No Differential  -     Comprehensive Metabolic Panel  -     Thyroid Stimulating Hormone Reflex  3. Screening for diabetes mellitus (DM)  -     Glycohemoglobin  4. Screening for cardiovascular condition  -     Lipid Panel With Reflex  5. Screening for prostate cancer  -     PSA  6. Bilateral impacted cerumen  -     Cerumen Rem, Irrigation Bilat  7. Paranoid schizophrenia  (CMD)  Assessment & Plan:  Monitor: The problem is stable.  Evaluation: Labs/tests ordered, see encounter summary.  Assessment/Treatment:  Managed by specialist.       Follow-up: 1 year and with the psychiatrist in July 2024      ------------    MEDICARE WELLNESS VISIT NOTE    HISTORY OF PRESENT ILLNESS:   Adarsh presents for his Subsequent Annual Medicare Wellness Visit.   He has no current complaints or concerns.      Patient Care Team:  Peter Wheeler DO as PCP - General (Family Practice)        Patient Active Problem List   Diagnosis    Extrapyramidal movement disorder, drug-induced    Paranoid schizophrenia  (CMD)         Past  Perseveration   [x] Cueing necessary for accurate completion of task        DISCHARGE PLANNING:  Given current cognitive/linguistic status, SLP recommends:   []24 Hour Supervision   [] Close Supervision     [x]Intermittent Supervision   [] No Supervision    [x]  Patient stated goals: \"to get better and go home\"          Education was completed:  Speech Pathologist (SLP) completed education with the patient and/or family regarding identified cognitive linguistic deficits and subsequent need for speech pathology intervention. Discussed deficit areas to be targeted by formal intervention and established short/long term goals. Reviewed compensatory strategies to improve functional outcome (as appropriate). Encouraged patient and/or family to engage SLP in structured Q&A session relative to identified deficit areas. Patient and/or family indicated understanding of all information provided via satisfactory verbal response. [x] Patient was an active participant in goal planning process. [] Patient was unable to participate in goal planning process. [] Goal planning not appropriate at this time as intervention was not recommended. SPEECH PATHOLOGY DIAGNOSIS:  Pt p/w mild-mod cognitive-linguistic deficits characterized by decreased attention to task, impulsivity, decreased safety awareness and problem solving, and decreased ability to follow intermediate to complex directives. Pt is mod-severely [place], and he reported he will be getting a hearing aid in June. Pt requires additional time to process directives and it is suspected his impulsivity and decreased safety awareness make him unsafe to return home alone at this time. The pt did report Ruy Scott' is his daily caregiver and she completes all chores for the pt. Evaluating SLP is unaware of pt's cognitive-linguistic baseline at time of evaluation.        THERAPY RECOMMENDATIONS:  Impairments result in the decreased ability for: [x]ADL's    [] Work   [] School   [x] Medical History:   Diagnosis Date    Cerumen impaction     Developmental disability     stated per mother    Fracture     leg    Paranoid psychosis (CMD) 09/01/2022    Dr. Webb [facility]         Past Surgical History:   Procedure Laterality Date    Colonoscopy diagnostic  06/24/2016    Affi 5yr recall, poor prep    Open access colonoscopy  03/25/2022    repeat 10yrs    Tonsillectomy      [place] tooth extraction           Social History     Tobacco Use    Smoking status: Former    Smokeless tobacco: Never   Vaping Use    Vaping status: never used   Substance Use Topics    Alcohol use: Yes     Alcohol/week: 0.0 - 1.0 standard drinks of alcohol     Comment: pt mother states very very seldom    Drug use: No     Drug use:    Drug Use:    No              Family History   Problem Relation Age of Onset    NEGATIVE FAMILY HX OF Mother     Cancer Father         Prostate Cancer    Thyroid Sister         pre-cancerous    Patient is unaware of any medical problems Sister     Patient is unaware of any medical problems Brother     Patient is unaware of any medical problems Brother     Patient is unaware of any medical problems Brother     Diabetes Other        Current Outpatient Medications   Medication Sig Dispense Refill    ARIPiprazole (ABILIFY) 15 MG tablet       ARIPiprazole (ABILIFY) 2 MG tablet       traZODone (DESYREL) 100 MG tablet       divalproex (DEPAKOTE) 500 MG delayed release EC tablet Take 1 tablet by mouth in the morning and 1 tablet in the evening. 60 tablet 11    memantine (NAMENDA) 10 MG tablet 1 tablet nightly. 30 tablet 11    memantine (NAMENDA) 5 MG tablet Take 1 tablet by mouth daily. 30 tablet 11    risperiDONE (RisperDAL) 2 MG tablet Take 1 tablet by mouth in the morning and 1 tablet in the evening. 180 tablet 3    benztropine (COGENTIN) 1 MG tablet Take 1 tablet by mouth in the morning and 1 tablet in the evening. 180 tablet 3    polyethylene glycol (MIRALAX) 17 g packet Take 17 g by  mouth daily. Stir and dissolve powder in any 4 to 8 ounces of beverage, then drink. 90 each 3    Omega-3 Fatty Acids (FISH OIL PO) Take 1 capsule by mouth daily.      Cholecalciferol (Vitamin D) 50 mcg (2,000 units) tablet Take 50 mcg by mouth daily.       No current facility-administered medications for this visit.        The following items on the Medicare Health Risk Assessment were found to be positive  6 b.) How many servings of High Fiber / Whole Grain Foods to you have each day ( 1 serving = 1 cup cold cereal, 1/2 cup cooked cereal, 1 slice bread): 1 per day     7b.) Do you feel unsteady when standing or walking?: Yes     7c.) Do you worry about falling?: Yes     13.) Do you need help with any of the following activities?: Prepare a meal, Handle your own money         Vision and Hearing screens: Not performed    Advance care planning documents on file - no     Cognitive/Functional Status: preexisting cognitive issues - stable    Opioid Review: Adarsh is not taking opioid medications.    Recent PHQ 2/9 Score:    PHQ 2:  PHQ 2 Score Adult PHQ 2 Score Adult PHQ 2 Interpretation Little interest or pleasure in activity?   6/27/2024  10:32 AM 0 No further screening needed 0       PHQ 9:       DEPRESSION ASSESSMENT/PLAN:  Depression screening is negative no further plan needed.     Body mass index is 27.16 kg/m².    BMI FOLLOW-UP/PLAN:  BMI is in overweight range.    20 minutes of physical activity a day       Needed Screening/Treatment:   Orders Placed This Encounter    Cerumen Rem, Irrigation Bilat    Valproic Acid    CBC No Differential    Comprehensive Metabolic Panel    Thyroid Stimulating Hormone Reflex    Glycohemoglobin    Lipid Panel With Reflex    PSA     Mother declines shingles vaccination  Needed follow up:  1 year for a CPE  Continue follow up with [facility]    See orders.   See Patient Instructions section.   Return in about 1 year (around 6/27/2025) for Medicare wellness exam.

## 2024-09-22 ENCOUNTER — HOSPITAL ENCOUNTER (EMERGENCY)
Facility: HOSPITAL | Age: 64
Discharge: HOME | End: 2024-09-23
Attending: STUDENT IN AN ORGANIZED HEALTH CARE EDUCATION/TRAINING PROGRAM
Payer: MEDICAID

## 2024-09-22 DIAGNOSIS — T83.9XXA FOLEY CATHETER PROBLEM, INITIAL ENCOUNTER (CMS-HCC): Primary | ICD-10-CM

## 2024-09-22 LAB
APPEARANCE UR: CLEAR
BILIRUB UR STRIP.AUTO-MCNC: NEGATIVE MG/DL
COLOR UR: YELLOW
GLUCOSE UR STRIP.AUTO-MCNC: NORMAL MG/DL
KETONES UR STRIP.AUTO-MCNC: NEGATIVE MG/DL
LEUKOCYTE ESTERASE UR QL STRIP.AUTO: ABNORMAL
MUCOUS THREADS #/AREA URNS AUTO: ABNORMAL /LPF
NITRITE UR QL STRIP.AUTO: NEGATIVE
PH UR STRIP.AUTO: 6.5 [PH]
PROT UR STRIP.AUTO-MCNC: ABNORMAL MG/DL
RBC # UR STRIP.AUTO: NEGATIVE /UL
RBC #/AREA URNS AUTO: ABNORMAL /HPF
SP GR UR STRIP.AUTO: 1.01
UROBILINOGEN UR STRIP.AUTO-MCNC: NORMAL MG/DL
WBC #/AREA URNS AUTO: ABNORMAL /HPF

## 2024-09-22 PROCEDURE — 99284 EMERGENCY DEPT VISIT MOD MDM: CPT

## 2024-09-22 PROCEDURE — 99285 EMERGENCY DEPT VISIT HI MDM: CPT | Performed by: STUDENT IN AN ORGANIZED HEALTH CARE EDUCATION/TRAINING PROGRAM

## 2024-09-22 PROCEDURE — 81001 URINALYSIS AUTO W/SCOPE: CPT | Performed by: STUDENT IN AN ORGANIZED HEALTH CARE EDUCATION/TRAINING PROGRAM

## 2024-09-22 PROCEDURE — 51702 INSERT TEMP BLADDER CATH: CPT | Performed by: STUDENT IN AN ORGANIZED HEALTH CARE EDUCATION/TRAINING PROGRAM

## 2024-09-22 ASSESSMENT — PAIN - FUNCTIONAL ASSESSMENT: PAIN_FUNCTIONAL_ASSESSMENT: 0-10

## 2024-09-22 ASSESSMENT — LIFESTYLE VARIABLES
EVER HAD A DRINK FIRST THING IN THE MORNING TO STEADY YOUR NERVES TO GET RID OF A HANGOVER: NO
HAVE PEOPLE ANNOYED YOU BY CRITICIZING YOUR DRINKING: NO
TOTAL SCORE: 0
HAVE YOU EVER FELT YOU SHOULD CUT DOWN ON YOUR DRINKING: NO
EVER FELT BAD OR GUILTY ABOUT YOUR DRINKING: NO

## 2024-09-22 ASSESSMENT — PAIN SCALES - GENERAL: PAINLEVEL_OUTOF10: 7

## 2024-09-23 VITALS
BODY MASS INDEX: 29.82 KG/M2 | DIASTOLIC BLOOD PRESSURE: 79 MMHG | HEIGHT: 73 IN | WEIGHT: 225 LBS | HEART RATE: 66 BPM | TEMPERATURE: 97.5 F | RESPIRATION RATE: 20 BRPM | OXYGEN SATURATION: 94 % | SYSTOLIC BLOOD PRESSURE: 153 MMHG

## 2024-09-23 LAB — HOLD SPECIMEN: NORMAL

## 2024-09-23 ASSESSMENT — PAIN SCALES - GENERAL
PAINLEVEL_OUTOF10: 0 - NO PAIN
PAINLEVEL_OUTOF10: 0 - NO PAIN

## 2024-09-23 NOTE — ED PROVIDER NOTES
EMERGENCY DEPARTMENT ENCOUNTER      Pt Name: Robel Eastman  MRN: [id number]  Birthdate 1960  Date of evaluation: 9/22/2024  Provider: Zoey Ghotra MD    CHIEF COMPLAINT       Chief Complaint   Patient presents with    Ayon Placement    Difficulty Urinating     HISTORY OF PRESENT ILLNESS    Robel Eastman is a 64 y.o. year old male who presents to the ER from his nursing facility after his suprapubic catheter was accidentally dislodged.  The nursing facility was unable to reinsert the catheter so he was transferred here to this ED.  Patient denies fevers, chills, sore throat, coughs.  Patient complains of some abdominal pain that he attributes to constipation.  Patient's past medical history significant for hypertension, CAD, neurogenic bladder, hyperlipidemia, asthma, CKD, glaucoma.  Suprapubic catheter was placed in July 2023.     PAST MEDICAL HISTORY     Past Medical History:   Diagnosis Date    Age-related nuclear cataract, left eye 01/08/2019    Age-related nuclear cataract, left    Age-related nuclear cataract, right eye 01/08/2019    Age-related nuclear cataract, right    Other specified disorders of nose and nasal sinuses 12/06/2019    Nasal vestibulitis    Spondylosis without myelopathy or radiculopathy, site unspecified 10/12/2017    Degenerative spinal arthritis     CURRENT MEDICATIONS       Previous Medications    No medications on file     SURGICAL HISTORY       Past Surgical History:   Procedure Laterality Date    OTHER SURGICAL HISTORY  12/06/2019    Hand surgery    OTHER SURGICAL HISTORY  07/26/2019    Cataract surgery    OTHER SURGICAL HISTORY  07/26/2019    Cholecystectomy laparoscopic     ALLERGIES     Penicillin and Penicillins  FAMILY HISTORY     No family history on file.  SOCIAL HISTORY       Social History     Tobacco Use    Smoking status: Never    Smokeless tobacco: Never   Substance Use Topics    Alcohol use: Not on file    Drug use: Not on file     PHYSICAL EXAM  (up to 7 for  level 4, 8 or more for level 5)     ED Triage Vitals [09/22/24 1909]   Temperature Heart Rate Respirations BP   36.4 °C (97.5 °F) 73 16 (!) 173/94      Pulse Ox Temp Source Heart Rate Source Patient Position   95 % Temporal -- --      BP Location FiO2 (%)     -- --       Physical Exam  Constitutional:       Appearance: Normal appearance.   HENT:      Head: Normocephalic and atraumatic.   Cardiovascular:      Rate and Rhythm: Normal rate and regular rhythm.      Pulses: Normal pulses.      Heart sounds: Normal heart sounds.   Pulmonary:      Effort: Pulmonary effort is normal.      Breath sounds: Normal breath sounds.   Abdominal:      General: There is distension.      Palpations: Abdomen is soft. There is no mass.      Tenderness: There is no abdominal tenderness. There is no guarding or rebound.      Hernia: No hernia is present.      Comments: Suprapubic catheter incision site is within normal limits.  No foul-smelling discharge, no urine output from the stoma, the surrounding skin is not erythematous or edematous.   Genitourinary:     Penis: Normal.    Skin:     General: Skin is warm and dry.   Neurological:      Mental Status: He is alert.        DIAGNOSTIC RESULTS   LABS:  Labs Reviewed   URINALYSIS WITH REFLEX MICROSCOPIC - Abnormal       Result Value    Color, Urine Yellow      Appearance, Urine Clear      Specific Gravity, Urine 1.014      pH, Urine 6.5      Protein, Urine 30 (1+) (*)     Glucose, Urine Normal      Blood, Urine NEGATIVE      Ketones, Urine NEGATIVE      Bilirubin, Urine NEGATIVE      Urobilinogen, Urine Normal      Nitrite, Urine NEGATIVE      Leukocyte Esterase, Urine 75 Talat/µL (*)    MICROSCOPIC ONLY, URINE - Abnormal    WBC, Urine 11-20 (*)     RBC, Urine 11-20 (*)     Mucus, Urine FEW     URINE GRAY TUBE     All other labs were within normal range or not returned as of this dictation.  Imaging  No orders to display      Procedure  Procedures  EMERGENCY DEPARTMENT COURSE/MDM:   Medical  Decision Making  The patient is a 64-year-old male who comes to the ED from his nursing facility after his suprapubic catheter was accidentally dislodged.  The nursing facility was unable to reinsert the catheter so he was transferred here to this ED.  Patient denies fevers, chills, sore throat, coughs.  Patient complains of some abdominal pain that he attributes to constipation.  Patient's past medical history significant for hypertension, CAD, neurogenic bladder, hyperlipidemia, asthma, CKD, glaucoma.  Suprapubic catheter was placed in July 2023.    Physical exam the abdomen is soft and nontender on palpation.  The abdomen is slightly distended, the bladder is distended. Suprapubic catheter incision site is within normal limits.  No foul-smelling discharge, no urine output from the stoma, the surrounding skin is not erythematous or edematous.    The attending attempted to reinsert the catheter but failed.  On consultation with urology they recommended placing a Ayon catheter and following up with his urologist outpatient.  A normal Ayon catheter was placed and the patient was discharged with a referral to urology and given return precautions and instructions to follow-up with his primary care provider in 1 week.      Vitals:    Vitals:    09/22/24 2200 09/22/24 2215 09/22/24 2230 09/22/24 2300   BP: 149/84  (!) 162/99 173/90   Pulse: 72 72 75    Resp:       Temp:       TempSrc:       SpO2: 94% 94% 95%    Weight:       Height:             ED Course as of 09/23/24 0013   Sun Sep 22, 2024   2019 Attempted to replace the suprapubic catheter unsuccessfully by attending physician, call placed to urology [JK]   2048 Spoke with Dr. Kearney who is on-call for urology today, reports that this patient is a [facility] patient and thus Afsaneh is on-call for Dr. Magdaleno.  Recommend speaking with Dr. Shelby [JK]   2143 Was able to place a regular Ayon catheter, patient to follow-up with urology this week [JK]      ED  Course User Index  [JK] Emmanuel MILLER Madelynmaria, DO         Diagnoses as of 09/23/24 0013   Ayon catheter problem, initial encounter (CMS-HCC)       Consultant discussions: On consultation with urology they recommended placing a Ayon catheter and following up with his urologist outpatient.     External Records Reviewed: I reviewed recent and relevant outside records including inpatient notes, outpatient records      Shared decision making for disposition  Patient and/or patient´s representative was counseled regarding labs, imaging, likely diagnosis. All questions were answered. Recommendation was made   for discharge home. The patient agreed and was discharged home in stable condition with appropriate relevant educational materials. Return precautions were provided which included Increasing redness, increasing warmth to touch, milky foul smelling drainage from your wound, fever, or worsening symptoms.     ED Medications administered this visit:  Medications - No data to display    New Prescriptions from this visit:    New Prescriptions    No medications on file       Follow-up:  Denia Latif MD  [address]  [facility] [id number] [phone]    In 1 week      Justin Gu MD  [address]  [facility] [address]  [phone]              Final Impression:   1. Ayon catheter problem, initial encounter (CMS-HCC)          Please excuse any misspellings or unintended errors related to the Dragon speech recognition software used to dictate this note.    I reviewed the case with the attending ED physician. The attending ED physician agrees with the plan.      Zoey Ghotra MD  Resident  09/23/24 0013

## 2024-11-17 LAB
BLOOD EXPIRATION DATE: NORMAL
BLOOD EXPIRATION DATE: NORMAL
DISPENSE STATUS: NORMAL
DISPENSE STATUS: NORMAL
PRODUCT BLOOD TYPE: 9500
PRODUCT BLOOD TYPE: 9500
PRODUCT CODE: NORMAL
PRODUCT CODE: NORMAL
UNIT ABO: NORMAL
UNIT ABO: NORMAL
UNIT NUMBER: NORMAL
UNIT NUMBER: NORMAL
UNIT RH: NORMAL
UNIT RH: NORMAL
UNIT VOLUME: 500
UNIT VOLUME: 500

## 2024-11-18 PROBLEM — R65.21 SEPTIC SHOCK (MULTI): Status: ACTIVE | Noted: 2024-01-01

## 2024-11-18 PROBLEM — J18.9 PNEUMONIA: Status: ACTIVE | Noted: 2024-01-01

## 2024-11-18 PROBLEM — N17.9 AKI (ACUTE KIDNEY INJURY) (CMS-HCC): Status: ACTIVE | Noted: 2024-01-01

## 2024-11-18 PROBLEM — J96.01 ACUTE HYPOXIC RESPIRATORY FAILURE (MULTI): Status: ACTIVE | Noted: 2024-01-01

## 2024-11-18 PROBLEM — A41.9 SEPTIC SHOCK (MULTI): Status: ACTIVE | Noted: 2024-01-01

## 2024-11-18 PROBLEM — E87.20 METABOLIC ACIDOSIS: Status: ACTIVE | Noted: 2024-01-01

## 2024-11-18 LAB
BLOOD EXPIRATION DATE: NORMAL
BLOOD EXPIRATION DATE: NORMAL
DISPENSE STATUS: NORMAL
DISPENSE STATUS: NORMAL
PRODUCT BLOOD TYPE: 9500
PRODUCT BLOOD TYPE: 9500
PRODUCT CODE: NORMAL
PRODUCT CODE: NORMAL
UNIT ABO: NORMAL
UNIT ABO: NORMAL
UNIT NUMBER: NORMAL
UNIT NUMBER: NORMAL
UNIT RH: NORMAL
UNIT RH: NORMAL
UNIT VOLUME: 500
UNIT VOLUME: 500
XM INTEP: NORMAL

## 2024-11-18 NOTE — CONSULTS
Vancomycin Dosing by Pharmacy- INITIAL    Robel Eastman is a 64 y.o. year old male who Pharmacy has been consulted for vancomycin dosing for pneumonia. Based on the patient's indication and renal status this patient will be dosed based on a goal trough/random level of 15-20.     Renal function is currently declining.    Visit Vitals  BP 93/60   Pulse 110   Temp 37.3 °C (99.1 °F) (Temporal)   Resp 21        Lab Results   Component Value Date    CREATININE 3.97 (H) 2024    CREATININE 3.67 (H) 2024    CREATININE 3.76 (H) 2024    CREATININE 0.94 10/03/2023        Patient weight is as follows:   Vitals:    24 0500   Weight: 98.4 kg (216 lb 14.9 oz)       Cultures:  No results found for the encounter in last 14 days.        No intake/output data recorded.  I/O during current shift:  I/O this shift:  In: 248.3 [Blood:248.3]  Out: 150 [Urine:150]    Temp (24hrs), Av.4 °C (99.3 °F), Min:36.8 °C (98.2 °F), Max:37.7 °C (99.9 °F)         Assessment/Plan     Patient has already been given a loading dose of 2000 mg.  Will initiate vancomycin maintenance, once 24 hr level results or renal function improves.  Follow-up level will be ordered on  at 2300 unless clinically indicated sooner.  Will continue to monitor renal function daily while on vancomycin and order serum creatinine at least every 48 hours if not already ordered.  Follow for continued vancomycin needs, clinical response, and signs/symptoms of toxicity.       MANUEL DELGADO, PharmD

## 2024-11-18 NOTE — PROCEDURES
Insert arterial line    Date/Time: 11/18/2024 5:00 AM    Performed by: JELLY Traore  Authorized by: JELLY Traore    Consent:     Consent obtained:  Emergent situation  Universal protocol:     Immediately prior to procedure, a time out was called: yes      Patient identity confirmed:  Arm band  Indications:     Indications: hemodynamic monitoring and multiple ABGs    Pre-procedure details:     Skin preparation:  Chlorhexidine    Preparation: Patient was prepped and draped in sterile fashion    Sedation:     Sedation type:  None  Anesthesia:     Anesthesia method:  None  Procedure details:     Location:  R radial    Nelson's test performed: yes      Nelson's test abnormal: no      Needle gauge:  20 G    Placement technique:  Ultrasound guided    Number of attempts:  1    Transducer: waveform confirmed    Post-procedure details:     Post-procedure:  Biopatch applied and secured with tape    CMS:  Unchanged    Procedure completion:  Tolerated well, no immediate complications

## 2024-11-18 NOTE — PROGRESS NOTES
Critical Care Daily Progress Note        Subjective   Patient is a 64 y.o. male admitted on 11/17/2024  9:20 PM with the following indication(s) for ICU care.  Patient brought in initially from his skilled nursing facility as nursing staff indicated that he was having atraumatic Ayon catheter placement and had extensive amount of blood in his Ayon catheter bag.  The patient was also altered at that point which include lethargy and tachypnea.  He was able to answer one-word questions at that time.  He was found to be in septic shock in the emergency department and transferred to the ICU for care.    Interval History: Patient intubated central line placed.     Past Medical History:   Diagnosis Date    Age-related nuclear cataract, left eye 01/08/2019    Age-related nuclear cataract, left    Age-related nuclear cataract, right eye 01/08/2019    Age-related nuclear cataract, right    Other specified disorders of nose and nasal sinuses 12/06/2019    Nasal vestibulitis    Spondylosis without myelopathy or radiculopathy, site unspecified 10/12/2017    Degenerative spinal arthritis     Past Surgical History:   Procedure Laterality Date    OTHER SURGICAL HISTORY  12/06/2019    Hand surgery    OTHER SURGICAL HISTORY  07/26/2019    Cataract surgery    OTHER SURGICAL HISTORY  07/26/2019    Cholecystectomy laparoscopic     Medications Prior to Admission   Medication Sig Dispense Refill Last Dose/Taking    amLODIPine (Norvasc) 5 mg tablet Take 1 tablet (5 mg) by mouth once daily.   11/17/2024 Morning    ascorbic acid (Vitamin C) 500 mg tablet Take 1 tablet (500 mg) by mouth once daily.   11/17/2024 Morning    aspirin 81 mg EC tablet Take 1 tablet (81 mg) by mouth once daily.   11/17/2024 Morning    atorvastatin (Lipitor) 40 mg tablet Take 1 tablet (40 mg) by mouth once daily at bedtime.   11/17/2024 Bedtime    baclofen (Lioresal) 10 mg tablet Take 1 tablet (10 mg) by mouth once daily at bedtime.   11/17/2024 Bedtime     brimonidine (AlphaGAN P) 0.15 % ophthalmic solution Administer 1 drop into both eyes 2 times a day.   11/17/2024 Evening    docusate sodium (Colace) 100 mg capsule Take 1 capsule (100 mg) by mouth once daily at bedtime.   11/17/2024 Bedtime    dorzolamide (Trusopt) 2 % ophthalmic solution Administer 1 drop into both eyes 2 times a day.   11/17/2024 Evening    DULoxetine (Cymbalta) 60 mg DR capsule Take 1 capsule (60 mg) by mouth once daily.   11/17/2024 Morning    fluticasone (Flonase) 50 mcg/actuation nasal spray Administer 1 spray into each nostril once daily.   11/17/2024 Morning    gabapentin (Neurontin) 800 mg tablet Take 1 tablet (800 mg) by mouth 2 times a day.   11/17/2024 Evening    hydrALAZINE (Apresoline) 25 mg tablet Take 1 tablet (25 mg) by mouth 2 times a day.   11/17/2024 Evening    latanoprost (Xalatan) 0.005 % ophthalmic solution Administer 1 drop into both eyes once daily at bedtime.   11/17/2024 Bedtime    mirtazapine (Remeron) 7.5 mg tablet Take 1 tablet (7.5 mg) by mouth once daily at bedtime.   11/17/2024 Bedtime    multivitamin tablet Take 1 tablet by mouth once daily.   11/17/2024 Morning    nitrofurantoin (Macrodantin) 50 mg capsule Take 1 capsule (50 mg) by mouth once daily.   11/17/2024 Morning    pantoprazole (ProtoNix) 40 mg EC tablet Take 1 tablet (40 mg) by mouth once daily in the morning. Take before meals.   11/17/2024 Morning    polyethylene glycol (Glycolax, Miralax) 17 gram packet Take 17 g by mouth once daily.   11/17/2024 Morning    acetaminophen (Tylenol) 325 mg tablet Take 2 tablets (650 mg) by mouth every 6 hours if needed for mild pain (1 - 3).   Unknown    bisacodyl (Dulcolax) 10 mg suppository Insert 1 suppository (10 mg) into the rectum once daily as needed for constipation.   Unknown    fluocinolone (Synalar) 0.01 % external solution Apply 1 Application topically every 12 hours if needed (dry scalp).   Unknown    magnesium hydroxide (Milk of Magnesia) 400 mg/5 mL  suspension Take 30 mL by mouth once daily as needed for constipation.   Unknown    sodium chloride (Deep Sea Nasal) 0.65 % nasal spray Administer 1 spray into each nostril every 6 hours if needed for congestion.   Unknown    sodium phosphates (Fleets) 19-7 gram/118 mL enema enema Insert 133 mL (1 enema) into the rectum 1 time if needed for constipation.   Unknown     Penicillins  Social History     Tobacco Use    Smoking status: Never    Smokeless tobacco: Never     No family history on file.    Scheduled Medications:   azithromycin, 500 mg, intravenous, Daily  docusate sodium, 100 mg, orogastric tube, BID  etomidate, 15 mg, intravenous, Once  fludrocortisone, 0.1 mg, oral, Daily  heparin (porcine), 5,000 Units, subcutaneous, q8h  hydrocortisone sodium succinate, 50 mg, intravenous, q6h  insulin lispro, 0-5 Units, subcutaneous, q4h  ipratropium-albuteroL, 3 mL, nebulization, q6h  lactulose, 20 g, oral, TID  meropenem, 500 mg, intravenous, q12h  oxygen, , inhalation, Continuous - Inhalation  pantoprazole, 40 mg, oral, Daily before breakfast   Or  pantoprazole, 40 mg, intravenous, Daily before breakfast  polyethylene glycol, 17 g, orogastric tube, Daily  rocuronium, 40 mg, intravenous, Once         Continuous Medications:   dexmedeTOMIDine, 0-1.5 mcg/kg/hr, Last Rate: 0.2 mcg/kg/hr (11/18/24 0734)  fentaNYL, 0-300 mcg/hr, Last Rate: 125 mcg/hr (11/18/24 1006)  norepinephrine, 0-1 mcg/kg/min, Last Rate: 0.5 mcg/kg/min (11/18/24 1035)  sodium bicarbonate, 150 mL/hr, Last Rate: 150 mL/hr (11/18/24 1219)  vasopressin, 0.04 Units/min, Last Rate: 0.04 Units/min (11/18/24 0929)         PRN Medications:   PRN medications: dextrose, dextrose, fentaNYL, glucagon, glucagon, vancomycin    Objective   Vitals:  Most Recent:  Vitals:    11/18/24 0900   BP:    Pulse: 98   Resp: 18   Temp:    SpO2: 94%       24hr Min/Max:  Temp  Min: 36.6 °C (97.9 °F)  Max: 37.7 °C (99.9 °F)  Pulse  Min: 98  Max: 132  BP  Min: 65/48  Max: 158/77  Resp   Min: 18  Max: 50  SpO2  Min: 75 %  Max: 100 %    LDA:  CVC 11/18/24 Triple lumen (Active)   Placement Date/Time: 11/18/24 (c) 0535   Hand Hygiene Performed Prior to CVC Insertion: Yes  Site Prep: Chlorhexidine   Site Prep Agent has Completely Dried Before Insertion: Yes  All 5 Sterile Barriers Used (Gloves, Gown, Cap, Mask, Large Sterile Olya...   Number of days: 0       CVC 11/18/24 Right Internal jugular (Active)   Placement Date/Time: 11/18/24 0853   Orientation: Right  Location: Internal jugular  Placed by: [facility]  Securement Method: Sutured  Number of Sutures Placed: 2   Number of days: 0       Arterial Line 11/18/24 Right Radial (Active)   Placement Date/Time: 11/18/24 0500   Size: 20 G  Orientation: Right  Location: Radial  Technique: Ultrasound guidance   Number of days: 0       ETT  7.5 mm (Active)   Placement Date/Time: 11/18/24 0225   Hand Hygiene Completed: Yes  Mask Ventilation: Vent by mask  Technique: Video laryngoscopy;Rapid sequence  ETT Type: ETT - single  Single Lumen Tube Size: 7.5 mm  Cuffed: Yes  Laryngoscope: Krystal  Location: Ora...   Number of days: 0       Urethral Catheter (Active)   Placement Date/Time: 11/17/24 (c) 0000   Placed by External Staff?: Nursing home   Number of days: 1       NG/OG/Feeding Tube OG - Coal sump 16 Fr Center mouth (Active)   Placement Date/Time: 11/18/24 0230   Hand Hygiene Completed: Yes  Type of Tube: NG/OG Tube  Tube Length: 57 cm  Tube Type: OG - Coal sump  NG/OG Tube Size: 16 Fr  Tube Location: Center mouth   Number of days: 0         Vent settings:  Vent Mode: Volume control/assist control  FiO2 (%):  [60 %] 60 %  S RR:  [12-20] 20  S VT:  [500 mL] 500 mL  PEEP/CPAP (cm H2O):  [8 cm H20] 8 cm H20  MAP (cm H2O):  [10-12] 12    Hemodynamic parameters for last 24 hours:       I/O:    Intake/Output Summary (Last 24 hours) at 11/18/2024 1222  Last data filed at 11/18/2024 1000  Gross per 24 hour   Intake 974.84 ml   Output 450 ml   Net 524.84 ml       Physical  Exam:     Physical Exam  HENT:      Head: Normocephalic.      Nose: Nose normal.   Cardiovascular:      Rate and Rhythm: Tachycardia present.      Pulses: Normal pulses.   Pulmonary:      Effort: Pulmonary effort is normal.      Comments: Patient intubated  Abdominal:      General: Abdomen is flat. There is distension.   Skin:     General: Skin is warm.      Capillary Refill: Capillary refill takes less than 2 seconds.   Neurological:      Comments: Patient heavily sedated         Lab/Radiology/Diagnostic Review:  Results for orders placed or performed during the hospital encounter of 11/17/24 (from the past 24 hours)   CBC and Auto Differential   Result Value Ref Range    WBC 8.0 4.4 - 11.3 x10*3/uL    nRBC 0.0 0.0 - 0.0 /100 WBCs    RBC 5.26 4.50 - 5.90 x10*6/uL    Hemoglobin 14.9 13.5 - 17.5 g/dL    Hematocrit 45.4 41.0 - 52.0 %    MCV 86 80 - 100 fL    MCH 28.3 26.0 - 34.0 pg    MCHC 32.8 32.0 - 36.0 g/dL    RDW 15.3 (H) 11.5 - 14.5 %    Platelets 143 (L) 150 - 450 x10*3/uL    Immature Granulocytes %, Automated 0.6 0.0 - 0.9 %    Immature Granulocytes Absolute, Automated 0.05 0.00 - 0.70 x10*3/uL   Comprehensive Metabolic Panel   Result Value Ref Range    Glucose 90 74 - 99 mg/dL    Sodium 135 (L) 136 - 145 mmol/L    Potassium 3.5 3.5 - 5.3 mmol/L    Chloride 97 (L) 98 - 107 mmol/L    Bicarbonate 22 21 - 32 mmol/L    Anion Gap 20 10 - 20 mmol/L    Urea Nitrogen 26 (H) 6 - 23 mg/dL    Creatinine 3.76 (H) 0.50 - 1.30 mg/dL    eGFR 17 (L) >60 mL/min/1.73m*2    Calcium 9.1 8.6 - 10.3 mg/dL    Albumin 3.9 3.4 - 5.0 g/dL    Alkaline Phosphatase 129 33 - 136 U/L    Total Protein 7.1 6.4 - 8.2 g/dL     (H) 9 - 39 U/L    Bilirubin, Total 1.7 (H) 0.0 - 1.2 mg/dL     (H) 10 - 52 U/L   Troponin I, High Sensitivity   Result Value Ref Range    Troponin I, High Sensitivity 145 (HH) 0 - 20 ng/L   Manual Differential   Result Value Ref Range    Neutrophils %, Manual 51.0 40.0 - 80.0 %    Bands %, Manual 34.0 0.0 -  5.0 %    Lymphocytes %, Manual 4.0 13.0 - 44.0 %    Monocytes %, Manual 3.0 2.0 - 10.0 %    Eosinophils %, Manual 0.0 0.0 - 6.0 %    Basophils %, Manual 0.0 0.0 - 2.0 %    Metamyelocytes %, Manual 6.0 0.0 - 0.0 %    Myelocytes %, Manual 2.0 0.0 - 0.0 %    Seg Neutrophils Absolute, Manual 4.08 1.20 - 7.00 x10*3/uL    Bands Absolute, Manual 2.72 (H) 0.00 - 0.70 x10*3/uL    Lymphocytes Absolute, Manual 0.32 (L) 1.20 - 4.80 x10*3/uL    Monocytes Absolute, Manual 0.24 0.10 - 1.00 x10*3/uL    Eosinophils Absolute, Manual 0.00 0.00 - 0.70 x10*3/uL    Basophils Absolute, Manual 0.00 0.00 - 0.10 x10*3/uL    Metamyelocytes Absolute, Manual 0.48 0.00 - 0.00 x10*3/uL    Myelocytes Absolute, Manual 0.16 0.00 - 0.00 x10*3/uL    Total Cells Counted 100     Neutrophils Absolute, Manual 6.80 1.20 - 7.70 x10*3/uL    RBC Morphology See Below     [place] Cells Few    POCT GLUCOSE   Result Value Ref Range    POCT Glucose 77 74 - 99 mg/dL   ECG 12 lead   Result Value Ref Range    Ventricular Rate 104 BPM    Atrial Rate 87 BPM    QRS Duration 78 ms    QT Interval 504 ms    QTC Calculation(Bazett) 662 ms    R Axis 25 degrees    T Axis 68 degrees    QRS Count 17 beats    Q Onset 224 ms    T Offset 476 ms    QTC Fredericia 605 ms   Blood Gas Arterial Full Panel Unsolicited   Result Value Ref Range    POCT pH, Arterial 7.30 (L) 7.38 - 7.42 pH    POCT pCO2, Arterial 31 (L) 38 - 42 mm Hg    POCT pO2, Arterial 55 (L) 85 - 95 mm Hg    POCT SO2, Arterial 87 (L) 94 - 100 %    POCT Oxy Hemoglobin, Arterial 85.1 (L) 94.0 - 98.0 %    POCT Hematocrit Calculated, Arterial 41.0 41.0 - 52.0 %    POCT Sodium, Arterial 130 (L) 136 - 145 mmol/L    POCT Potassium, Arterial 3.6 3.5 - 5.3 mmol/L    POCT Chloride, Arterial 99 98 - 107 mmol/L    POCT Ionized Calcium, Arterial 1.19 1.10 - 1.33 mmol/L    POCT Glucose, Arterial 92 74 - 99 mg/dL    POCT Lactate, Arterial 6.0 (HH) 0.4 - 2.0 mmol/L    POCT Base Excess, Arterial -9.9 (L) -2.0 - 3.0 mmol/L    POCT HCO3  Calculated, Arterial 15.3 (L) 22.0 - 26.0 mmol/L    POCT Hemoglobin, Arterial 13.7 13.5 - 17.5 g/dL    POCT Anion Gap, Arterial 19 10 - 25 mmo/L    Patient Temperature      Critical Called By NK     Critical Called To [facility]     Critical Call Time 6946     Critical Read Back Y     Site of Arterial Puncture LR     Nelson's Test YES    Lactate   Result Value Ref Range    Lactate 6.7 (HH) 0.4 - 2.0 mmol/L   Blood Culture    Specimen: Peripheral Venipuncture; Blood culture   Result Value Ref Range    Blood Culture Loaded on Instrument - Culture in progress    Urinalysis with Reflex Culture and Microscopic   Result Value Ref Range    Color, Urine Red (N) Straw, Yellow    Appearance, Urine Turbid (N) Clear    Specific Gravity, Urine 1.025 1.005 - 1.035    pH, Urine 7.0 5.0, 5.5, 6.0, 6.5, 7.0, 7.5, 8.0    Protein, Urine 100 (2+) (A) NEGATIVE, 10 (TRACE) mg/dL    Glucose, Urine NEGATIVE NEGATIVE mg/dL    Blood, Urine 1.0 (3+) (A) NEGATIVE    Ketones, Urine NEGATIVE NEGATIVE mg/dL    Bilirubin, Urine NEGATIVE NEGATIVE    Urobilinogen, Urine NORM NORM mg/dL    Nitrite, Urine NEGATIVE NEGATIVE    Leukocyte Esterase, Urine 250 Talat/µL (A) NEGATIVE   Extra Urine Gray Tube   Result Value Ref Range    Extra Tube Hold for add-ons.    Microscopic Only, Urine   Result Value Ref Range    WBC, Urine 1-5 1-5, NONE /HPF    RBC, Urine >20 (A) NONE, 1-2, 3-5 /HPF    Squamous Epithelial Cells, Urine 1-9 (SPARSE) Reference range not established. /HPF   Type and screen   Result Value Ref Range    ANTIBODY SCREEN NEG    Abo/Rh   Result Value Ref Range    ABO TYPE O     Rh TYPE POS    Blood Culture    Specimen: Peripheral Venipuncture; Blood culture   Result Value Ref Range    Blood Culture Loaded on Instrument - Culture in progress    Lactate   Result Value Ref Range    Lactate 6.4 (HH) 0.4 - 2.0 mmol/L   Renal function panel   Result Value Ref Range    Glucose 84 74 - 99 mg/dL    Sodium 133 (L) 136 - 145 mmol/L    Potassium 3.8 3.5 - 5.3 mmol/L     Chloride 101 98 - 107 mmol/L    Bicarbonate 18 (L) 21 - 32 mmol/L    Anion Gap 18 10 - 20 mmol/L    Urea Nitrogen 28 (H) 6 - 23 mg/dL    Creatinine 3.67 (H) 0.50 - 1.30 mg/dL    eGFR 18 (L) >60 mL/min/1.73m*2    Calcium 8.4 (L) 8.6 - 10.3 mg/dL    Phosphorus 3.0 2.5 - 4.9 mg/dL    Albumin 3.3 (L) 3.4 - 5.0 g/dL   Troponin I, High Sensitivity   Result Value Ref Range    Troponin I, High Sensitivity 128 (HH) 0 - 20 ng/L   Protime-INR   Result Value Ref Range    Protime 18.7 (H) 9.8 - 12.8 seconds    INR 1.7 (H) 0.9 - 1.1   VERIFY ABO/Rh Group Test   Result Value Ref Range    ABO TYPE O     Rh TYPE POS    Acute Toxicology Panel, Blood   Result Value Ref Range    Acetaminophen <10.0 10.0 - 30.0 ug/mL    Salicylate  <3 4 - 20 mg/dL    Alcohol <10 <=10 mg/dL   ECG 12 lead   Result Value Ref Range    Ventricular Rate 110 BPM    Atrial Rate 110 BPM    SD Interval 168 ms    QRS Duration 78 ms    QT Interval 362 ms    QTC Calculation(Bazett) 489 ms    P Axis 21 degrees    R Axis 11 degrees    T Axis 53 degrees    QRS Count 18 beats    Q Onset 225 ms    P Onset 141 ms    P Offset 186 ms    T Offset 406 ms    QTC Fredericia 443 ms   Blood Gas Lactic Acid, Venous   Result Value Ref Range    POCT Lactate, Venous 7.6 (HH) 0.4 - 2.0 mmol/L    Critical Called By NK     Critical Called To FN     Critical Call Time 17     Critical Read Back Y    Blood Gas Venous Full Panel   Result Value Ref Range    POCT pH, Venous 7.18 (LL) 7.33 - 7.43 pH    POCT pCO2, Venous 46 41 - 51 mm Hg    POCT pO2, Venous 35 35 - 45 mm Hg    POCT SO2, Venous 54 45 - 75 %    POCT Oxy Hemoglobin, Venous 53.2 45.0 - 75.0 %    POCT Hematocrit Calculated, Venous 44.0 41.0 - 52.0 %    POCT Sodium, Venous 128 (L) 136 - 145 mmol/L    POCT Potassium, Venous 5.0 3.5 - 5.3 mmol/L    POCT Chloride, Venous 97 (L) 98 - 107 mmol/L    POCT Ionized Calicum, Venous 1.14 1.10 - 1.33 mmol/L    POCT Glucose, Venous 75 74 - 99 mg/dL    POCT Lactate, Venous 7.6 (HH) 0.4 - 2.0  mmol/L    POCT Base Excess, Venous -11.0 (L) -2.0 - 3.0 mmol/L    POCT HCO3 Calculated, Venous 17.2 (L) 22.0 - 26.0 mmol/L    POCT Hemoglobin, Venous 14.5 13.5 - 17.5 g/dL    POCT Anion Gap, Venous 19.0 10.0 - 25.0 mmol/L    Patient Temperature      FiO2 90 %    Critical Called By NK     Critical Called To FN     Critical Call Time 17     Critical Read Back Y    Lactate   Result Value Ref Range    Lactate 7.2 (HH) 0.4 - 2.0 mmol/L   Blood Gas Arterial Full Panel   Result Value Ref Range    POCT pH, Arterial 7.18 (LL) 7.38 - 7.42 pH    POCT pCO2, Arterial 43 (H) 38 - 42 mm Hg    POCT pO2, Arterial 84 (L) 85 - 95 mm Hg    POCT SO2, Arterial 95 94 - 100 %    POCT Oxy Hemoglobin, Arterial 93.7 (L) 94.0 - 98.0 %    POCT Hematocrit Calculated, Arterial 46.0 41.0 - 52.0 %    POCT Sodium, Arterial 130 (L) 136 - 145 mmol/L    POCT Potassium, Arterial 4.5 3.5 - 5.3 mmol/L    POCT Chloride, Arterial 98 98 - 107 mmol/L    POCT Ionized Calcium, Arterial 1.17 1.10 - 1.33 mmol/L    POCT Glucose, Arterial 105 (H) 74 - 99 mg/dL    POCT Lactate, Arterial 5.5 (HH) 0.4 - 2.0 mmol/L    POCT Base Excess, Arterial -12.0 (L) -2.0 - 3.0 mmol/L    POCT HCO3 Calculated, Arterial 16.0 (L) 22.0 - 26.0 mmol/L    POCT Hemoglobin, Arterial 15.3 13.5 - 17.5 g/dL    POCT Anion Gap, Arterial 21 10 - 25 mmo/L    Patient Temperature      FiO2 60 %    Ventilator Mode A/C     Ventilator Rate 20 bpm    Tidal Volume 500 mL    Peep CHM2O 8.0 cm H2O    Critical Called By STEPHANI JARAMILLO RRT     [facility]     Critical Call Time 258     Critical Read Back Y    Blood Gas Arterial Full Panel   Result Value Ref Range    POCT pH, Arterial 7.26 (L) 7.38 - 7.42 pH    POCT pCO2, Arterial 35 (L) 38 - 42 mm Hg    POCT pO2, Arterial 68 (L) 85 - 95 mm Hg    POCT SO2, Arterial 93 (L) 94 - 100 %    POCT Oxy Hemoglobin, Arterial 91.2 (L) 94.0 - 98.0 %    POCT Hematocrit Calculated, Arterial 44.0 41.0 - 52.0 %    POCT Sodium, Arterial 129 (L) 136 -  145 mmol/L    POCT Potassium, Arterial 4.3 3.5 - 5.3 mmol/L    POCT Chloride, Arterial 98 98 - 107 mmol/L    POCT Ionized Calcium, Arterial 1.11 1.10 - 1.33 mmol/L    POCT Glucose, Arterial 156 (H) 74 - 99 mg/dL    POCT Lactate, Arterial 5.0 (HH) 0.4 - 2.0 mmol/L    POCT Base Excess, Arterial -10.4 (L) -2.0 - 3.0 mmol/L    POCT HCO3 Calculated, Arterial 15.7 (L) 22.0 - 26.0 mmol/L    POCT Hemoglobin, Arterial 14.7 13.5 - 17.5 g/dL    POCT Anion Gap, Arterial 20 10 - 25 mmo/L    Patient Temperature      FiO2 60 %    Ventilator Mode A/C     Ventilator Rate 20 bpm    Tidal Volume 500 mL    Peep CHM2O 8.0 cm H2O    Critical Called By STEPHANI JARAMILLO RRT     Critical Called To KIARA COREAS [facility]     Critical Call Time 545     Critical Read Back Y    MRSA Surveillance for Vancomycin De-escalation, PCR    Specimen: Anterior Nares; Swab   Result Value Ref Range    MRSA PCR Detected (A) Not Detected   Procalcitonin   Result Value Ref Range    Procalcitonin 216.91 (H) <=0.07 ng/mL   Sars-CoV-2 PCR   Result Value Ref Range    Coronavirus 2019, PCR Not Detected Not Detected   Influenza A, and B PCR   Result Value Ref Range    Flu A Result Not Detected Not Detected    Flu B Result Not Detected Not Detected   CBC   Result Value Ref Range    WBC 10.2 4.4 - 11.3 x10*3/uL    nRBC 0.2 (H) 0.0 - 0.0 /100 WBCs    RBC 5.09 4.50 - 5.90 x10*6/uL    Hemoglobin 14.3 13.5 - 17.5 g/dL    Hematocrit 43.8 41.0 - 52.0 %    MCV 86 80 - 100 fL    MCH 28.1 26.0 - 34.0 pg    MCHC 32.6 32.0 - 36.0 g/dL    RDW 15.5 (H) 11.5 - 14.5 %    Platelets 127 (L) 150 - 450 x10*3/uL   Comprehensive Metabolic Panel   Result Value Ref Range    Glucose 156 (H) 74 - 99 mg/dL    Sodium 131 (L) 136 - 145 mmol/L    Potassium 4.1 3.5 - 5.3 mmol/L    Chloride 99 98 - 107 mmol/L    Bicarbonate 19 (L) 21 - 32 mmol/L    Anion Gap 17 10 - 20 mmol/L    Urea Nitrogen 37 (H) 6 - 23 mg/dL    Creatinine 3.97 (H) 0.50 - 1.30 mg/dL    eGFR 16 (L) >60 mL/min/1.73m*2    Calcium 7.8 (L)  8.6 - 10.3 mg/dL    Albumin 3.3 (L) 3.4 - 5.0 g/dL    Alkaline Phosphatase 95 33 - 136 U/L    Total Protein 5.9 (L) 6.4 - 8.2 g/dL     (H) 9 - 39 U/L    Bilirubin, Total 3.3 (H) 0.0 - 1.2 mg/dL     (H) 10 - 52 U/L   Magnesium   Result Value Ref Range    Magnesium 1.42 (L) 1.60 - 2.40 mg/dL   Phosphorus   Result Value Ref Range    Phosphorus 4.8 2.5 - 4.9 mg/dL   POCT GLUCOSE   Result Value Ref Range    POCT Glucose 139 (H) 74 - 99 mg/dL   Transthoracic Echo (TTE) Complete   Result Value Ref Range    AV pk manjit 1.08 m/s    LV Biplane EF 20 %    LVOT diam 2.02 cm    MV E/A ratio 0.79     LA vol index A/L 18.6 ml/m2    LV EF 35 %    RVSP 28.6 mmHg    LVIDd 5.43 cm    Aortic Valve Area by Continuity of Peak Velocity 2.88 cm2    AV pk grad 5 mmHg    LV A4C EF 16.6    Drug Screen, Urine   Result Value Ref Range    Amphetamine Screen, Urine Presumptive Negative Presumptive Negative    Barbiturate Screen, Urine Presumptive Negative Presumptive Negative    Benzodiazepines Screen, Urine Presumptive Positive (A) Presumptive Negative    Cannabinoid Screen, Urine Presumptive Positive (A) Presumptive Negative    Cocaine Metabolite Screen, Urine Presumptive Negative Presumptive Negative    Fentanyl Screen, Urine Presumptive Positive (A) Presumptive Negative    Opiate Screen, Urine Presumptive Negative Presumptive Negative    Oxycodone Screen, Urine Presumptive Negative Presumptive Negative    PCP Screen, Urine Presumptive Negative Presumptive Negative    Methadone Screen, Urine Presumptive Negative Presumptive Negative   Lavender Top   Result Value Ref Range    Extra Tube Hold for add-ons.    Ammonia   Result Value Ref Range    Ammonia 70 (H) 16 - 53 umol/L   Blood Gas Arterial Full Panel   Result Value Ref Range    POCT pH, Arterial 7.30 (L) 7.38 - 7.42 pH    POCT pCO2, Arterial 30 (L) 38 - 42 mm Hg    POCT pO2, Arterial 73 (L) 85 - 95 mm Hg    POCT SO2, Arterial 96 94 - 100 %    POCT Oxy Hemoglobin, Arterial 93.5  (L) 94.0 - 98.0 %    POCT Hematocrit Calculated, Arterial 44.0 41.0 - 52.0 %    POCT Sodium, Arterial 127 (L) 136 - 145 mmol/L    POCT Potassium, Arterial 4.1 3.5 - 5.3 mmol/L    POCT Chloride, Arterial 95 (L) 98 - 107 mmol/L    POCT Ionized Calcium, Arterial 1.07 (L) 1.10 - 1.33 mmol/L    POCT Glucose, Arterial 240 (H) 74 - 99 mg/dL    POCT Lactate, Arterial 5.7 (HH) 0.4 - 2.0 mmol/L    POCT Base Excess, Arterial -10.2 (L) -2.0 - 3.0 mmol/L    POCT HCO3 Calculated, Arterial 14.8 (L) 22.0 - 26.0 mmol/L    POCT Hemoglobin, Arterial 14.7 13.5 - 17.5 g/dL    POCT Anion Gap, Arterial 21 10 - 25 mmo/L    Patient Temperature      FiO2 60 %    Ventilator Rate 20 bpm    Tidal Volume 500 mL    Peep CHM2O 8.0 cm H2O    Critical Called By ARON     Critical Called To DR ROTHMAN     Critical Call Time 949     Critical Read Back Y    Comprehensive metabolic panel   Result Value Ref Range    Glucose 261 (H) 74 - 99 mg/dL    Sodium 127 (L) 136 - 145 mmol/L    Potassium 3.8 3.5 - 5.3 mmol/L    Chloride 96 (L) 98 - 107 mmol/L    Bicarbonate 19 (L) 21 - 32 mmol/L    Anion Gap 16 10 - 20 mmol/L    Urea Nitrogen 40 (H) 6 - 23 mg/dL    Creatinine 3.73 (H) 0.50 - 1.30 mg/dL    eGFR 18 (L) >60 mL/min/1.73m*2    Calcium 8.0 (L) 8.6 - 10.3 mg/dL    Albumin 3.1 (L) 3.4 - 5.0 g/dL    Alkaline Phosphatase 79 33 - 136 U/L    Total Protein 5.6 (L) 6.4 - 8.2 g/dL     (H) 9 - 39 U/L    Bilirubin, Total 3.4 (H) 0.0 - 1.2 mg/dL     (H) 10 - 52 U/L   POCT GLUCOSE   Result Value Ref Range    POCT Glucose 190 (H) 74 - 99 mg/dL     Transthoracic Echo (TTE) Complete    Result Date: 11/18/2024            [facility] [place] [address], [facility] [address]    Tel [phone] Fax [phone] TRANSTHORACIC ECHOCARDIOGRAM REPORT Patient Name:       CARMELO Turpin Physician:    [id number] Carmelo Harrison MD Study Date:         11/18/2024           Ordering Provider:     [id number] KIARA COREAS MRN/PID:            [id number]             Fellow: Accession#:         [id number]         Nurse:                Nya Esquivel RN Date of Birth/Age:  1960 / 64 years Sonographer:           Gender Assigned at                      Additional Staff: Birth: Height:             167.64 cm            Admit Date: Weight:             97.98 kg             Admission Status:     Inpatient -                                                                Routine BSA / BMI:          2.07 m2 / 34.86      Department Location:  [place] [facility]                     kg/m2                                      (27-34) Blood Pressure: 93 /60 mmHg Study Type:    TRANSTHORACIC ECHO (TTE) COMPLETE Diagnosis/ICD: Severe sepsis with septic shock-R65.21 Indication:    septic shock CPT Codes:     Echo Complete w Full Doppler-[id number]  Study Detail: The following Echo studies were performed: 2D, M-Mode, Doppler and               color flow. Technically challenging study due to patient lying in               supine position, body habitus and poor acoustic windows. The               patient is intubated. Definity used as a contrast agent for               endocardial border definition. Total contrast used for this               procedure was 3 mL via IV push. The patient was sedated.  PHYSICIAN INTERPRETATION: Left Ventricle: Left ventricular ejection fraction is moderately decreased, by visual estimate at 35%. There is global hypokinesis of the left ventricle with minor regional variations. The left ventricular cavity size is normal. There is mild increased septal and normal posterior left ventricular wall thickness. Spectral Doppler shows a Grade I (impaired relaxation pattern) of left ventricular diastolic filling with normal left atrial filling pressure. Left Atrium: The left atrium is normal  in size. Right Ventricle: The right ventricle is normal in size. There is normal right ventricular global systolic function. Right Atrium: The right atrium is normal in size. Aortic Valve: The aortic valve is trileaflet. There is no evidence of aortic valve regurgitation. The peak instantaneous gradient of the aortic valve is 5 mmHg. Mitral Valve: The mitral valve is normal in structure. There is no evidence of mitral valve regurgitation. Tricuspid Valve: The tricuspid valve is structurally normal. There is mild tricuspid regurgitation. The Doppler estimated RVSP is within normal limits at 28.6 mmHg. Pulmonic Valve: The pulmonic valve is structurally normal. There is no indication of pulmonic valve regurgitation. Pericardium: No pericardial effusion noted. Aorta: The aortic root is normal.  CONCLUSIONS:  1. Left ventricular ejection fraction is moderately decreased, by visual estimate at 35%.  2. There is global hypokinesis of the left ventricle with minor regional variations.  3. There is normal right ventricular global systolic function.  4. Right ventricular within normal limits. QUANTITATIVE DATA SUMMARY:  2D MEASUREMENTS:            Normal Ranges: LAs:             3.63 cm    (2.7-4.0cm) IVSd:            1.04 cm    (0.6-1.1cm) LVPWd:           0.98 cm    (0.6-1.1cm) LVIDd:           5.43 cm    (3.9-5.9cm) LVIDs:           4.30 cm LV Mass Index:   102.4 g/m2 LV % FS          20.9 %  LA VOLUME:                   Normal Ranges: LA Vol A4C:       28.4 ml    (22+/-6mL/m2) LA Vol A2C:       45.1 ml LA Vol BP:        38.5 ml LA Vol Index A4C: 13.7 ml/m2 LA Vol Index A2C: 21.8 ml/m2 LA Vol Index BP:  18.6 ml/m2 LA Vol A4C:       27.2 ml LA Vol A2C:       43.0 ml LA Vol Index BSA: 17.0 ml/m2  M-MODE MEASUREMENTS:         Normal Ranges: AoV Exc:             2.02 cm (1.5-2.5cm)  AORTA MEASUREMENTS:         Normal Ranges: AoV Exc:            2.02 cm (1.5-2.5cm)  LV SYSTOLIC FUNCTION BY 2D PLANIMETRY (MOD):                       Normal Ranges: EF-A4C View:    17 % (>=55%) EF-A2C View:    34 % EF-Biplane:     20 % EF-Visual:      35 % LV EF Reported: 35 %  LV DIASTOLIC FUNCTION:          Normal Ranges: MV Peak E:             0.68 m/s (0.7-1.2 m/s) MV Peak A:             0.86 m/s (0.42-0.7 m/s) E/A Ratio:             0.79     (1.0-2.2)  MITRAL VALVE:          Normal Ranges: MV DT:        138 msec (150-240msec)  AORTIC VALVE:           Normal Ranges: AoV Vmax:      1.08 m/s (<=1.7m/s) AoV Peak P.7 mmHg (<20mmHg) LVOT Max Allan:  0.97 m/s (<=1.1m/s) LVOT Diameter: 2.02 cm  (1.8-2.4cm) AoV Area,Vmax: 2.88 cm2 (2.5-4.5cm2)  RIGHT VENTRICLE: RV Major 7.6 cm  TRICUSPID VALVE/RVSP:          Normal Ranges: Peak TR Velocity:     2.53 m/s RV Syst Pressure:     29 mmHg  (< 30mmHg) IVC Diam:             1.94 cm  AORTA: Asc Ao Diam 3.48 cm  [id number] Robel Harrison MD Electronically signed on 2024 at 9:51:24 AM  ** Final **     ECG 12 lead    Result Date: 2024  Sinus tachycardia Nonspecific T wave abnormality Abnormal ECG When compared with ECG of 2024 21:37, (unconfirmed) Sinus rhythm has replaced Junctional rhythm Nonspecific T wave abnormality now evident in Inferior leads    ECG 12 lead    Result Date: 2024  Accelerated Junctional rhythm Nonspecific ST and T wave abnormality Abnormal ECG When compared with ECG of 21-MAY-2023 08:36, Junctional rhythm has replaced Sinus rhythm Vent. rate has increased BY  37 BPM Nonspecific T wave abnormality now evident in Lateral leads QT has lengthened    XR chest 1 view    Result Date: 2024  Interpreted By:  Lionel Sanders, STUDY: XR CHEST 1 VIEW;  2024 6:07 am   INDICATION: Signs/Symptoms:RIJ CVC.   COMPARISON: None.   ACCESSION NUMBER(S): [id number]   ORDERING CLINICIAN: KIARA COREAS   FINDINGS:   CARDIOMEDIASTINAL SILHOUETTE: Cardiomediastinal silhouette is normal in size and configuration.   LUNGS/PLEURA: Mild bibasilar subsegmental atelectasis. There are no  consolidations.There are no pleural effusions. There is no demonstrated pneumothorax.   LINES/TUBES: Interval placement of right internal jugular central venous catheter terminating in the superior vena cava. Endotracheal tube terminates 3.5 cm above the bryant. Enteric tube courses below the diaphragm outside of the field of view.   BONES: No evidence of acute osseous abnormality.       1.  Interval placement of right internal jugular central venous catheter terminating in superior vena cava. Endotracheal and enteric tube in place.     Signed by: Lionel Sanders 11/18/2024 6:23 AM Dictation workstation:   [id number]    XR chest 1 view    Result Date: 11/18/2024  Interpreted By:  Gregorio Saenz, STUDY: XR CHEST 1 VIEW; XR ABDOMEN 1 VIEW;  11/18/2024 2:48 am   INDICATION: Signs/Symptoms:ETT; Signs/Symptoms:OGT.   COMPARISON: 5/21/2023   ACCESSION NUMBER(S): [id number]; [id number]   ORDERING CLINICIAN: KIARA COREAS   FINDINGS: Endotracheal tube tip terminates approximately 4.7 cm superior to the bryant. Nasogastric tube tip terminates in the left upper abdomen at the level of the stomach. The cardiac silhouette is stable in size. There is asymmetric opacity in the right upper lung and also right basilar opacity. No significant pleural effusion. No pneumothorax. There is nonspecific bowel gas pattern.       Satisfactory position endotracheal tube and nasogastric tube.   Asymmetric opacity in the right upper lung and also right basilar opacity concerning for infiltrate.   MACRO: None   Signed by: Gregorio Saenz 11/18/2024 2:52 AM Dictation workstation:   [id number]    XR abdomen 1 view    Result Date: 11/18/2024  Interpreted By:  Gregorio Saenz, STUDY: XR CHEST 1 VIEW; XR ABDOMEN 1 VIEW;  11/18/2024 2:48 am   INDICATION: Signs/Symptoms:ETT; Signs/Symptoms:OGT.   COMPARISON: 5/21/2023   ACCESSION NUMBER(S): [id number]; [id number]   ORDERING CLINICIAN: KIARA COREAS   FINDINGS: Endotracheal tube tip terminates  approximately 4.7 cm superior to the bryant. Nasogastric tube tip terminates in the left upper abdomen at the level of the stomach. The cardiac silhouette is stable in size. There is asymmetric opacity in the right upper lung and also right basilar opacity. No significant pleural effusion. No pneumothorax. There is nonspecific bowel gas pattern.       Satisfactory position endotracheal tube and nasogastric tube.   Asymmetric opacity in the right upper lung and also right basilar opacity concerning for infiltrate.   MACRO: None   Signed by: Gregorio Saenz 11/18/2024 2:52 AM Dictation workstation:   [id number]    CT angio chest abdomen pelvis    Result Date: 11/18/2024  Interpreted By:  Gregorio Saenz, STUDY: CT ANGIO CHEST ABDOMEN PELVIS;  11/17/2024 11:42 pm   INDICATION: Signs/Symptoms:Hypotensive, hypoxic, traumatic Ayon, blood loss.   COMPARISON: 10/3/2023   ACCESSION NUMBER(S): [id number]   ORDERING CLINICIAN: PAZ POTTER   TECHNIQUE: Contiguous axial images of the chest, abdomen and pelvis were obtained with and without intravenous contrast. Coronal and sagittal reformatted images were obtained from the axial images. MIPS and 3D reformatted images were also performed and reviewed.   FINDINGS: CT CHEST:   The examination is limited secondary to body habitus, motion, and beam hardening artifact secondary to inferior position of the patient's arms.   No axillary, mediastinal, hilar lymphadenopathy.   The heart is normal in size. No significant pericardial effusion. No evidence of acute central, main, or lobar pulmonary embolism. Evaluation for more distal emboli is limited secondary to patient respiratory motion and lung parenchymal opacities. No evidence of aortic aneurysm or dissection. The ascending aorta measures 3.1 cm in diameter.   Evaluation of the lungs is limited secondary to respiratory motion. There are atelectatic/consolidative opacities in the bilateral lower lobes. No significant pleural  effusion. No pneumothorax.   Multilevel degenerative change of the thoracic spine.   CT ABDOMEN PELVIS:   There is hepatic steatosis. Postsurgical change of cholecystectomy.   The pancreas, spleen, and adrenal glands appear unremarkable.   Symmetric enhancement of the kidneys. A subcentimeter hypodensity in the lower pole the right kidney is too small to characterize. No hydronephrosis.   No evidence of abdominal aortic aneurysm or dissection.   No evidence of bowel obstruction the appendix is surgically absent. There is a moderate amount of stool in the rectum.   There is a Ayon catheter and the urinary bladder is underdistended and not well evaluated. There is however evidence of urinary bladder wall thickening and edema.   Band of density in the midline anterior pelvic wall set site of previously seen suprapubic bladder catheter.   Bilateral fat containing inguinal hernias.   Multilevel degenerative change of the lumbar spine.       No evidence of aortic aneurysm or dissection.   No evidence of acute pulmonary embolism.   Atelectatic/consolidative opacities in the bilateral lower lobes.   Hepatic steatosis and postsurgical change of cholecystectomy.   A Ayon catheter is present and the urinary bladder is underdistended and not well evaluated. There is however urinary bladder wall thickening and edema compatible cystitis and clinical correlation with urinalysis is recommended.   Band of density in the midline anterior pelvic wall at site of previously seen suprapubic urinary bladder catheter.   Moderate amount of stool in the sigmoid colon and rectum.   MACRO: None   Signed by: Gregorio Saenz 11/18/2024 12:45 AM Dictation workstation:   [id number]      This patient has a central line   Reason for the central line remaining today? Hemodynamic monitoring and Parenteral medication    This patient has a urinary catheter   Reason for the urinary catheter remaining today? critically ill patient who need accurate  urinary output measurements    This patient is intubated   Reason for patient to remain intubated today? they are unable to protect their airway             Assessment/Plan   Principal Problem:    Septic shock (Multi)  Active Problems:    Acute hypoxic respiratory failure (Multi)    Pneumonia    RAYNE (acute kidney injury) (CMS-[facility])    Metabolic acidosis    Plan  Neuro:  #Encephalopathy  #Hx of paraplegia  -A&Ox3 but lethargic   -Now intubated and sedated on fentanyl infusion  -CAM ICU assessment and ABCDEF bundle  -PT/OT when able     CV:  #Septic shock  #NSTEMI  #Hx of HTN  #Hx of HLD  -Continuous cardiac telemetry and Q1 vitals  -Placed arterial line and central line for close hemodynamic monitoring and vasopressors  -Continue norepinephrine and titrate for goal MAP > 65  -Start vasopressin and stress dose steroids  -EKG sinus tachycardia, Qtc 489, , no ST elevation   -troponin 145->128  -Last ECHO 11/22: EF 50-55%, impaired laxation pattern of LV diastolic filling  -Stress test: 5/2023 normal with EF > 65%  -ECHO ordered     Pulm:  #Acute hypoxic respiratory failure  #Pneumonia  #Hx of asthma  -Was on BIPAP 100% FiO2 in ED  -Intubated on arrival to ICU for altered mental status and tachypnea/ongoing respiratory distress   -CXR: Asymmetric opacity in the right upper lung and also right basilar  opacity concerning for infiltrate.  -CT: atelectasis/consolidative opacities in bilateral lower lobes   -MV: ACVC  RR 20 FiO2 60 PEEP 8  -Maintain pulse ox > 92%  -Duonebs and bronchial hygiene     :  #RAYNE  #Hx of chronic urinary retention with smith  #Lactic acidosis  #Metabolic acidosis  #Hematuria  -BUN/Cr 26/3.76 (Last Cr 0.93 on 3/16/2024)  -Lactate 6.7->7.2->5.0  -Initial ABG post intubation: ph 7.18/pCO2 43/lactate 5.5, improved to ph 7.26/pCO2 35/lactate 5.0  -CT: Smith present, urinary bladder under distended, urinary bladder wall thickening and edema compatible with cystitis  -Monitor daily  BMP  -Strict I/Os, maintain chronic smith  -Smith was flushed and some blood clots aspirated on arrival to ICU  -Intravenous hydration with sodium bicarb at 100 per hour  -Monitor and replace electrolytes per protocol     GI:  #Transaminitis  #Hx of GERD  -, , bilirubin 1.7, trend  -NPO  -OGT to LIWS  -GI prophylaxis: Protonix  -Bowel regimen: Colace and Miralax  -CT: Hepatic steatosis, minimal stool in sigmoid colon rectum     Endo:  -SSI and Accuchecks Q4  -Hypoglycemia protocol     Heme:  -H/H 14.3/43.8   -Monitor daily CBC  -Given TXA and 1 unit PRBC in ED  -DVT Prophylaxis: SCDs, heparin (hold if hematuria worsens)     ID:  #Septic shock  #Pneumonia  -afebrile, no leukocytosis  -Lactate 7.2->5.0  -Blood culture and urine cultures sent  -Urine antigens and procalcitonin pending  -MRSA, Covid/influenza swabs pending  -Obtain sputum culture if able  -CXR: Asymmetric opacity in the right upper lung and also right basilar  opacity concerning for infiltrate.  -CT: atelectasis/consolidative opacities in bilateral lower lobes, urinary bladder wall thickening and edema compatible with cystitis  -UA +leuks, negative nitrites, 1-5 WBCs, >20 RBCs  -Given cefepime, azithromycin, and Vanc in ED, continue broad spectrum antibiotics      ABCDEF Checklist  Analgesia: Spontaneous awakening trial to be pursued if clinically appropriate. RASS goal reviewed  Breathing: Spontaneous breathing trial to be pursued if clinically appropriate. Mechanical power of assisted ventilation reviewed  Choice of analgesia/sedation: Analgesic and sedative agents adjusted per clinical context.   Delirium assessed by CAM, will avoid exacerbating factors  Early mobility and exercise: Physical and occupational therapy engaged  Family: Plan of care, overall trajectory of patient shared with family. Questions elicited and answered as appropriate.       Due to the high probability of life threatening clinical decompensation, the  "patient required critical care time evaluating and managing this patient.  Critical care time included obtaining a history, examining the patient, ordering and reviewing studies, discussing, developing, and implementing a management plan, evaluating the patient's response to treatment, and discussion with other care team providers. I saw and evaluated the patient myself.  Critical care time was performed exclusive of billable procedures.        Dru Cline MD  Critical Care Medicine        This note was partially created using voice recognition software and is inherently subject to errors including those of syntax and \"sound-alike\" substitutions which may escape proofreading. In such instances, original meaning may be extrapolated by contextual derivation.     Attending Addendum:    I saw and evaluated the patient. I personally obtained the key and critical portions of the history and physical exam or was physically present for key and critical portions performed by the resident/fellow. I reviewed the resident/fellow's documentation and discussed the patient with the resident/fellow. I agree with the resident/fellow's medical decision making as documented in the note.    Multisystem multiorgan failure and dysfunction  Ventilator dependent respiratory failure  Circulatory shock, multifactorial cardiogenic septic hypovolemic  Acute blood loss anemia  Severe refractory shock  Acute kidney injury, oliguric  Severe metabolic acidosis       Critical care time is 77 minutes.      "

## 2024-11-18 NOTE — PROCEDURES
Intubation    Date/Time: 11/18/2024 2:10 AM    Performed by: JELLY Traore  Authorized by: JELLY Traore    Consent:     Consent obtained:  Verbal    Consent given by:  Healthcare agent    Risks, benefits, and alternatives were discussed: yes    Universal protocol:     Patient identity confirmed:  Arm band  Pre-procedure details:     Indications: altered consciousness, respiratory distress and respiratory failure      Patient status:  Altered mental status    Pharmacologic strategy: RSI      Induction agents:  Etomidate    Paralytics:  Rocuronium  Procedure details:     Preoxygenation: BiPAP.    Number of attempts:  1  Successful intubation attempt details:     Intubation method:  Oral    Intubation technique: video assisted      Laryngoscope blade:  Hypercurved    Bougie used: no      Grade view: I      Tube size (mm):  7.5    Tube type:  Cuffed    Tube visualized through cords: yes    Placement assessment:     ETT at teeth/gumline (cm):  23    Tube secured with:  ETT park    Breath sounds:  Equal    Placement verification: CXR verification, direct visualization, equal breath sounds and waveform ETCO2      CXR findings:  Appropriate position  Post-procedure details:     Procedure completion:  Tolerated well, no immediate complications

## 2024-11-18 NOTE — PROGRESS NOTES
Nutrition Progress Note:  consult    64 year old male admitted 11/17 from nursing facility with traumatic smith and nausea/vomiting x1; work up revealing PNA, septic shock. Pt intubated 11/18 and dependent on levophed/vasopressin; complicated by MOSF.       Since MNT consult placed, pt family has elected DNRCC with tentative withdraw of care later today. MNT will sign off.     Will respect pt family plan of care.   MNT available to assist PRN.    Time Spent: 15 minutes

## 2024-11-18 NOTE — PROGRESS NOTES
11/18/24 1131   Discharge Planning   Living Arrangements Alone   Support Systems Family members   Assistance Needed total care   Type of Residence Assisted living   Care Facility Name [facility]     Patient arrived from [facility] ([facility] HOWR to come in today and withdrawal care

## 2024-11-18 NOTE — CARE PLAN
Problem: Skin  Goal: Decreased wound size/increased tissue granulation at next dressing change  Outcome: Progressing  Goal: Participates in plan/prevention/treatment measures  Outcome: Progressing  Goal: Prevent/manage excess moisture  Outcome: Progressing  Goal: Prevent/minimize sheer/friction injuries  Outcome: Progressing  Goal: Promote/optimize nutrition  Outcome: Progressing  Goal: Promote skin healing  Outcome: Progressing     Problem: Fall/Injury  Goal: Not fall by end of shift  Outcome: Progressing  Goal: Be free from injury by end of the shift  Outcome: Progressing  Goal: Verbalize understanding of personal risk factors for fall in the hospital  Outcome: Not Progressing  Goal: Verbalize understanding of risk factor reduction measures to prevent injury from fall in the home  Outcome: Not Progressing  Goal: Use assistive devices by end of the shift  Outcome: Progressing  Goal: Pace activities to prevent fatigue by end of the shift  Outcome: Progressing     Problem: Indwelling Catheter Maintenance  Goal: I will have no complications from indwelling catheter  Outcome: Progressing     Problem: Respiratory  Goal: Clear secretions with interventions this shift  Outcome: Progressing  Goal: Minimize anxiety/maximize coping throughout shift  Outcome: Progressing  Goal: Minimal/no exertional discomfort or dyspnea this shift  Outcome: Progressing  Goal: No signs of respiratory distress (eg. Use of accessory muscles. Peds grunting)  Outcome: Progressing  Goal: Patent airway maintained this shift  Outcome: Progressing  Goal: Tolerate mechanical ventilation evidenced by VS/agitation level this shift  Outcome: Progressing  Goal: Tolerate pulmonary toileting this shift  Outcome: Progressing  Goal: Verbalize decreased shortness of breath this shift  Outcome: Not Progressing  Goal: Wean oxygen to maintain O2 saturation per order/standard this shift  Outcome: Progressing  Goal: Increase self care and/or family involvement in  next 24 hours  Outcome: Progressing     Problem: Safety - Medical Restraint  Goal: Remains free of injury from restraints (Restraint for Interference with Medical Device)  Outcome: Progressing  Goal: Free from restraint(s) (Restraint for Interference with Medical Device)  Outcome: Progressing        The clinical goals for the shift include  hemodynamic stability.      Problem: Fall/Injury  Goal: Verbalize understanding of personal risk factors for fall in the hospital  Outcome: Not Progressing  Goal: Verbalize understanding of risk factor reduction measures to prevent injury from fall in the home  Outcome: Not Progressing     Problem: Respiratory  Goal: Verbalize decreased shortness of breath this shift  Outcome: Not Progressing

## 2024-11-18 NOTE — ED PROVIDER NOTES
HPI   Chief Complaint   Patient presents with    Hypotension       Patient is 64-year-old male presenting [facility] ED for acute shortness of breath, nausea, vomiting, after having traumatic Ayon insertion earlier today at his facility.  Patient is DNR CC.  Patient reports that he has been having urinary retention for the last few days, due to possible kinking of his Ayon.  Ayon was changed today earlier at his facility.  Per EMS, there was significant blood loss with the insertion of this Ayon.  Patient shortly after began having episodes of nausea, vomiting.  Patient also having worsening abdominal distention.  Patient reports that he has not had a bowel movement in the last 5 days.  On ED arrival, patient hypoxic, hypotensive in 70s systolic.  Patient normally wears 2 L O2 nasal cannula at baseline.              Patient History   Past Medical History:   Diagnosis Date    Age-related nuclear cataract, left eye 01/08/2019    Age-related nuclear cataract, left    Age-related nuclear cataract, right eye 01/08/2019    Age-related nuclear cataract, right    Other specified disorders of nose and nasal sinuses 12/06/2019    Nasal vestibulitis    Spondylosis without myelopathy or radiculopathy, site unspecified 10/12/2017    Degenerative spinal arthritis     Past Surgical History:   Procedure Laterality Date    OTHER SURGICAL HISTORY  12/06/2019    Hand surgery    OTHER SURGICAL HISTORY  07/26/2019    Cataract surgery    OTHER SURGICAL HISTORY  07/26/2019    Cholecystectomy laparoscopic     No family history on file.  Social History     Tobacco Use    Smoking status: Never    Smokeless tobacco: Never   Substance Use Topics    Alcohol use: Not on file    Drug use: Not on file       Physical Exam   ED Triage Vitals   Temperature Heart Rate Respirations BP   11/17/24 2122 11/17/24 2120 11/17/24 2125 11/17/24 2125   37.2 °C (99 °F) (!) 105 (!) 30 77/50      Pulse Ox Temp Source Heart Rate Source Patient Position    11/17/24 2120 11/17/24 2125 -- --   (!) 75 % Temporal        BP Location FiO2 (%)     -- --             Physical Exam  Constitutional:       Appearance: Normal appearance. He is ill-appearing.   HENT:      Head: Normocephalic and atraumatic.      Nose: Nose normal.      Mouth/Throat:      Mouth: Mucous membranes are moist.      Pharynx: Oropharynx is clear.   Eyes:      Extraocular Movements: Extraocular movements intact.      Conjunctiva/sclera: Conjunctivae normal.      Pupils: Pupils are equal, round, and reactive to light.   Cardiovascular:      Rate and Rhythm: Regular rhythm.      Pulses: Normal pulses.      Heart sounds: Normal heart sounds.   Pulmonary:      Effort: Pulmonary effort is normal.      Breath sounds: Normal breath sounds.   Abdominal:      General: Abdomen is flat. Bowel sounds are normal. There is distension.      Palpations: Abdomen is soft.      Tenderness: There is abdominal tenderness.   Musculoskeletal:         General: Normal range of motion.      Cervical back: Normal range of motion and neck supple.   Skin:     General: Skin is warm and dry.      Capillary Refill: Capillary refill takes less than 2 seconds.   Neurological:      General: No focal deficit present.      Mental Status: He is alert and oriented to person, place, and time. Mental status is at baseline.   Psychiatric:         Mood and Affect: Mood normal.         Behavior: Behavior normal.           ED Course & MDM   ED Course as of 11/18/24 0553   Sun Nov 17, 2024 2227 Pulse Ox(!): 76 %  Poor wave form [FN]   2237 EGFR(!): 17  CT delayed due to low gfr. Called rad ops for approval [FN]   2238 Discussed with Radiologist Dr. Pollard   Given concern for PE - ok to proceed due to life threat   Can time both Cts with one contrast bolus [FN]   2251 Shortly after arrival, patient bolused normal saline fluid [MI]   Mon Nov 18, 2024   0000 Reperfusion exam: Patient mentating appropriately.  Cap refill 2 to 3 seconds. [MI]      ED  Course User Index  [FN] Ting Shelton MD  [MI] Angi Macedo MD         Diagnoses as of 11/18/24 0553   Septic shock (Multi)                 No data recorded                                 Medical Decision Making  Patient is a 64 y.o. male who presents to [facility] ED for Hypotension. On initial ED evaluation, patient found to be in moderate distress. Per HPI, concern to evaluate and treat for respiratory failure, sepsis from possible aspiration pneumonia.  Obtaining cardiac labs and diagnostics, infectious labs and diagnostics.  Patient requiring 4 to 6 L O2, on arrival to saturate in the high 80s.  Patient shortly transitioned to high flow nasal cannula.  Patient EKG showed no acute ischemic change. CMP did show acute renal failure with creatinine 3.6, with patient baseline 1.0-1.3.  Bolused with sepsis bolus fluids.  CBC showed no significant leukocytosis or anemia.  Given significant patient blood loss, tachycardia, hypotension, patient was transfused 1 unit PRBCs.  Patient continued to be IV fluid resuscitated.  Patient VBG was obtained and showed acidosis 7.3, pCO2 31, lactate of 6.0.  Patient UA also showed signs of infection.  CT imaging also obtained to evaluate for possible PE, intra-abdominal process.  CT imaging revealed no acute intra-abdominal process, negative for PE, however did show bibasilar atelectasis in the lower lobes of the lung.  Patient covered with empiric cefepime, vancomycin.  Subsequent VBG, showed worsening hypercapnia and acidosis.  Patient transitioned to high flow nasal cannula, then to BiPAP.  Patient also administered DuoNeb breathing treatments, Solu-Medrol for respiratory effort, decreased air entry.  Patient blood pressures were initially responsive to IV fluids, however despite sepsis bolus, patient required the start of peripheral Levophed IV pressors.  Patient admitted to ICU for further evaluation and management of acute hypoxic, hypercapnic respiratory failure secondary to  pneumonia, as well as septic shock secondary to pneumonia.        Procedure  Procedures     Angi Macedo MD  Resident  11/18/24 1652       Angi Macedo MD  Resident  11/18/24 2981

## 2024-11-18 NOTE — NURSING NOTE
Per Palliative CNS, referral placed to hospice of the [facility] for withdrawal of care. Pt is DNRCC, hospice order in the chart.     Hospice meeting scheduled for 130P

## 2024-11-18 NOTE — NURSING NOTE
Pt flipped to GIP with [facility]. Comfort medications in place. DNRCC on chart. Palliative and [facility] team to continue to follow

## 2024-11-18 NOTE — DISCHARGE SUMMARY
Discharge Diagnosis  Septic shock (Multi)    Issues Requiring Follow-Up  none    Discharge Meds     Medication List      ASK your doctor about these medications     acetaminophen 325 mg tablet; Commonly known as: Tylenol   amLODIPine 5 mg tablet; Commonly known as: Norvasc   ascorbic acid 500 mg tablet; Commonly known as: Vitamin C   aspirin 81 mg EC tablet   atorvastatin 40 mg tablet; Commonly known as: Lipitor   baclofen 10 mg tablet; Commonly known as: Lioresal   bisacodyl 10 mg suppository; Commonly known as: Dulcolax   brimonidine 0.15 % ophthalmic solution; Commonly known as: AlphaGAN P   Deep Sea Nasal 0.65 % nasal spray; Generic drug: sodium chloride   docusate sodium 100 mg capsule; Commonly known as: Colace   dorzolamide 2 % ophthalmic solution; Commonly known as: Trusopt   DULoxetine 60 mg DR capsule; Commonly known as: Cymbalta   fluocinolone 0.01 % external solution; Commonly known as: Synalar   fluticasone 50 mcg/actuation nasal spray; Commonly known as: Flonase   gabapentin 800 mg tablet; Commonly known as: Neurontin   hydrALAZINE 25 mg tablet; Commonly known as: Apresoline   latanoprost 0.005 % ophthalmic solution; Commonly known as: Xalatan   magnesium hydroxide 400 mg/5 mL suspension; Commonly known as: Milk of   Magnesia   mirtazapine 7.5 mg tablet; Commonly known as: Remeron   multivitamin tablet   nitrofurantoin 50 mg capsule; Commonly known as: Macrodantin   pantoprazole 40 mg EC tablet; Commonly known as: ProtoNix   polyethylene glycol 17 gram packet; Commonly known as: Glycolax, Miralax   sodium phosphates 19-7 gram/118 mL enema enema; Commonly known as:   Triny       Test Results Pending At Discharge  Pending Labs       Order Current Status    Blood Gas Arterial Full Panel In process    Extra Urine Gray Tube In process    Legionella Antigen, Urine In process    Streptococcus pneumoniae Antigen, Urine In process    Urinalysis with Reflex Culture and Microscopic In process    Urine Culture In  process    Blood Culture Preliminary result    Blood Culture Preliminary result            Hospital Course   Robel Eastman is a 64 y.o. male with PMH of paraplegia, MDD, HLD, GERD, HTN, asthma, glaucoma and urinary retention with smith who presented from SNF for traumatic smith, nausea, and vomiting. Per EMS report, SNF RN reported issue with smith so they replaced it and then had bleeding and abdominal pain, and vomited. Currently, he is lethargic and tachypniec. He is able to answer some one word questions. He was able to state his name, that he was in the hospital and that it is 2024. He is unable to answer if he is okay with intubation or mechanical ventilation if needed. His emergency contact, Ludy was contacted and consented for intubation and central line placement.  The patient was intubated and central line was placed patient continued to decompensate family came to the ICU conversation was had with hospice and indicated the patient would most likely like to be comfort care and extubated.  Decision was made by family to transition patient to comfort care.    Pertinent Physical Exam At Time of Discharge  none    Outpatient Follow-Up  No future appointments.      Dru Cline MD

## 2024-11-18 NOTE — CONSULTS
Infectious Disease Consult Note      Patient Robel Eastman PCP Denia Latif MD    MRN [id number]  Admission Date 11/17/2024      Chief Complaint/Reason for Admission:  Robel is a 64 y.o. male who presented today with traumatic smith and was found to be in septic shock.    Subjective    Subjective   History of Presenting Illness  Mr. Robel Eastman is a 64 y.o. male with PMH of paraplegia, MDD, HLD, GERD, HTN, asthma, glaucoma and urinary retention with smith who presented from SNF for traumatic smith, nausea, and vomiting. Per EMS report, SNF RN reported issue with smith so they replaced it and then had bleeding and abdominal pain, and vomited. Staff reported that he has not had a BM in several days. He was hypoxic, hypotensive upon arrival. He has a 2L NC at baseline.    Past Medical History  Active Ambulatory Problems     Diagnosis Date Noted   • Neurogenic bladder 02/01/2024     Resolved Ambulatory Problems     Diagnosis Date Noted   • No Resolved Ambulatory Problems     Past Medical History:   Diagnosis Date   • Age-related nuclear cataract, left eye 01/08/2019   • Age-related nuclear cataract, right eye 01/08/2019   • Other specified disorders of nose and nasal sinuses 12/06/2019   • Spondylosis without myelopathy or radiculopathy, site unspecified 10/12/2017       Past Surgical History   Past Surgical History:   Procedure Laterality Date   • OTHER SURGICAL HISTORY  12/06/2019    Hand surgery   • OTHER SURGICAL HISTORY  07/26/2019    Cataract surgery   • OTHER SURGICAL HISTORY  07/26/2019    Cholecystectomy laparoscopic       Social History  Social History     Socioeconomic History   • Marital status: Single     Spouse name: Not on file   • Number of children: Not on file   • Years of education: Not on file   • Highest education level: Not on file   Occupational History   • Not on file   Tobacco Use   • Smoking status: Never   • Smokeless tobacco: Never   Substance and Sexual Activity   • Alcohol use: Not  on file   • Drug use: Not on file   • Sexual activity: Not on file   Other Topics Concern   • Not on file   Social History Narrative   • Not on file     Social Drivers of Health     Financial Resource Strain: Patient Declined (3/13/2024)    Received from [facility]    Overall Financial Resource Strain (CARDIA)    • Difficulty of Paying Living Expenses: Patient declined   Food Insecurity: No Food Insecurity (3/31/2024)    Received from [facility]    Hunger Vital Sign    • Worried About Running Out of Food in the Last Year: Never true    • Ran Out of Food in the Last Year: Never true   Transportation Needs: No Transportation Needs (3/31/2024)    Received from [facility]    PRAPARE - Transportation    • Lack of Transportation (Medical): No    • Lack of Transportation (Non-Medical): No   Physical Activity: Not on file   Stress: Not on file   Social Connections: Not on file   Intimate Partner Violence: Not on file   Housing Stability: Not on file       Home Medications  Prior to Admission medications    Medication Sig Start Date End Date Taking? Authorizing Provider   amLODIPine (Norvasc) 5 mg tablet Take 1 tablet (5 mg) by mouth once daily.   Yes Historical Provider, MD   ascorbic acid (Vitamin C) 500 mg tablet Take 1 tablet (500 mg) by mouth once daily.   Yes Historical Provider, MD   aspirin 81 mg EC tablet Take 1 tablet (81 mg) by mouth once daily.   Yes Historical Provider, MD   atorvastatin (Lipitor) 40 mg tablet Take 1 tablet (40 mg) by mouth once daily at bedtime.   Yes Historical Provider, MD   baclofen (Lioresal) 10 mg tablet Take 1 tablet (10 mg) by mouth once daily at bedtime.   Yes Historical Provider, MD   brimonidine (AlphaGAN P) 0.15 % ophthalmic solution Administer 1 drop into both eyes 2 times a day.   Yes Historical Provider, MD   docusate sodium (Colace) 100 mg capsule Take 1 capsule (100 mg) by mouth once daily at bedtime.   Yes Historical Provider, MD   dorzolamide (Trusopt) 2 %  ophthalmic solution Administer 1 drop into both eyes 2 times a day.   Yes Historical Provider, MD   DULoxetine (Cymbalta) 60 mg DR capsule Take 1 capsule (60 mg) by mouth once daily.   Yes Historical Provider, MD   fluticasone (Flonase) 50 mcg/actuation nasal spray Administer 1 spray into each nostril once daily.   Yes Historical Provider, MD   gabapentin (Neurontin) 800 mg tablet Take 1 tablet (800 mg) by mouth 2 times a day.   Yes Historical Provider, MD   hydrALAZINE (Apresoline) 25 mg tablet Take 1 tablet (25 mg) by mouth 2 times a day.   Yes Historical Provider, MD   latanoprost (Xalatan) 0.005 % ophthalmic solution Administer 1 drop into both eyes once daily at bedtime.   Yes Historical Provider, MD   mirtazapine (Remeron) 7.5 mg tablet Take 1 tablet (7.5 mg) by mouth once daily at bedtime.   Yes Historical Provider, MD   multivitamin tablet Take 1 tablet by mouth once daily.   Yes Historical Provider, MD   nitrofurantoin (Macrodantin) 50 mg capsule Take 1 capsule (50 mg) by mouth once daily.   Yes Historical Provider, MD   pantoprazole (ProtoNix) 40 mg EC tablet Take 1 tablet (40 mg) by mouth once daily in the morning. Take before meals.   Yes Historical Provider, MD   polyethylene glycol (Glycolax, Miralax) 17 gram packet Take 17 g by mouth once daily.   Yes Historical Provider, MD   acetaminophen (Tylenol) 325 mg tablet Take 2 tablets (650 mg) by mouth every 6 hours if needed for mild pain (1 - 3).    Historical Provider, MD   bisacodyl (Dulcolax) 10 mg suppository Insert 1 suppository (10 mg) into the rectum once daily as needed for constipation.    Historical Provider, MD   fluocinolone (Synalar) 0.01 % external solution Apply 1 Application topically every 12 hours if needed (dry scalp).    Historical Provider, MD   magnesium hydroxide (Milk of Magnesia) 400 mg/5 mL suspension Take 30 mL by mouth once daily as needed for constipation.    Historical Provider, MD   sodium chloride (Deep Sea Nasal) 0.65 %  "nasal spray Administer 1 spray into each nostril every 6 hours if needed for congestion.    Historical Provider, MD   sodium phosphates (Fleets) 19-7 gram/118 mL enema enema Insert 133 mL (1 enema) into the rectum 1 time if needed for constipation.    Historical Provider, MD        Family History  No family history on file.    Allergies:  Allergies   Allergen Reactions   • Penicillins Itching and Rash        Review of System      Objective    Objective   Vital Signs:  Visit Vitals  BP 93/60   Pulse 98   Temp 36.6 °C (97.9 °F) (Temporal)   Resp 18   Ht 1.676 m (5' 6\")   Wt 98.4 kg (216 lb 14.9 oz)   SpO2 94%   BMI 35.01 kg/m²   Smoking Status Never   BSA 2.14 m²        Physical Examination:  Constitutional: intubated  Head and Face: Atraumatic, normocephalic   Eyes: reactive to light  ENT: intubated  Cardiovascular: RRR, no murmurs, rubs, or gallops, radial pulses +2  Pulmonary: CTAB, no respiratory distress, no wheezing, rales or rhonchi, on RA  Abdomen: soft, non-tender, nondistended, no guarding rigidity or rebound tenderness, no masses noted  MSK: Negative for edema, No joint swelling  Neuro: intubated  Skin: No lesions, contusions, or erythema.  Psychiatric: n/a, intubated    Laboratory Data:    Results from last 7 days   Lab Units 11/18/24  0531 11/17/24  2136   WBC AUTO x10*3/uL 10.2 8.0   RBC AUTO x10*6/uL 5.09 5.26   HEMOGLOBIN g/dL 14.3 14.9     Results from last 7 days   Lab Units 11/18/24  1052 11/18/24  0531 11/17/24  2301 11/17/24  2136   SODIUM mmol/L 127* 131* 133* 135*   POTASSIUM mmol/L 3.8 4.1 3.8 3.5   CHLORIDE mmol/L 96* 99 101 97*   CO2 mmol/L 19* 19* 18* 22   BUN mg/dL 40* 37* 28* 26*   CREATININE mg/dL 3.73* 3.97* 3.67* 3.76*   CALCIUM mg/dL 8.0* 7.8* 8.4* 9.1   PHOSPHORUS mg/dL  --  4.8 3.0  --    MAGNESIUM mg/dL  --  1.42*  --   --    BILIRUBIN TOTAL mg/dL 3.4* 3.3*  --  1.7*   ALT U/L 300* 324*  --  383*   AST U/L 431* 453*  --  488*       Imaging:  Transthoracic Echo (TTE) " Complete    Result Date: 11/18/2024            [facility] [place] [address], [facility] [address]    Tel [phone] Fax [phone] TRANSTHORACIC ECHOCARDIOGRAM REPORT Patient Name:       CARMELO NEVES       Reading Physician:    [id number] Carmelo Harrison MD Study Date:         11/18/2024           Ordering Provider:    [id number] KIARA COREAS MRN/PID:            [id number]             Fellow: Accession#:         [id number]         Nurse:                Nya Esquivel RN Date of Birth/Age:  1960 / 64 years Sonographer:          ADM Gender Assigned at  M                    Additional Staff: Birth: Height:             167.64 cm            Admit Date: Weight:             97.98 kg             Admission Status:     Inpatient -                                                                Routine BSA / BMI:          2.07 m2 / 34.86      Department Location:  [facility] ICU Back                     kg/m2                                      (27-34) Blood Pressure: 93 /60 mmHg Study Type:    TRANSTHORACIC ECHO (TTE) COMPLETE Diagnosis/ICD: Severe sepsis with septic shock-R65.21 Indication:    septic shock CPT Codes:     Echo Complete w Full Doppler-[id number]  Study Detail: The following Echo studies were performed: 2D, M-Mode, Doppler and               color flow. Technically challenging study due to patient lying in               supine position, body habitus and poor acoustic windows. The               patient is intubated. Definity used as a contrast agent for               endocardial border definition. Total contrast used for this               procedure was 3 mL via IV push. The patient was sedated.  PHYSICIAN INTERPRETATION: Left Ventricle: Left ventricular ejection fraction is moderately decreased, by visual estimate at 35%.  There is global hypokinesis of the left ventricle with minor regional variations. The left ventricular cavity size is normal. There is mild increased septal and normal posterior left ventricular wall thickness. Spectral Doppler shows a Grade I (impaired relaxation pattern) of left ventricular diastolic filling with normal left atrial filling pressure. Left Atrium: The left atrium is normal in size. Right Ventricle: The right ventricle is normal in size. There is normal right ventricular global systolic function. Right Atrium: The right atrium is normal in size. Aortic Valve: The aortic valve is trileaflet. There is no evidence of aortic valve regurgitation. The peak instantaneous gradient of the aortic valve is 5 mmHg. Mitral Valve: The mitral valve is normal in structure. There is no evidence of mitral valve regurgitation. Tricuspid Valve: The tricuspid valve is structurally normal. There is mild tricuspid regurgitation. The Doppler estimated RVSP is within normal limits at 28.6 mmHg. Pulmonic Valve: The pulmonic valve is structurally normal. There is no indication of pulmonic valve regurgitation. Pericardium: No pericardial effusion noted. Aorta: The aortic root is normal.  CONCLUSIONS:  1. Left ventricular ejection fraction is moderately decreased, by visual estimate at 35%.  2. There is global hypokinesis of the left ventricle with minor regional variations.  3. There is normal right ventricular global systolic function.  4. Right ventricular within normal limits. QUANTITATIVE DATA SUMMARY:  2D MEASUREMENTS:            Normal Ranges: LAs:             3.63 cm    (2.7-4.0cm) IVSd:            1.04 cm    (0.6-1.1cm) LVPWd:           0.98 cm    (0.6-1.1cm) LVIDd:           5.43 cm    (3.9-5.9cm) LVIDs:           4.30 cm LV Mass Index:   102.4 g/m2 LV % FS          20.9 %  LA VOLUME:                   Normal Ranges: LA Vol A4C:       28.4 ml    (22+/-6mL/m2) LA Vol A2C:       45.1 ml LA Vol BP:        38.5 ml LA Vol  Index A4C: 13.7 ml/m2 LA Vol Index A2C: 21.8 ml/m2 LA Vol Index BP:  18.6 ml/m2 LA Vol A4C:       27.2 ml LA Vol A2C:       43.0 ml LA Vol Index BSA: 17.0 ml/m2  M-MODE MEASUREMENTS:         Normal Ranges: AoV Exc:             2.02 cm (1.5-2.5cm)  AORTA MEASUREMENTS:         Normal Ranges: AoV Exc:            2.02 cm (1.5-2.5cm)  LV SYSTOLIC FUNCTION BY 2D PLANIMETRY (MOD):                      Normal Ranges: EF-A4C View:    17 % (>=55%) EF-A2C View:    34 % EF-Biplane:     20 % EF-Visual:      35 % LV EF Reported: 35 %  LV DIASTOLIC FUNCTION:          Normal Ranges: MV Peak E:             0.68 m/s (0.7-1.2 m/s) MV Peak A:             0.86 m/s (0.42-0.7 m/s) E/A Ratio:             0.79     (1.0-2.2)  MITRAL VALVE:          Normal Ranges: MV DT:        138 msec (150-240msec)  AORTIC VALVE:           Normal Ranges: AoV Vmax:      1.08 m/s (<=1.7m/s) AoV Peak P.7 mmHg (<20mmHg) LVOT Max Allan:  0.97 m/s (<=1.1m/s) LVOT Diameter: 2.02 cm  (1.8-2.4cm) AoV Area,Vmax: 2.88 cm2 (2.5-4.5cm2)  RIGHT VENTRICLE: RV Major 7.6 cm  TRICUSPID VALVE/RVSP:          Normal Ranges: Peak TR Velocity:     2.53 m/s RV Syst Pressure:     29 mmHg  (< 30mmHg) IVC Diam:             1.94 cm  AORTA: Asc Ao Diam 3.48 cm  [id number] Robel Harrison MD Electronically signed on 2024 at 9:51:24 AM  ** Final **     ECG 12 lead    Result Date: 2024  Sinus tachycardia Nonspecific T wave abnormality Abnormal ECG When compared with ECG of 2024 21:37, (unconfirmed) Sinus rhythm has replaced Junctional rhythm Nonspecific T wave abnormality now evident in Inferior leads    ECG 12 lead    Result Date: 2024  Accelerated Junctional rhythm Nonspecific ST and T wave abnormality Abnormal ECG When compared with ECG of 21-MAY-2023 08:36, Junctional rhythm has replaced Sinus rhythm Vent. rate has increased BY  37 BPM Nonspecific T wave abnormality now evident in Lateral leads QT has lengthened    XR chest 1 view    Result Date:  11/18/2024  Interpreted By:  Lionel Sanders, STUDY: XR CHEST 1 VIEW;  11/18/2024 6:07 am   INDICATION: Signs/Symptoms:RIJ CVC.   COMPARISON: None.   ACCESSION NUMBER(S): [id number]   ORDERING CLINICIAN: KIARA COREAS   FINDINGS:   CARDIOMEDIASTINAL SILHOUETTE: Cardiomediastinal silhouette is normal in size and configuration.   LUNGS/PLEURA: Mild bibasilar subsegmental atelectasis. There are no consolidations.There are no pleural effusions. There is no demonstrated pneumothorax.   LINES/TUBES: Interval placement of right internal jugular central venous catheter terminating in the superior vena cava. Endotracheal tube terminates 3.5 cm above the bryant. Enteric tube courses below the diaphragm outside of the field of view.   BONES: No evidence of acute osseous abnormality.       1.  Interval placement of right internal jugular central venous catheter terminating in superior vena cava. Endotracheal and enteric tube in place.     Signed by: Lionel Sanders 11/18/2024 6:23 AM Dictation workstation:   [id number]    XR chest 1 view    Result Date: 11/18/2024  Interpreted By:  Gregorio Saenz, STUDY: XR CHEST 1 VIEW; XR ABDOMEN 1 VIEW;  11/18/2024 2:48 am   INDICATION: Signs/Symptoms:ETT; Signs/Symptoms:OGT.   COMPARISON: 5/21/2023   ACCESSION NUMBER(S): [id number]; [id number]   ORDERING CLINICIAN: KIARA COREAS   FINDINGS: Endotracheal tube tip terminates approximately 4.7 cm superior to the bryant. Nasogastric tube tip terminates in the left upper abdomen at the level of the stomach. The cardiac silhouette is stable in size. There is asymmetric opacity in the right upper lung and also right basilar opacity. No significant pleural effusion. No pneumothorax. There is nonspecific bowel gas pattern.       Satisfactory position endotracheal tube and nasogastric tube.   Asymmetric opacity in the right upper lung and also right basilar opacity concerning for infiltrate.   MACRO: None   Signed by: Gregorio Saenz 11/18/2024 2:52  AM Dictation workstation:   [id number]    XR abdomen 1 view    Result Date: 11/18/2024  Interpreted By:  Gregorio Saenz, STUDY: XR CHEST 1 VIEW; XR ABDOMEN 1 VIEW;  11/18/2024 2:48 am   INDICATION: Signs/Symptoms:ETT; Signs/Symptoms:OGT.   COMPARISON: 5/21/2023   ACCESSION NUMBER(S): [id number]; [id number]   ORDERING CLINICIAN: KIARA COREAS   FINDINGS: Endotracheal tube tip terminates approximately 4.7 cm superior to the bryant. Nasogastric tube tip terminates in the left upper abdomen at the level of the stomach. The cardiac silhouette is stable in size. There is asymmetric opacity in the right upper lung and also right basilar opacity. No significant pleural effusion. No pneumothorax. There is nonspecific bowel gas pattern.       Satisfactory position endotracheal tube and nasogastric tube.   Asymmetric opacity in the right upper lung and also right basilar opacity concerning for infiltrate.   MACRO: None   Signed by: Gregorio Saenz 11/18/2024 2:52 AM Dictation workstation:   [id number]    CT angio chest abdomen pelvis    Result Date: 11/18/2024  Interpreted By:  Gregorio Saenz, STUDY: CT ANGIO CHEST ABDOMEN PELVIS;  11/17/2024 11:42 pm   INDICATION: Signs/Symptoms:Hypotensive, hypoxic, traumatic Ayon, blood loss.   COMPARISON: 10/3/2023   ACCESSION NUMBER(S): [id number]   ORDERING CLINICIAN: PAZ POTTER   TECHNIQUE: Contiguous axial images of the chest, abdomen and pelvis were obtained with and without intravenous contrast. Coronal and sagittal reformatted images were obtained from the axial images. MIPS and 3D reformatted images were also performed and reviewed.   FINDINGS: CT CHEST:   The examination is limited secondary to body habitus, motion, and beam hardening artifact secondary to inferior position of the patient's arms.   No axillary, mediastinal, hilar lymphadenopathy.   The heart is normal in size. No significant pericardial effusion. No evidence of acute central, main, or lobar pulmonary  embolism. Evaluation for more distal emboli is limited secondary to patient respiratory motion and lung parenchymal opacities. No evidence of aortic aneurysm or dissection. The ascending aorta measures 3.1 cm in diameter.   Evaluation of the lungs is limited secondary to respiratory motion. There are atelectatic/consolidative opacities in the bilateral lower lobes. No significant pleural effusion. No pneumothorax.   Multilevel degenerative change of the thoracic spine.   CT ABDOMEN PELVIS:   There is hepatic steatosis. Postsurgical change of cholecystectomy.   The pancreas, spleen, and adrenal glands appear unremarkable.   Symmetric enhancement of the kidneys. A subcentimeter hypodensity in the lower pole the right kidney is too small to characterize. No hydronephrosis.   No evidence of abdominal aortic aneurysm or dissection.   No evidence of bowel obstruction the appendix is surgically absent. There is a moderate amount of stool in the rectum.   There is a Ayon catheter and the urinary bladder is underdistended and not well evaluated. There is however evidence of urinary bladder wall thickening and edema.   Band of density in the midline anterior pelvic wall set site of previously seen suprapubic bladder catheter.   Bilateral fat containing inguinal hernias.   Multilevel degenerative change of the lumbar spine.       No evidence of aortic aneurysm or dissection.   No evidence of acute pulmonary embolism.   Atelectatic/consolidative opacities in the bilateral lower lobes.   Hepatic steatosis and postsurgical change of cholecystectomy.   A Ayon catheter is present and the urinary bladder is underdistended and not well evaluated. There is however urinary bladder wall thickening and edema compatible cystitis and clinical correlation with urinalysis is recommended.   Band of density in the midline anterior pelvic wall at site of previously seen suprapubic urinary bladder catheter.   Moderate amount of stool in the  sigmoid colon and rectum.   MACRO: None   Signed by: Gregorio Saenz 11/18/2024 12:45 AM Dictation workstation:   [id number]      Medications:  Scheduled medications  azithromycin, 500 mg, intravenous, Daily  docusate sodium, 100 mg, orogastric tube, BID  etomidate, 15 mg, intravenous, Once  fludrocortisone, 0.1 mg, oral, Daily  heparin (porcine), 5,000 Units, subcutaneous, q8h  hydrocortisone sodium succinate, 50 mg, intravenous, q6h  insulin lispro, 0-5 Units, subcutaneous, q4h  ipratropium-albuteroL, 3 mL, nebulization, q6h  lactulose, 20 g, oral, TID  meropenem, 500 mg, intravenous, q12h  oxygen, , inhalation, Continuous - Inhalation  pantoprazole, 40 mg, oral, Daily before breakfast   Or  pantoprazole, 40 mg, intravenous, Daily before breakfast  polyethylene glycol, 17 g, orogastric tube, Daily  rocuronium, 40 mg, intravenous, Once      Continuous medications  dexmedeTOMIDine, 0-1.5 mcg/kg/hr, Last Rate: 0.2 mcg/kg/hr (11/18/24 0734)  fentaNYL, 0-300 mcg/hr, Last Rate: 125 mcg/hr (11/18/24 1006)  norepinephrine, 0-1 mcg/kg/min, Last Rate: 0.5 mcg/kg/min (11/18/24 1035)  sodium bicarbonate, 150 mL/hr, Last Rate: 150 mL/hr (11/18/24 1219)  vasopressin, 0.04 Units/min, Last Rate: 0.04 Units/min (11/18/24 0929)      PRN medications  PRN medications: dextrose, dextrose, fentaNYL, glucagon, glucagon, vancomycin    No results found for the last 90 days.        Assessment    Assessment & Plan   Mr. Robel Eastman is a 64 y.o. male who presents with septic shock.    #Shock, likely Cardiogenic  #Acute on Chronic Respiratory Failure  ::Pt required 4-6L O2 upon arrival and was transitioned to high flow, then to BiPap, intubated in ICU; respiratory failure 2/2 CHF rather than infection  ::Reported significant blood loss from smith, Hg 14.9; given 1 unit PRBCs in ED  ::CXR = Asymmetric opacity in the right upper lung and also right basilar opacity concerning for infiltrate.  ::CT = atelectasis/consolidative opacities in  bilateral lower lobes, urinary bladder wall thickening and edema compatible with cystitis  ::MRSA swab positive  ::UA red, turbid, +2 protein, +3 blood, 250 leukocyte esterase negative nitrates  ::Lactate 7.2->5.0   ::h/o cultures 3/2023 enterobacter cloacae, 10/2023 E Coli and MRSA  ::Echo 11/18 = EF 35%, prior echo 50-55%  ::VBG shows normal pO2, which is more suggestive of cardiogenic shock, not distributive shock  ::troponin 145->128  ::EKG Sinus tachycardia bpm 110, QTc 49, no signs of ischemia or ST elevations  -Blood culture pending  -Urine Culture pending  -Given cefepime, azithromycin, and Vanc in ED; cefepime changed to meropenem once admitted to ICU  -According to primary team, goals of care are changed to comfort care only measures, will sign off at this time      The rest per the primary team.    Thank you for the consult.      Patient seen and discussed with the attending.  Signature: Eun Torres,   Date: November 18, 2024  This note has been transcribed using Dragon voice recognition system and there is a possibility of unintentional typing misprints.  Any information found to be copied from previous providers is done in the best interest of the patient to provide accurate, quality, and continuity of care.        Attending Note:  I have examined the patient, gone over all the history, lab and Xray findings.  The rodas elements were confirmed.   The above assessment and recommendations were written after my personal involvement in obtaining the history and examining the patient and direct input in writing the above notes. Plan as outlined by resident's note.    Elyse Bazan MD

## 2024-11-18 NOTE — ED PROCEDURE NOTE
Procedure  Critical Care    Performed by: Ting Shelton MD  Authorized by: Ting Shelton MD    Critical care provider statement:     Critical care time (minutes):  63    Critical care time was exclusive of:  Separately billable procedures and treating other patients and teaching time    Critical care was necessary to treat or prevent imminent or life-threatening deterioration of the following conditions:  Respiratory failure, sepsis and renal failure    Critical care was time spent personally by me on the following activities:  Blood draw for specimens, ordering and review of laboratory studies, ordering and performing treatments and interventions, ordering and review of radiographic studies, pulse oximetry, re-evaluation of patient's condition, review of old charts, development of treatment plan with patient or surrogate, discussions with consultants, examination of patient and obtaining history from patient or surrogate    Care discussed with: admitting provider    Comments:      Patient signed out to me at 10 PM  Patient had gross blood on smith, RAYNE cf acute renal failure  Hypoxic and hypotensive - acute resp failure and sepsis   Elevated trop with cf demand ischemia  Patient was initially on high flow nasal cannula, then transition to BiPAP after concern for respiratory failure in the setting of hypoxia and tachypnea.  Patient continued to be tachypneic and have increased work of breathing on BiPAP.  Patient was admitted to the ICU after discussion with the ICU team               Ting Shelton MD  11/18/24 0143       Ting Shelton MD  11/18/24 0144

## 2024-11-18 NOTE — CONSULTS
"Consults    Reason For Consult  Reason for Consult: communication / medical decision making and patient/family support     History Of Present Illness  Robel Eastman is a 64 y.o. male with past medical history of  paraplegia, MDD, HLD, GERD, HTN, asthma, glaucoma and urinary retention with fole  is presenting with  traumatic smith, nausea, and vomiting.      Personal/Social History   He reports that he has never smoked. He has never used smokeless tobacco. No history on file for alcohol use and drug use.      Past Medical History  He has a past medical history of Age-related nuclear cataract, left eye (01/08/2019), Age-related nuclear cataract, right eye (01/08/2019), Other specified disorders of nose and nasal sinuses (12/06/2019), and Spondylosis without myelopathy or radiculopathy, site unspecified (10/12/2017).    Surgical History  He has a past surgical history that includes Other surgical history (12/06/2019); Other surgical history (07/26/2019); and Other surgical history (07/26/2019).     Family History  No family history on file.  Allergies  Penicillins    Review of Systems   Unable to perform ROS: Intubated        Physical Exam  Constitutional:       Appearance: He is obese. He is ill-appearing.   HENT:      Head: Normocephalic.      Right Ear: External ear normal.      Left Ear: External ear normal.      Nose: Nose normal.      Mouth/Throat:      Mouth: Mucous membranes are dry.   Cardiovascular:      Rate and Rhythm: Normal rate.      Pulses: Normal pulses.   Pulmonary:      Breath sounds: Rales present.   Abdominal:      Palpations: Abdomen is soft.   Musculoskeletal:      Cervical back: Neck supple.   Skin:     General: Skin is warm and dry.   Neurological:      Sensory: Sensory deficit present.      Comments: Sedated on the vent         Last Recorded Vitals  Blood pressure 93/60, pulse 98, temperature 36.6 °C (97.9 °F), temperature source Temporal, resp. rate 18, height 1.676 m (5' 6\"), weight 98.4 kg " (216 lb 14.9 oz), SpO2 94%.    Relevant Results  azithromycin, 500 mg, intravenous, Daily  docusate sodium, 100 mg, orogastric tube, BID  etomidate, 15 mg, intravenous, Once  fludrocortisone, 0.1 mg, oral, Daily  heparin (porcine), 5,000 Units, subcutaneous, q8h  hydrocortisone sodium succinate, 50 mg, intravenous, q6h  insulin lispro, 0-5 Units, subcutaneous, q4h  ipratropium-albuteroL, 3 mL, nebulization, q6h  lactulose, 20 g, oral, TID  magnesium sulfate, 2 g, intravenous, Once  meropenem, 500 mg, intravenous, q12h  oxygen, , inhalation, Continuous - Inhalation  pantoprazole, 40 mg, oral, Daily before breakfast   Or  pantoprazole, 40 mg, intravenous, Daily before breakfast  polyethylene glycol, 17 g, orogastric tube, Daily  rocuronium, 40 mg, intravenous, Once       Results for orders placed or performed during the hospital encounter of 11/17/24 (from the past 24 hours)   CBC and Auto Differential   Result Value Ref Range    WBC 8.0 4.4 - 11.3 x10*3/uL    nRBC 0.0 0.0 - 0.0 /100 WBCs    RBC 5.26 4.50 - 5.90 x10*6/uL    Hemoglobin 14.9 13.5 - 17.5 g/dL    Hematocrit 45.4 41.0 - 52.0 %    MCV 86 80 - 100 fL    MCH 28.3 26.0 - 34.0 pg    MCHC 32.8 32.0 - 36.0 g/dL    RDW 15.3 (H) 11.5 - 14.5 %    Platelets 143 (L) 150 - 450 x10*3/uL    Immature Granulocytes %, Automated 0.6 0.0 - 0.9 %    Immature Granulocytes Absolute, Automated 0.05 0.00 - 0.70 x10*3/uL   Comprehensive Metabolic Panel   Result Value Ref Range    Glucose 90 74 - 99 mg/dL    Sodium 135 (L) 136 - 145 mmol/L    Potassium 3.5 3.5 - 5.3 mmol/L    Chloride 97 (L) 98 - 107 mmol/L    Bicarbonate 22 21 - 32 mmol/L    Anion Gap 20 10 - 20 mmol/L    Urea Nitrogen 26 (H) 6 - 23 mg/dL    Creatinine 3.76 (H) 0.50 - 1.30 mg/dL    eGFR 17 (L) >60 mL/min/1.73m*2    Calcium 9.1 8.6 - 10.3 mg/dL    Albumin 3.9 3.4 - 5.0 g/dL    Alkaline Phosphatase 129 33 - 136 U/L    Total Protein 7.1 6.4 - 8.2 g/dL     (H) 9 - 39 U/L    Bilirubin, Total 1.7 (H) 0.0 - 1.2  mg/dL     (H) 10 - 52 U/L   Troponin I, High Sensitivity   Result Value Ref Range    Troponin I, High Sensitivity 145 (HH) 0 - 20 ng/L   Manual Differential   Result Value Ref Range    Neutrophils %, Manual 51.0 40.0 - 80.0 %    Bands %, Manual 34.0 0.0 - 5.0 %    Lymphocytes %, Manual 4.0 13.0 - 44.0 %    Monocytes %, Manual 3.0 2.0 - 10.0 %    Eosinophils %, Manual 0.0 0.0 - 6.0 %    Basophils %, Manual 0.0 0.0 - 2.0 %    Metamyelocytes %, Manual 6.0 0.0 - 0.0 %    Myelocytes %, Manual 2.0 0.0 - 0.0 %    Seg Neutrophils Absolute, Manual 4.08 1.20 - 7.00 x10*3/uL    Bands Absolute, Manual 2.72 (H) 0.00 - 0.70 x10*3/uL    Lymphocytes Absolute, Manual 0.32 (L) 1.20 - 4.80 x10*3/uL    Monocytes Absolute, Manual 0.24 0.10 - 1.00 x10*3/uL    Eosinophils Absolute, Manual 0.00 0.00 - 0.70 x10*3/uL    Basophils Absolute, Manual 0.00 0.00 - 0.10 x10*3/uL    Metamyelocytes Absolute, Manual 0.48 0.00 - 0.00 x10*3/uL    Myelocytes Absolute, Manual 0.16 0.00 - 0.00 x10*3/uL    Total Cells Counted 100     Neutrophils Absolute, Manual 6.80 1.20 - 7.70 x10*3/uL    RBC Morphology See Below     Luis Cells Few    POCT GLUCOSE   Result Value Ref Range    POCT Glucose 77 74 - 99 mg/dL   ECG 12 lead   Result Value Ref Range    Ventricular Rate 104 BPM    Atrial Rate 87 BPM    QRS Duration 78 ms    QT Interval 504 ms    QTC Calculation(Bazett) 662 ms    R Axis 25 degrees    T Axis 68 degrees    QRS Count 17 beats    Q Onset 224 ms    T Offset 476 ms    QTC Fredericia 605 ms   Blood Gas Arterial Full Panel Unsolicited   Result Value Ref Range    POCT pH, Arterial 7.30 (L) 7.38 - 7.42 pH    POCT pCO2, Arterial 31 (L) 38 - 42 mm Hg    POCT pO2, Arterial 55 (L) 85 - 95 mm Hg    POCT SO2, Arterial 87 (L) 94 - 100 %    POCT Oxy Hemoglobin, Arterial 85.1 (L) 94.0 - 98.0 %    POCT Hematocrit Calculated, Arterial 41.0 41.0 - 52.0 %    POCT Sodium, Arterial 130 (L) 136 - 145 mmol/L    POCT Potassium, Arterial 3.6 3.5 - 5.3 mmol/L    POCT  Chloride, Arterial 99 98 - 107 mmol/L    POCT Ionized Calcium, Arterial 1.19 1.10 - 1.33 mmol/L    POCT Glucose, Arterial 92 74 - 99 mg/dL    POCT Lactate, Arterial 6.0 (HH) 0.4 - 2.0 mmol/L    POCT Base Excess, Arterial -9.9 (L) -2.0 - 3.0 mmol/L    POCT HCO3 Calculated, Arterial 15.3 (L) 22.0 - 26.0 mmol/L    POCT Hemoglobin, Arterial 13.7 13.5 - 17.5 g/dL    POCT Anion Gap, Arterial 19 10 - 25 mmo/L    Patient Temperature      Critical Called By NK     Critical Called To [facility]     Critical Call Time 2146     Critical Read Back Y     Site of Arterial Puncture LR     Nelson's Test YES    Lactate   Result Value Ref Range    Lactate 6.7 (HH) 0.4 - 2.0 mmol/L   Blood Culture    Specimen: Peripheral Venipuncture; Blood culture   Result Value Ref Range    Blood Culture Loaded on Instrument - Culture in progress    Urinalysis with Reflex Culture and Microscopic   Result Value Ref Range    Color, Urine Red (N) Straw, Yellow    Appearance, Urine Turbid (N) Clear    Specific Gravity, Urine 1.025 1.005 - 1.035    pH, Urine 7.0 5.0, 5.5, 6.0, 6.5, 7.0, 7.5, 8.0    Protein, Urine 100 (2+) (A) NEGATIVE, 10 (TRACE) mg/dL    Glucose, Urine NEGATIVE NEGATIVE mg/dL    Blood, Urine 1.0 (3+) (A) NEGATIVE    Ketones, Urine NEGATIVE NEGATIVE mg/dL    Bilirubin, Urine NEGATIVE NEGATIVE    Urobilinogen, Urine NORM NORM mg/dL    Nitrite, Urine NEGATIVE NEGATIVE    Leukocyte Esterase, Urine 250 Talat/µL (A) NEGATIVE   Extra Urine Gray Tube   Result Value Ref Range    Extra Tube Hold for add-ons.    Microscopic Only, Urine   Result Value Ref Range    WBC, Urine 1-5 1-5, NONE /HPF    RBC, Urine >20 (A) NONE, 1-2, 3-5 /HPF    Squamous Epithelial Cells, Urine 1-9 (SPARSE) Reference range not established. /HPF   Type and screen   Result Value Ref Range    ANTIBODY SCREEN NEG    Abo/Rh   Result Value Ref Range    ABO TYPE O     Rh TYPE POS    Blood Culture    Specimen: Peripheral Venipuncture; Blood culture   Result Value Ref Range    Blood Culture  Loaded on Instrument - Culture in progress    Lactate   Result Value Ref Range    Lactate 6.4 (HH) 0.4 - 2.0 mmol/L   Renal function panel   Result Value Ref Range    Glucose 84 74 - 99 mg/dL    Sodium 133 (L) 136 - 145 mmol/L    Potassium 3.8 3.5 - 5.3 mmol/L    Chloride 101 98 - 107 mmol/L    Bicarbonate 18 (L) 21 - 32 mmol/L    Anion Gap 18 10 - 20 mmol/L    Urea Nitrogen 28 (H) 6 - 23 mg/dL    Creatinine 3.67 (H) 0.50 - 1.30 mg/dL    eGFR 18 (L) >60 mL/min/1.73m*2    Calcium 8.4 (L) 8.6 - 10.3 mg/dL    Phosphorus 3.0 2.5 - 4.9 mg/dL    Albumin 3.3 (L) 3.4 - 5.0 g/dL   Troponin I, High Sensitivity   Result Value Ref Range    Troponin I, High Sensitivity 128 (HH) 0 - 20 ng/L   Protime-INR   Result Value Ref Range    Protime 18.7 (H) 9.8 - 12.8 seconds    INR 1.7 (H) 0.9 - 1.1   VERIFY ABO/Rh Group Test   Result Value Ref Range    ABO TYPE O     Rh TYPE POS    Acute Toxicology Panel, Blood   Result Value Ref Range    Acetaminophen <10.0 10.0 - 30.0 ug/mL    Salicylate  <3 4 - 20 mg/dL    Alcohol <10 <=10 mg/dL   ECG 12 lead   Result Value Ref Range    Ventricular Rate 110 BPM    Atrial Rate 110 BPM    OR Interval 168 ms    QRS Duration 78 ms    QT Interval 362 ms    QTC Calculation(Bazett) 489 ms    P Axis 21 degrees    R Axis 11 degrees    T Axis 53 degrees    QRS Count 18 beats    Q Onset 225 ms    P Onset 141 ms    P Offset 186 ms    T Offset 406 ms    QTC Fredericia 443 ms   Blood Gas Lactic Acid, Venous   Result Value Ref Range    POCT Lactate, Venous 7.6 (HH) 0.4 - 2.0 mmol/L    Critical Called By NK     Critical Called To FN     Critical Call Time 17     Critical Read Back Y    Blood Gas Venous Full Panel   Result Value Ref Range    POCT pH, Venous 7.18 (LL) 7.33 - 7.43 pH    POCT pCO2, Venous 46 41 - 51 mm Hg    POCT pO2, Venous 35 35 - 45 mm Hg    POCT SO2, Venous 54 45 - 75 %    POCT Oxy Hemoglobin, Venous 53.2 45.0 - 75.0 %    POCT Hematocrit Calculated, Venous 44.0 41.0 - 52.0 %    POCT Sodium, Venous  128 (L) 136 - 145 mmol/L    POCT Potassium, Venous 5.0 3.5 - 5.3 mmol/L    POCT Chloride, Venous 97 (L) 98 - 107 mmol/L    POCT Ionized Calicum, Venous 1.14 1.10 - 1.33 mmol/L    POCT Glucose, Venous 75 74 - 99 mg/dL    POCT Lactate, Venous 7.6 (HH) 0.4 - 2.0 mmol/L    POCT Base Excess, Venous -11.0 (L) -2.0 - 3.0 mmol/L    POCT HCO3 Calculated, Venous 17.2 (L) 22.0 - 26.0 mmol/L    POCT Hemoglobin, Venous 14.5 13.5 - 17.5 g/dL    POCT Anion Gap, Venous 19.0 10.0 - 25.0 mmol/L    Patient Temperature      FiO2 90 %    Critical Called By NK     Critical Called To FN     Critical Call Time 17     Critical Read Back Y    Lactate   Result Value Ref Range    Lactate 7.2 (HH) 0.4 - 2.0 mmol/L   Blood Gas Arterial Full Panel   Result Value Ref Range    POCT pH, Arterial 7.18 (LL) 7.38 - 7.42 pH    POCT pCO2, Arterial 43 (H) 38 - 42 mm Hg    POCT pO2, Arterial 84 (L) 85 - 95 mm Hg    POCT SO2, Arterial 95 94 - 100 %    POCT Oxy Hemoglobin, Arterial 93.7 (L) 94.0 - 98.0 %    POCT Hematocrit Calculated, Arterial 46.0 41.0 - 52.0 %    POCT Sodium, Arterial 130 (L) 136 - 145 mmol/L    POCT Potassium, Arterial 4.5 3.5 - 5.3 mmol/L    POCT Chloride, Arterial 98 98 - 107 mmol/L    POCT Ionized Calcium, Arterial 1.17 1.10 - 1.33 mmol/L    POCT Glucose, Arterial 105 (H) 74 - 99 mg/dL    POCT Lactate, Arterial 5.5 (HH) 0.4 - 2.0 mmol/L    POCT Base Excess, Arterial -12.0 (L) -2.0 - 3.0 mmol/L    POCT HCO3 Calculated, Arterial 16.0 (L) 22.0 - 26.0 mmol/L    POCT Hemoglobin, Arterial 15.3 13.5 - 17.5 g/dL    POCT Anion Gap, Arterial 21 10 - 25 mmo/L    Patient Temperature      FiO2 60 %    Ventilator Mode A/C     Ventilator Rate 20 bpm    Tidal Volume 500 mL    Peep CHM2O 8.0 cm H2O    Critical Called By STEPHANI JARAMILLO RRT     [facility]     Critical Call Time 258     Critical Read Back Y    Blood Gas Arterial Full Panel   Result Value Ref Range    POCT pH, Arterial 7.26 (L) 7.38 - 7.42 pH    POCT pCO2, Arterial  35 (L) 38 - 42 mm Hg    POCT pO2, Arterial 68 (L) 85 - 95 mm Hg    POCT SO2, Arterial 93 (L) 94 - 100 %    POCT Oxy Hemoglobin, Arterial 91.2 (L) 94.0 - 98.0 %    POCT Hematocrit Calculated, Arterial 44.0 41.0 - 52.0 %    POCT Sodium, Arterial 129 (L) 136 - 145 mmol/L    POCT Potassium, Arterial 4.3 3.5 - 5.3 mmol/L    POCT Chloride, Arterial 98 98 - 107 mmol/L    POCT Ionized Calcium, Arterial 1.11 1.10 - 1.33 mmol/L    POCT Glucose, Arterial 156 (H) 74 - 99 mg/dL    POCT Lactate, Arterial 5.0 (HH) 0.4 - 2.0 mmol/L    POCT Base Excess, Arterial -10.4 (L) -2.0 - 3.0 mmol/L    POCT HCO3 Calculated, Arterial 15.7 (L) 22.0 - 26.0 mmol/L    POCT Hemoglobin, Arterial 14.7 13.5 - 17.5 g/dL    POCT Anion Gap, Arterial 20 10 - 25 mmo/L    Patient Temperature      FiO2 60 %    Ventilator Mode A/C     Ventilator Rate 20 bpm    Tidal Volume 500 mL    Peep CHM2O 8.0 cm H2O    Critical Called By STEPHANI JARAMILLO RRT     Critical Called To [facility]ARA C[facility]     Critical Call Time 545     Critical Read Back Y    MRSA Surveillance for Vancomycin De-escalation, PCR    Specimen: Anterior Nares; Swab   Result Value Ref Range    MRSA PCR Detected (A) Not Detected   Procalcitonin   Result Value Ref Range    Procalcitonin 216.91 (H) <=0.07 ng/mL   Sars-CoV-2 PCR   Result Value Ref Range    Coronavirus 2019, PCR Not Detected Not Detected   Influenza A, and B PCR   Result Value Ref Range    Flu A Result Not Detected Not Detected    Flu B Result Not Detected Not Detected   CBC   Result Value Ref Range    WBC 10.2 4.4 - 11.3 x10*3/uL    nRBC 0.2 (H) 0.0 - 0.0 /100 WBCs    RBC 5.09 4.50 - 5.90 x10*6/uL    Hemoglobin 14.3 13.5 - 17.5 g/dL    Hematocrit 43.8 41.0 - 52.0 %    MCV 86 80 - 100 fL    MCH 28.1 26.0 - 34.0 pg    MCHC 32.6 32.0 - 36.0 g/dL    RDW 15.5 (H) 11.5 - 14.5 %    Platelets 127 (L) 150 - 450 x10*3/uL   Comprehensive Metabolic Panel   Result Value Ref Range    Glucose 156 (H) 74 - 99 mg/dL    Sodium 131 (L) 136 - 145 mmol/L     Potassium 4.1 3.5 - 5.3 mmol/L    Chloride 99 98 - 107 mmol/L    Bicarbonate 19 (L) 21 - 32 mmol/L    Anion Gap 17 10 - 20 mmol/L    Urea Nitrogen 37 (H) 6 - 23 mg/dL    Creatinine 3.97 (H) 0.50 - 1.30 mg/dL    eGFR 16 (L) >60 mL/min/1.73m*2    Calcium 7.8 (L) 8.6 - 10.3 mg/dL    Albumin 3.3 (L) 3.4 - 5.0 g/dL    Alkaline Phosphatase 95 33 - 136 U/L    Total Protein 5.9 (L) 6.4 - 8.2 g/dL     (H) 9 - 39 U/L    Bilirubin, Total 3.3 (H) 0.0 - 1.2 mg/dL     (H) 10 - 52 U/L   Magnesium   Result Value Ref Range    Magnesium 1.42 (L) 1.60 - 2.40 mg/dL   Phosphorus   Result Value Ref Range    Phosphorus 4.8 2.5 - 4.9 mg/dL   POCT GLUCOSE   Result Value Ref Range    POCT Glucose 139 (H) 74 - 99 mg/dL   Transthoracic Echo (TTE) Complete   Result Value Ref Range    AV pk manjit 1.08 m/s    LV Biplane EF 20 %    LVOT diam 2.02 cm    MV E/A ratio 0.79     LA vol index A/L 18.6 ml/m2    LV EF 35 %    RVSP 28.6 mmHg    LVIDd 5.43 cm    Aortic Valve Area by Continuity of Peak Velocity 2.88 cm2    AV pk grad 5 mmHg    LV A4C EF 16.6    Drug Screen, Urine   Result Value Ref Range    Amphetamine Screen, Urine Presumptive Negative Presumptive Negative    Barbiturate Screen, Urine Presumptive Negative Presumptive Negative    Benzodiazepines Screen, Urine Presumptive Positive (A) Presumptive Negative    Cannabinoid Screen, Urine Presumptive Positive (A) Presumptive Negative    Cocaine Metabolite Screen, Urine Presumptive Negative Presumptive Negative    Fentanyl Screen, Urine Presumptive Positive (A) Presumptive Negative    Opiate Screen, Urine Presumptive Negative Presumptive Negative    Oxycodone Screen, Urine Presumptive Negative Presumptive Negative    PCP Screen, Urine Presumptive Negative Presumptive Negative    Methadone Screen, Urine Presumptive Negative Presumptive Negative   Lavender Top   Result Value Ref Range    Extra Tube Hold for add-ons.    Ammonia   Result Value Ref Range    Ammonia 70 (H) 16 - 53 umol/L    Blood Gas Arterial Full Panel   Result Value Ref Range    POCT pH, Arterial 7.30 (L) 7.38 - 7.42 pH    POCT pCO2, Arterial 30 (L) 38 - 42 mm Hg    POCT pO2, Arterial 73 (L) 85 - 95 mm Hg    POCT SO2, Arterial 96 94 - 100 %    POCT Oxy Hemoglobin, Arterial 93.5 (L) 94.0 - 98.0 %    POCT Hematocrit Calculated, Arterial 44.0 41.0 - 52.0 %    POCT Sodium, Arterial 127 (L) 136 - 145 mmol/L    POCT Potassium, Arterial 4.1 3.5 - 5.3 mmol/L    POCT Chloride, Arterial 95 (L) 98 - 107 mmol/L    POCT Ionized Calcium, Arterial 1.07 (L) 1.10 - 1.33 mmol/L    POCT Glucose, Arterial 240 (H) 74 - 99 mg/dL    POCT Lactate, Arterial 5.7 (HH) 0.4 - 2.0 mmol/L    POCT Base Excess, Arterial -10.2 (L) -2.0 - 3.0 mmol/L    POCT HCO3 Calculated, Arterial 14.8 (L) 22.0 - 26.0 mmol/L    POCT Hemoglobin, Arterial 14.7 13.5 - 17.5 g/dL    POCT Anion Gap, Arterial 21 10 - 25 mmo/L    Patient Temperature      FiO2 60 %    Ventilator Rate 20 bpm    Tidal Volume 500 mL    Peep CHM2O 8.0 cm H2O    Critical Called By ARON     Critical Called To DR ROHTMAN     Critical Call Time 949     Critical Read Back Y    Comprehensive metabolic panel   Result Value Ref Range    Glucose 261 (H) 74 - 99 mg/dL    Sodium 127 (L) 136 - 145 mmol/L    Potassium 3.8 3.5 - 5.3 mmol/L    Chloride 96 (L) 98 - 107 mmol/L    Bicarbonate 19 (L) 21 - 32 mmol/L    Anion Gap 16 10 - 20 mmol/L    Urea Nitrogen 40 (H) 6 - 23 mg/dL    Creatinine 3.73 (H) 0.50 - 1.30 mg/dL    eGFR 18 (L) >60 mL/min/1.73m*2    Calcium 8.0 (L) 8.6 - 10.3 mg/dL    Albumin 3.1 (L) 3.4 - 5.0 g/dL    Alkaline Phosphatase 79 33 - 136 U/L    Total Protein 5.6 (L) 6.4 - 8.2 g/dL     (H) 9 - 39 U/L    Bilirubin, Total 3.4 (H) 0.0 - 1.2 mg/dL     (H) 10 - 52 U/L   POCT GLUCOSE   Result Value Ref Range    POCT Glucose 190 (H) 74 - 99 mg/dL     *Note: Due to a large number of results and/or encounters for the requested time period, some results have not been displayed. A complete set of results can  be found in Results Review.    Transthoracic Echo (TTE) Complete    Result Date: 11/18/2024            [facility] [id number], [place] [id number]    Tel [phone] Fax [phone] TRANSTHORACIC ECHOCARDIOGRAM REPORT Patient Name:       CARMELO NÚÑEZ EDINSON       Reading Physician:    [id number] Carmelo Harrison MD Study Date:         11/18/2024           Ordering Provider:    [id number] KIARA COREAS MRN/PID:            [id number]             Fellow: Accession#:         [id number]         Nurse:                Nya Esquivel RN Date of Birth/Age:  1960 / 64 years Sonographer:           Gender Assigned at                      Additional Staff: Birth: Height:             167.64 cm            Admit Date: Weight:             97.98 kg             Admission Status:     Inpatient -                                                                Routine BSA / BMI:          2.07 m2 / 34.86      Department Location:  [facility] ICU Back                     kg/m2                                      (27-34) Blood Pressure: 93 /60 mmHg Study Type:    TRANSTHORACIC ECHO (TTE) COMPLETE Diagnosis/ICD: Severe sepsis with septic shock-R65.21 Indication:    septic shock CPT Codes:     Echo Complete w Full Doppler-14845  Study Detail: The following Echo studies were performed: 2D, M-Mode, Doppler and               color flow. Technically challenging study due to patient lying in               supine position, body habitus and poor acoustic windows. The               patient is intubated. Definity used as a contrast agent for               endocardial border definition. Total contrast used for this               procedure was 3 mL via IV push. The patient was sedated.  PHYSICIAN INTERPRETATION: Left Ventricle: Left ventricular ejection  fraction is moderately decreased, by visual estimate at 35%. There is global hypokinesis of the left ventricle with minor regional variations. The left ventricular cavity size is normal. There is mild increased septal and normal posterior left ventricular wall thickness. Spectral Doppler shows a Grade I (impaired relaxation pattern) of left ventricular diastolic filling with normal left atrial filling pressure. Left Atrium: The left atrium is normal in size. Right Ventricle: The right ventricle is normal in size. There is normal right ventricular global systolic function. Right Atrium: The right atrium is normal in size. Aortic Valve: The aortic valve is trileaflet. There is no evidence of aortic valve regurgitation. The peak instantaneous gradient of the aortic valve is 5 mmHg. Mitral Valve: The mitral valve is normal in structure. There is no evidence of mitral valve regurgitation. Tricuspid Valve: The tricuspid valve is structurally normal. There is mild tricuspid regurgitation. The Doppler estimated RVSP is within normal limits at 28.6 mmHg. Pulmonic Valve: The pulmonic valve is structurally normal. There is no indication of pulmonic valve regurgitation. Pericardium: No pericardial effusion noted. Aorta: The aortic root is normal.  CONCLUSIONS:  1. Left ventricular ejection fraction is moderately decreased, by visual estimate at 35%.  2. There is global hypokinesis of the left ventricle with minor regional variations.  3. There is normal right ventricular global systolic function.  4. Right ventricular within normal limits. QUANTITATIVE DATA SUMMARY:  2D MEASUREMENTS:            Normal Ranges: LAs:             3.63 cm    (2.7-4.0cm) IVSd:            1.04 cm    (0.6-1.1cm) LVPWd:           0.98 cm    (0.6-1.1cm) LVIDd:           5.43 cm    (3.9-5.9cm) LVIDs:           4.30 cm LV Mass Index:   102.4 g/m2 LV % FS          20.9 %  LA VOLUME:                   Normal Ranges: LA Vol A4C:       28.4 ml    (22+/-6mL/m2)  LA Vol A2C:       45.1 ml LA Vol BP:        38.5 ml LA Vol Index A4C: 13.7 ml/m2 LA Vol Index A2C: 21.8 ml/m2 LA Vol Index BP:  18.6 ml/m2 LA Vol A4C:       27.2 ml LA Vol A2C:       43.0 ml LA Vol Index BSA: 17.0 ml/m2  M-MODE MEASUREMENTS:         Normal Ranges: AoV Exc:             2.02 cm (1.5-2.5cm)  AORTA MEASUREMENTS:         Normal Ranges: AoV Exc:            2.02 cm (1.5-2.5cm)  LV SYSTOLIC FUNCTION BY 2D PLANIMETRY (MOD):                      Normal Ranges: EF-A4C View:    17 % (>=55%) EF-A2C View:    34 % EF-Biplane:     20 % EF-Visual:      35 % LV EF Reported: 35 %  LV DIASTOLIC FUNCTION:          Normal Ranges: MV Peak E:             0.68 m/s (0.7-1.2 m/s) MV Peak A:             0.86 m/s (0.42-0.7 m/s) E/A Ratio:             0.79     (1.0-2.2)  MITRAL VALVE:          Normal Ranges: MV DT:        138 msec (150-240msec)  AORTIC VALVE:           Normal Ranges: AoV Vmax:      1.08 m/s (<=1.7m/s) AoV Peak P.7 mmHg (<20mmHg) LVOT Max Allan:  0.97 m/s (<=1.1m/s) LVOT Diameter: 2.02 cm  (1.8-2.4cm) AoV Area,Vmax: 2.88 cm2 (2.5-4.5cm2)  RIGHT VENTRICLE: RV Major 7.6 cm  TRICUSPID VALVE/RVSP:          Normal Ranges: Peak TR Velocity:     2.53 m/s RV Syst Pressure:     29 mmHg  (< 30mmHg) IVC Diam:             1.94 cm  AORTA: Asc Ao Diam 3.48 cm  [id number] Robel Harrison MD Electronically signed on 2024 at 9:51:24 AM  ** Final **     ECG 12 lead    Result Date: 2024  Sinus tachycardia Nonspecific T wave abnormality Abnormal ECG When compared with ECG of 2024 21:37, (unconfirmed) Sinus rhythm has replaced Junctional rhythm Nonspecific T wave abnormality now evident in Inferior leads    ECG 12 lead    Result Date: 2024  Accelerated Junctional rhythm Nonspecific ST and T wave abnormality Abnormal ECG When compared with ECG of 21-MAY-2023 08:36, Junctional rhythm has replaced Sinus rhythm Vent. rate has increased BY  37 BPM Nonspecific T wave abnormality now evident in Lateral leads QT has  lengthened    XR chest 1 view    Result Date: 11/18/2024  Interpreted By:  Lionel Sanders, STUDY: XR CHEST 1 VIEW;  11/18/2024 6:07 am   INDICATION: Signs/Symptoms:RIJ CVC.   COMPARISON: None.   ACCESSION NUMBER(S): [id number]   ORDERING CLINICIAN: KIARA COREAS   FINDINGS:   CARDIOMEDIASTINAL SILHOUETTE: Cardiomediastinal silhouette is normal in size and configuration.   LUNGS/PLEURA: Mild bibasilar subsegmental atelectasis. There are no consolidations.There are no pleural effusions. There is no demonstrated pneumothorax.   LINES/TUBES: Interval placement of right internal jugular central venous catheter terminating in the superior vena cava. Endotracheal tube terminates 3.5 cm above the bryant. Enteric tube courses below the diaphragm outside of the field of view.   BONES: No evidence of acute osseous abnormality.       1.  Interval placement of right internal jugular central venous catheter terminating in superior vena cava. Endotracheal and enteric tube in place.     Signed by: Lionel Sanders 11/18/2024 6:23 AM Dictation workstation:   [id number]    XR chest 1 view    Result Date: 11/18/2024  Interpreted By:  Gregorio Saenz, STUDY: XR CHEST 1 VIEW; XR ABDOMEN 1 VIEW;  11/18/2024 2:48 am   INDICATION: Signs/Symptoms:ETT; Signs/Symptoms:OGT.   COMPARISON: 5/21/2023   ACCESSION NUMBER(S): [id number]; [id number]   ORDERING CLINICIAN: KIARA COREAS   FINDINGS: Endotracheal tube tip terminates approximately 4.7 cm superior to the bryant. Nasogastric tube tip terminates in the left upper abdomen at the level of the stomach. The cardiac silhouette is stable in size. There is asymmetric opacity in the right upper lung and also right basilar opacity. No significant pleural effusion. No pneumothorax. There is nonspecific bowel gas pattern.       Satisfactory position endotracheal tube and nasogastric tube.   Asymmetric opacity in the right upper lung and also right basilar opacity concerning for infiltrate.   MACRO:  None   Signed by: Gregorio Saenz 11/18/2024 2:52 AM Dictation workstation:   [id number]    XR abdomen 1 view    Result Date: 11/18/2024  Interpreted By:  Gregorio Saenz, STUDY: XR CHEST 1 VIEW; XR ABDOMEN 1 VIEW;  11/18/2024 2:48 am   INDICATION: Signs/Symptoms:ETT; Signs/Symptoms:OGT.   COMPARISON: 5/21/2023   ACCESSION NUMBER(S): [id number]; [id number]   ORDERING CLINICIAN: KIARA COREAS   FINDINGS: Endotracheal tube tip terminates approximately 4.7 cm superior to the bryant. Nasogastric tube tip terminates in the left upper abdomen at the level of the stomach. The cardiac silhouette is stable in size. There is asymmetric opacity in the right upper lung and also right basilar opacity. No significant pleural effusion. No pneumothorax. There is nonspecific bowel gas pattern.       Satisfactory position endotracheal tube and nasogastric tube.   Asymmetric opacity in the right upper lung and also right basilar opacity concerning for infiltrate.   MACRO: None   Signed by: Gregorio Saenz 11/18/2024 2:52 AM Dictation workstation:   [id number]    CT angio chest abdomen pelvis    Result Date: 11/18/2024  Interpreted By:  Gregorio Saenz, STUDY: CT ANGIO CHEST ABDOMEN PELVIS;  11/17/2024 11:42 pm   INDICATION: Signs/Symptoms:Hypotensive, hypoxic, traumatic Ayon, blood loss.   COMPARISON: 10/3/2023   ACCESSION NUMBER(S): [id number]   ORDERING CLINICIAN: PAZ POTTER   TECHNIQUE: Contiguous axial images of the chest, abdomen and pelvis were obtained with and without intravenous contrast. Coronal and sagittal reformatted images were obtained from the axial images. MIPS and 3D reformatted images were also performed and reviewed.   FINDINGS: CT CHEST:   The examination is limited secondary to body habitus, motion, and beam hardening artifact secondary to inferior position of the patient's arms.   No axillary, mediastinal, hilar lymphadenopathy.   The heart is normal in size. No significant pericardial effusion. No evidence  of acute central, main, or lobar pulmonary embolism. Evaluation for more distal emboli is limited secondary to patient respiratory motion and lung parenchymal opacities. No evidence of aortic aneurysm or dissection. The ascending aorta measures 3.1 cm in diameter.   Evaluation of the lungs is limited secondary to respiratory motion. There are atelectatic/consolidative opacities in the bilateral lower lobes. No significant pleural effusion. No pneumothorax.   Multilevel degenerative change of the thoracic spine.   CT ABDOMEN PELVIS:   There is hepatic steatosis. Postsurgical change of cholecystectomy.   The pancreas, spleen, and adrenal glands appear unremarkable.   Symmetric enhancement of the kidneys. A subcentimeter hypodensity in the lower pole the right kidney is too small to characterize. No hydronephrosis.   No evidence of abdominal aortic aneurysm or dissection.   No evidence of bowel obstruction the appendix is surgically absent. There is a moderate amount of stool in the rectum.   There is a Ayon catheter and the urinary bladder is underdistended and not well evaluated. There is however evidence of urinary bladder wall thickening and edema.   Band of density in the midline anterior pelvic wall set site of previously seen suprapubic bladder catheter.   Bilateral fat containing inguinal hernias.   Multilevel degenerative change of the lumbar spine.       No evidence of aortic aneurysm or dissection.   No evidence of acute pulmonary embolism.   Atelectatic/consolidative opacities in the bilateral lower lobes.   Hepatic steatosis and postsurgical change of cholecystectomy.   A Ayon catheter is present and the urinary bladder is underdistended and not well evaluated. There is however urinary bladder wall thickening and edema compatible cystitis and clinical correlation with urinalysis is recommended.   Band of density in the midline anterior pelvic wall at site of previously seen suprapubic urinary bladder  catheter.   Moderate amount of stool in the sigmoid colon and rectum.   MACRO: None   Signed by: Gregorio Rodriguezcher 11/18/2024 12:45 AM Dictation workstation:   [id number]       Assessment/Plan   IMP:  Patient condition has become more critical and discussions were beginning about whether to start CRRT. Family arrived and stated patient was very clear he did not want these types of interventions. He was a paraplegic patient who's quality of life had been declining over the past couple of years. He lived in a NH setting, had lost much of his vision and hearing and was wheelchair bound. When he presented in the ER the niece agreed to intubation because she thought it would be a couple of days and he would be OK. Now that she realizes his kidneys are failing as well as the liver- she wishes to reinitiate the DNRCC and have the ventilator removed and engage Hospice in his care as he was clear he would not want this.   DNRCC placed on chart and HOWR consult placed. Family will call other family members and plan to remove patient from life support later today. If patient stabilizes they would like him moved to [place] for care in his final days.       Provider estimate of survival: hours to days  Patient Prognostic Awareness: Patient unable to respond    Patient/proxy preference for information  Prefers full information    Goals of Care  Comfort  Good communication    Is the patient hospice-eligible?   Yes  Was a discussion held re hospice services?   yes  Was a decision made re hospice services?  Yes    Other Palliative Support  Emotional support for family  Hospice support for patient    I spent one hour in the planning, review and care of this patient.       Francie Wisdom, LINETTE-CNS

## 2024-11-18 NOTE — NURSING NOTE
0210: Pt arrived to ICU.  Eyes closed. Spontaneous movements of BUE. Resp tachypneic, labored.  Bipap in place. RRT, DARRELL Nguyễn CNP at bedside. Concern for resp fatigue. Preparation for intubation in progress.  Pt hypotensive. See MAR for pressor titration.     0224: Time out performed.  7.0 ETT placed by DARRELL Nguyễn CNP.  [place] scope assisted.  First pass success.  No significant hemodynamic alterations. See provider's note for  procedure narrative and definitive airway confirmation.     0400:  Poor PIV access for mutiple infusions.  Attempts per  ICU RN staff unsuccessful.     0500: Time out performed. Right radial arterial line placed by DARRELL Nguyễn CNP. See flowsheet for assessment and interventions.     0530: Time our performed.  RIJ TLC placed by DARRELL Nguyễn CNP. See flowsheet for assessment and interventions.  Awaiting radiology read prior to use.

## 2024-11-18 NOTE — H&P
History Of Present Illness  Robel Eastman is a 64 y.o. male with PMH of paraplegia, MDD, HLD, GERD, HTN, asthma, glaucoma and urinary retention with smith who presented from SNF for traumatic smith, nausea, and vomiting. Per EMS report, SNF RN reported issue with smith so they replaced it and then had bleeding and abdominal pain, and vomited. Currently, he is lethargic and tachypniec. He is able to answer some one word questions. He was able to state his name, that he was in the hospital and that it is 2024. He is unable to answer if he is okay with intubation or mechanical ventilation if needed. His emergency contact, Ludy was contacted and consented for intubation and central line placement.     ED course: Initial vitals-T37.2, , RR 30, BP 77/50, SpO2 75% on RA. EKG showed sinus tachycardia, Qtc 489, , no ST elevation.  Labs significant for RAYNE with BUN/CR 26/3.76, transaminitis with , , hyperbilirubinemia 1.7, elevated troponin 145->128, lactic acidosis 6.7-> 7.2, hyponatremia 133, UA +leuks and RBCs. Urine and blood cultures sent, and elevated INR 1.7. Initial ABG showed ph 7.30/pCO2 31/pO2 55/lactate 6.0 with repeat VBG showing pH 7.18/pCO2 46/lactate 7.6. CT C/A/P showed atelectasis/consolidative opacities in bilateral lower lobes, hepatic steatosis, Smith present and urinary bladder under distended, however there is urinary bladder wall thickening and edema compatible with cystitis, and moderate amount of stool in sigmoid colon and rectum. He was given azithromycin/cefepime/Vanc, TXA and 1 unit PRBC for traumatic smith penile bleeding with hypotension, droperidol, Duonebs, Solumedrol, 30 ml/kg IVF bolus, and started on norepinephrine for hypotension. He was placed on NIPPV with BiPAP at 100% FiO2. He is admitted to ICU for close hemodynamic and respiratory monitoring.      Past Medical History  Past Medical History:   Diagnosis Date    Age-related nuclear cataract, left eye  01/08/2019    Age-related nuclear cataract, left    Age-related nuclear cataract, right eye 01/08/2019    Age-related nuclear cataract, right    Other specified disorders of nose and nasal sinuses 12/06/2019    Nasal vestibulitis    Spondylosis without myelopathy or radiculopathy, site unspecified 10/12/2017    Degenerative spinal arthritis       Surgical History  Past Surgical History:   Procedure Laterality Date    OTHER SURGICAL HISTORY  12/06/2019    Hand surgery    OTHER SURGICAL HISTORY  07/26/2019    Cataract surgery    OTHER SURGICAL HISTORY  07/26/2019    Cholecystectomy laparoscopic        Social History  He reports that he has never smoked. He has never used smokeless tobacco. No history on file for alcohol use and drug use.    Family History  No family history on file.     Allergies  Penicillin and Penicillins    Review of Systems   Unable to perform ROS: Severe respiratory distress        Physical Exam  Constitutional:       General: He is in acute distress.      Appearance: He is ill-appearing.   HENT:      Head: Normocephalic.      Mouth/Throat:      Mouth: Mucous membranes are dry.   Eyes:      Extraocular Movements: Extraocular movements intact.      Conjunctiva/sclera: Conjunctivae normal.   Cardiovascular:      Rate and Rhythm: Regular rhythm. Tachycardia present.      Pulses: Normal pulses.      Heart sounds: Normal heart sounds.   Pulmonary:      Breath sounds: Rhonchi present.      Comments: tachypnea  Abdominal:      General: There is distension.      Palpations: Abdomen is soft.      Tenderness: There is no abdominal tenderness. There is no guarding.      Comments: Hypoactive bowel sounds   Genitourinary:     Comments: Bleeding noted from urethera, some blood clots in yellow urine in smith catheter  Musculoskeletal:      Cervical back: Normal range of motion and neck supple.      Right lower leg: No edema.      Left lower leg: No edema.   Skin:     General: Skin is warm and dry.  "  Neurological:      Mental Status: He is oriented to person, place, and time.      Comments: Drowsy, wakes to verbal stimuli only long enough to answer some questions with one word        Last Recorded Vitals  Blood pressure 105/55, pulse (!) 126, temperature 37.4 °C (99.3 °F), temperature source Temporal, resp. rate (!) 42, height 1.676 m (5' 6\"), weight 100 kg (220 lb 7.4 oz), SpO2 97%.    Relevant Results      Scheduled medications  azithromycin, 500 mg, intravenous, Daily  cefepime, 1 g, intravenous, q12h  etomidate, 15 mg, intravenous, Once  heparin (porcine), 5,000 Units, subcutaneous, q8h  oxygen, , inhalation, Continuous - Inhalation  pantoprazole, 40 mg, oral, Daily before breakfast   Or  pantoprazole, 40 mg, intravenous, Daily before breakfast  rocuronium, 40 mg, intravenous, Once  tranexamic acid, 1,000 mg, intravenous, Once      Continuous medications  fentaNYL, 0-300 mcg/hr, Last Rate: 75 mcg/hr (11/18/24 0247)  norepinephrine, 0-0.2 mcg/kg/min, Last Rate: 0.07 mcg/kg/min (11/18/24 0200)  sodium bicarbonate, 100 mL/hr, Last Rate: 100 mL/hr (11/18/24 5028)      PRN medications  PRN medications: fentaNYL  Results for orders placed or performed during the hospital encounter of 11/17/24 (from the past 24 hours)   CBC and Auto Differential   Result Value Ref Range    WBC 8.0 4.4 - 11.3 x10*3/uL    nRBC 0.0 0.0 - 0.0 /100 WBCs    RBC 5.26 4.50 - 5.90 x10*6/uL    Hemoglobin 14.9 13.5 - 17.5 g/dL    Hematocrit 45.4 41.0 - 52.0 %    MCV 86 80 - 100 fL    MCH 28.3 26.0 - 34.0 pg    MCHC 32.8 32.0 - 36.0 g/dL    RDW 15.3 (H) 11.5 - 14.5 %    Platelets 143 (L) 150 - 450 x10*3/uL    Immature Granulocytes %, Automated 0.6 0.0 - 0.9 %    Immature Granulocytes Absolute, Automated 0.05 0.00 - 0.70 x10*3/uL   Comprehensive Metabolic Panel   Result Value Ref Range    Glucose 90 74 - 99 mg/dL    Sodium 135 (L) 136 - 145 mmol/L    Potassium 3.5 3.5 - 5.3 mmol/L    Chloride 97 (L) 98 - 107 mmol/L    Bicarbonate 22 21 - 32 " mmol/L    Anion Gap 20 10 - 20 mmol/L    Urea Nitrogen 26 (H) 6 - 23 mg/dL    Creatinine 3.76 (H) 0.50 - 1.30 mg/dL    eGFR 17 (L) >60 mL/min/1.73m*2    Calcium 9.1 8.6 - 10.3 mg/dL    Albumin 3.9 3.4 - 5.0 g/dL    Alkaline Phosphatase 129 33 - 136 U/L    Total Protein 7.1 6.4 - 8.2 g/dL     (H) 9 - 39 U/L    Bilirubin, Total 1.7 (H) 0.0 - 1.2 mg/dL     (H) 10 - 52 U/L   Troponin I, High Sensitivity   Result Value Ref Range    Troponin I, High Sensitivity 145 (HH) 0 - 20 ng/L   Manual Differential   Result Value Ref Range    Neutrophils %, Manual 51.0 40.0 - 80.0 %    Bands %, Manual 34.0 0.0 - 5.0 %    Lymphocytes %, Manual 4.0 13.0 - 44.0 %    Monocytes %, Manual 3.0 2.0 - 10.0 %    Eosinophils %, Manual 0.0 0.0 - 6.0 %    Basophils %, Manual 0.0 0.0 - 2.0 %    Metamyelocytes %, Manual 6.0 0.0 - 0.0 %    Myelocytes %, Manual 2.0 0.0 - 0.0 %    Seg Neutrophils Absolute, Manual 4.08 1.20 - 7.00 x10*3/uL    Bands Absolute, Manual 2.72 (H) 0.00 - 0.70 x10*3/uL    Lymphocytes Absolute, Manual 0.32 (L) 1.20 - 4.80 x10*3/uL    Monocytes Absolute, Manual 0.24 0.10 - 1.00 x10*3/uL    Eosinophils Absolute, Manual 0.00 0.00 - 0.70 x10*3/uL    Basophils Absolute, Manual 0.00 0.00 - 0.10 x10*3/uL    Metamyelocytes Absolute, Manual 0.48 0.00 - 0.00 x10*3/uL    Myelocytes Absolute, Manual 0.16 0.00 - 0.00 x10*3/uL    Total Cells Counted 100     Neutrophils Absolute, Manual 6.80 1.20 - 7.70 x10*3/uL    RBC Morphology See Below     [place] Cells Few    POCT GLUCOSE   Result Value Ref Range    POCT Glucose 77 74 - 99 mg/dL   Blood Gas Arterial Full Panel Unsolicited   Result Value Ref Range    POCT pH, Arterial 7.30 (L) 7.38 - 7.42 pH    POCT pCO2, Arterial 31 (L) 38 - 42 mm Hg    POCT pO2, Arterial 55 (L) 85 - 95 mm Hg    POCT SO2, Arterial 87 (L) 94 - 100 %    POCT Oxy Hemoglobin, Arterial 85.1 (L) 94.0 - 98.0 %    POCT Hematocrit Calculated, Arterial 41.0 41.0 - 52.0 %    POCT Sodium, Arterial 130 (L) 136 - 145 mmol/L     POCT Potassium, Arterial 3.6 3.5 - 5.3 mmol/L    POCT Chloride, Arterial 99 98 - 107 mmol/L    POCT Ionized Calcium, Arterial 1.19 1.10 - 1.33 mmol/L    POCT Glucose, Arterial 92 74 - 99 mg/dL    POCT Lactate, Arterial 6.0 (HH) 0.4 - 2.0 mmol/L    POCT Base Excess, Arterial -9.9 (L) -2.0 - 3.0 mmol/L    POCT HCO3 Calculated, Arterial 15.3 (L) 22.0 - 26.0 mmol/L    POCT Hemoglobin, Arterial 13.7 13.5 - 17.5 g/dL    POCT Anion Gap, Arterial 19 10 - 25 mmo/L    Patient Temperature      Critical Called By NK     Critical Called To [facility]     Critical Call Time 2146     Critical Read Back Y     Site of Arterial Puncture LR     Nelson's Test YES    Lactate   Result Value Ref Range    Lactate 6.7 (HH) 0.4 - 2.0 mmol/L   Urinalysis with Reflex Culture and Microscopic   Result Value Ref Range    Color, Urine Red (N) Straw, Yellow    Appearance, Urine Turbid (N) Clear    Specific Gravity, Urine 1.025 1.005 - 1.035    pH, Urine 7.0 5.0, 5.5, 6.0, 6.5, 7.0, 7.5, 8.0    Protein, Urine 100 (2+) (A) NEGATIVE, 10 (TRACE) mg/dL    Glucose, Urine NEGATIVE NEGATIVE mg/dL    Blood, Urine 1.0 (3+) (A) NEGATIVE    Ketones, Urine NEGATIVE NEGATIVE mg/dL    Bilirubin, Urine NEGATIVE NEGATIVE    Urobilinogen, Urine NORM NORM mg/dL    Nitrite, Urine NEGATIVE NEGATIVE    Leukocyte Esterase, Urine 250 [place]/µL (A) NEGATIVE   Microscopic Only, Urine   Result Value Ref Range    WBC, Urine 1-5 1-5, NONE /HPF    RBC, Urine >20 (A) NONE, 1-2, 3-5 /HPF    Squamous Epithelial Cells, Urine 1-9 (SPARSE) Reference range not established. /HPF   Type and screen   Result Value Ref Range    ANTIBODY SCREEN NEG    Abo/Rh   Result Value Ref Range    ABO TYPE O     Rh TYPE POS    Lactate   Result Value Ref Range    Lactate 6.4 (HH) 0.4 - 2.0 mmol/L   Renal function panel   Result Value Ref Range    Glucose 84 74 - 99 mg/dL    Sodium 133 (L) 136 - 145 mmol/L    Potassium 3.8 3.5 - 5.3 mmol/L    Chloride 101 98 - 107 mmol/L    Bicarbonate 18 (L) 21 - 32 mmol/L     Anion Gap 18 10 - 20 mmol/L    Urea Nitrogen 28 (H) 6 - 23 mg/dL    Creatinine 3.67 (H) 0.50 - 1.30 mg/dL    eGFR 18 (L) >60 mL/min/1.73m*2    Calcium 8.4 (L) 8.6 - 10.3 mg/dL    Phosphorus 3.0 2.5 - 4.9 mg/dL    Albumin 3.3 (L) 3.4 - 5.0 g/dL   Troponin I, High Sensitivity   Result Value Ref Range    Troponin I, High Sensitivity 128 (HH) 0 - 20 ng/L   Protime-INR   Result Value Ref Range    Protime 18.7 (H) 9.8 - 12.8 seconds    INR 1.7 (H) 0.9 - 1.1   VERIFY ABO/Rh Group Test   Result Value Ref Range    ABO TYPE O     Rh TYPE POS    Acute Toxicology Panel, Blood   Result Value Ref Range    Acetaminophen <10.0 10.0 - 30.0 ug/mL    Salicylate  <3 4 - 20 mg/dL    Alcohol <10 <=10 mg/dL   Blood Gas Lactic Acid, Venous   Result Value Ref Range    POCT Lactate, Venous 7.6 (HH) 0.4 - 2.0 mmol/L    Critical Called By NK     Critical Called To FN     Critical Call Time 17     Critical Read Back Y    Blood Gas Venous Full Panel   Result Value Ref Range    POCT pH, Venous 7.18 (LL) 7.33 - 7.43 pH    POCT pCO2, Venous 46 41 - 51 mm Hg    POCT pO2, Venous 35 35 - 45 mm Hg    POCT SO2, Venous 54 45 - 75 %    POCT Oxy Hemoglobin, Venous 53.2 45.0 - 75.0 %    POCT Hematocrit Calculated, Venous 44.0 41.0 - 52.0 %    POCT Sodium, Venous 128 (L) 136 - 145 mmol/L    POCT Potassium, Venous 5.0 3.5 - 5.3 mmol/L    POCT Chloride, Venous 97 (L) 98 - 107 mmol/L    POCT Ionized Calicum, Venous 1.14 1.10 - 1.33 mmol/L    POCT Glucose, Venous 75 74 - 99 mg/dL    POCT Lactate, Venous 7.6 (HH) 0.4 - 2.0 mmol/L    POCT Base Excess, Venous -11.0 (L) -2.0 - 3.0 mmol/L    POCT HCO3 Calculated, Venous 17.2 (L) 22.0 - 26.0 mmol/L    POCT Hemoglobin, Venous 14.5 13.5 - 17.5 g/dL    POCT Anion Gap, Venous 19.0 10.0 - 25.0 mmol/L    Patient Temperature      FiO2 90 %    Critical Called By NK     Critical Called To FN     Critical Call Time 17     Critical Read Back Y    Lactate   Result Value Ref Range    Lactate 7.2 (HH) 0.4 - 2.0 mmol/L   Blood Gas  Arterial Full Panel   Result Value Ref Range    POCT pH, Arterial 7.18 (LL) 7.38 - 7.42 pH    POCT pCO2, Arterial 43 (H) 38 - 42 mm Hg    POCT pO2, Arterial 84 (L) 85 - 95 mm Hg    POCT SO2, Arterial 95 94 - 100 %    POCT Oxy Hemoglobin, Arterial 93.7 (L) 94.0 - 98.0 %    POCT Hematocrit Calculated, Arterial 46.0 41.0 - 52.0 %    POCT Sodium, Arterial 130 (L) 136 - 145 mmol/L    POCT Potassium, Arterial 4.5 3.5 - 5.3 mmol/L    POCT Chloride, Arterial 98 98 - 107 mmol/L    POCT Ionized Calcium, Arterial 1.17 1.10 - 1.33 mmol/L    POCT Glucose, Arterial 105 (H) 74 - 99 mg/dL    POCT Lactate, Arterial 5.5 (HH) 0.4 - 2.0 mmol/L    POCT Base Excess, Arterial -12.0 (L) -2.0 - 3.0 mmol/L    POCT HCO3 Calculated, Arterial 16.0 (L) 22.0 - 26.0 mmol/L    POCT Hemoglobin, Arterial 15.3 13.5 - 17.5 g/dL    POCT Anion Gap, Arterial 21 10 - 25 mmo/L    Patient Temperature      FiO2 60 %    Ventilator Mode A/C     Ventilator Rate 20 bpm    Tidal Volume 500 mL    Peep CHM2O 8.0 cm H2O    Critical Called By STEPHANI JARAMILLO RRT     Critical Called To KIARA COREAS [facility]     Critical Call Time 258     Critical Read Back Y      *Note: Due to a large number of results and/or encounters for the requested time period, some results have not been displayed. A complete set of results can be found in Results Review.     XR chest 1 view    Result Date: 11/18/2024  Interpreted By:  Gregorio Saenz, STUDY: XR CHEST 1 VIEW; XR ABDOMEN 1 VIEW;  11/18/2024 2:48 am   INDICATION: Signs/Symptoms:ETT; Signs/Symptoms:OGT.   COMPARISON: 5/21/2023   ACCESSION NUMBER(S): [id number]; [id number]   ORDERING CLINICIAN: KIARA COREAS   FINDINGS: Endotracheal tube tip terminates approximately 4.7 cm superior to the bryant. Nasogastric tube tip terminates in the left upper abdomen at the level of the stomach. The cardiac silhouette is stable in size. There is asymmetric opacity in the right upper lung and also right basilar opacity. No significant pleural effusion.  No pneumothorax. There is nonspecific bowel gas pattern.       Satisfactory position endotracheal tube and nasogastric tube.   Asymmetric opacity in the right upper lung and also right basilar opacity concerning for infiltrate.   MACRO: None   Signed by: Gregorio Saenz 11/18/2024 2:52 AM Dictation workstation:   [id number]    XR abdomen 1 view    Result Date: 11/18/2024  Interpreted By:  Gregorio Saenz, STUDY: XR CHEST 1 VIEW; XR ABDOMEN 1 VIEW;  11/18/2024 2:48 am   INDICATION: Signs/Symptoms:ETT; Signs/Symptoms:OGT.   COMPARISON: 5/21/2023   ACCESSION NUMBER(S): [id number]; [id number]   ORDERING CLINICIAN: KIARA COREAS   FINDINGS: Endotracheal tube tip terminates approximately 4.7 cm superior to the bryant. Nasogastric tube tip terminates in the left upper abdomen at the level of the stomach. The cardiac silhouette is stable in size. There is asymmetric opacity in the right upper lung and also right basilar opacity. No significant pleural effusion. No pneumothorax. There is nonspecific bowel gas pattern.       Satisfactory position endotracheal tube and nasogastric tube.   Asymmetric opacity in the right upper lung and also right basilar opacity concerning for infiltrate.   MACRO: None   Signed by: Gregorio Saenz 11/18/2024 2:52 AM Dictation workstation:   [id number]    CT angio chest abdomen pelvis    Result Date: 11/18/2024  Interpreted By:  Gregorio Saenz, STUDY: CT ANGIO CHEST ABDOMEN PELVIS;  11/17/2024 11:42 pm   INDICATION: Signs/Symptoms:Hypotensive, hypoxic, traumatic Ayon, blood loss.   COMPARISON: 10/3/2023   ACCESSION NUMBER(S): [id number]   ORDERING CLINICIAN: PAZ POTTER   TECHNIQUE: Contiguous axial images of the chest, abdomen and pelvis were obtained with and without intravenous contrast. Coronal and sagittal reformatted images were obtained from the axial images. MIPS and 3D reformatted images were also performed and reviewed.   FINDINGS: CT CHEST:   The examination is limited secondary to  body habitus, motion, and beam hardening artifact secondary to inferior position of the patient's arms.   No axillary, mediastinal, hilar lymphadenopathy.   The heart is normal in size. No significant pericardial effusion. No evidence of acute central, main, or lobar pulmonary embolism. Evaluation for more distal emboli is limited secondary to patient respiratory motion and lung parenchymal opacities. No evidence of aortic aneurysm or dissection. The ascending aorta measures 3.1 cm in diameter.   Evaluation of the lungs is limited secondary to respiratory motion. There are atelectatic/consolidative opacities in the bilateral lower lobes. No significant pleural effusion. No pneumothorax.   Multilevel degenerative change of the thoracic spine.   CT ABDOMEN PELVIS:   There is hepatic steatosis. Postsurgical change of cholecystectomy.   The pancreas, spleen, and adrenal glands appear unremarkable.   Symmetric enhancement of the kidneys. A subcentimeter hypodensity in the lower pole the right kidney is too small to characterize. No hydronephrosis.   No evidence of abdominal aortic aneurysm or dissection.   No evidence of bowel obstruction the appendix is surgically absent. There is a moderate amount of stool in the rectum.   There is a Ayon catheter and the urinary bladder is underdistended and not well evaluated. There is however evidence of urinary bladder wall thickening and edema.   Band of density in the midline anterior pelvic wall set site of previously seen suprapubic bladder catheter.   Bilateral fat containing inguinal hernias.   Multilevel degenerative change of the lumbar spine.       No evidence of aortic aneurysm or dissection.   No evidence of acute pulmonary embolism.   Atelectatic/consolidative opacities in the bilateral lower lobes.   Hepatic steatosis and postsurgical change of cholecystectomy.   A Ayon catheter is present and the urinary bladder is underdistended and not well evaluated. There is  however urinary bladder wall thickening and edema compatible cystitis and clinical correlation with urinalysis is recommended.   Band of density in the midline anterior pelvic wall at site of previously seen suprapubic urinary bladder catheter.   Moderate amount of stool in the sigmoid colon and rectum.   MACRO: None   Signed by: Gregorio Saenz 11/18/2024 12:45 AM Dictation workstation:   [id number]        Assessment/Plan   Assessment & Plan  Septic shock (Multi)    Acute hypoxic respiratory failure (Multi)    Pneumonia    RAYNE (acute kidney injury) (CMS-[facility])    Metabolic acidosis      Assessment  64 y.o. male with PMH of paraplegia, MDD, HLD, GERD, HTN, asthma, glaucoma and urinary retention with smith who presented from SNF for traumatic smith, nausea, and vomiting. In ED he was found to be in shock with significant hypotension and hypoxia. He was given sepsis IVF bolus, 1 unit of PRBCs, and started on norepinephrine. He was placed on HFNC and then BIPAP. Work up revealed lactic acidosis, metabolic acidosis, RAYNE, and pneumonia. He is admitted to ICU for septic shock and acute hypoxic respiratory failure requiring norepinephrine.     Plan  Neuro:  #Encephalopathy  #Hx of paraplegia  -A&Ox3 but lethargic   -Now intubated and sedated on fentanyl infusion  -CAM ICU assessment and ABCDEF bundle  -PT/OT when able    CV:  #Septic shock  #NSTEMI  #Hx of HTN  #Hx of HLD  -Continuous cardiac telemetry and Q1 vitals  -Placed arterial line and central line for close hemodynamic monitoring and vasopressors  -Continue norepinephrine and titrate for goal MAP > 65  -Start vasopressin and stress dose steroids  -EKG sinus tachycardia, Qtc 489, , no ST elevation   -troponin 145->128  -Last ECHO 11/22: EF 50-55%, impaired laxation pattern of LV diastolic filling  -Stress test: 5/2023 normal with EF > 65%  -ECHO ordered    Pulm:  #Acute hypoxic respiratory failure  #Pneumonia  #Hx of asthma  -Was on BIPAP 100% FiO2 in  ED  -Intubated on arrival to ICU for altered mental status and tachypnea/ongoing respiratory distress   -CXR: Asymmetric opacity in the right upper lung and also right basilar  opacity concerning for infiltrate.  -CT: atelectasis/consolidative opacities in bilateral lower lobes   -MV: ACVC  RR 20 FiO2 60 PEEP 8  -Maintain pulse ox > 92%  -Duonebs and bronchial hygiene    :  #RAYNE  #Hx of chronic urinary retention with smith  #Lactic acidosis  #Metabolic acidosis  #Hematuria  -BUN/Cr 26/3.76 (Last Cr 0.93 on 3/16/2024)  -Lactate 6.7->7.2->5.0  -Initial ABG post intubation: ph 7.18/pCO2 43/lactate 5.5, improved to ph 7.26/pCO2 35/lactate 5.0  -CT: Smith present, urinary bladder under distended, urinary bladder wall thickening and edema compatible with cystitis  -Monitor daily BMP  -Strict I/Os, maintain chronic smith  -Smith was flushed and some blood clots aspirated on arrival to ICU  -Intravenous hydration with sodium bicarb at 100 per hour  -Monitor and replace electrolytes per protocol    GI:  #Transaminitis  #Hx of GERD  -, , bilirubin 1.7, trend  -NPO  -OGT to LIWS  -GI prophylaxis: Protonix  -Bowel regimen: Colace and Miralax  -CT: Hepatic steatosis, minimal stool in sigmoid colon rectum    Endo:  -SSI and Accuchecks Q4  -Hypoglycemia protocol    Heme:  -H/H 14.3/43.8   -Monitor daily CBC  -Given TXA and 1 unit PRBC in ED  -DVT Prophylaxis: SCDs, heparin (hold if hematuria worsens)    ID:  #Septic shock  #Pneumonia  -afebrile, no leukocytosis  -Lactate 7.2->5.0  -Blood culture and urine cultures sent  -Urine antigens and procalcitonin pending  -MRSA, Covid/influenza swabs pending  -Obtain sputum culture if able  -CXR: Asymmetric opacity in the right upper lung and also right basilar  opacity concerning for infiltrate.  -CT: atelectasis/consolidative opacities in bilateral lower lobes, urinary bladder wall thickening and edema compatible with cystitis  -UA +leuks, negative nitrites,  1-5 WBCs, >20 RBCs  -Given cefepime, azithromycin, and Vanc in ED, continue broad spectrum antibiotics    LDAs: Ayon 11/17, ETT 11/18, OGT 11/18, R radial arterial line 11/18, RIJ CVC 11/18, PIVs    Dispo: Admit to ICU for mechanical ventilation and vasopressors.          I spent 40 minutes in the professional and overall care of this patient.      Eden Nguyễn, APRN-CNP

## 2024-11-18 NOTE — ED PROCEDURE NOTE
Procedure  Critical Care    Performed by: Vic Juarez MD  Authorized by: Vic Juarez MD    Critical care provider statement:     Critical care time (minutes):  62    Critical care time was exclusive of:  Separately billable procedures and treating other patients and teaching time    Critical care was necessary to treat or prevent imminent or life-threatening deterioration of the following conditions:  Circulatory failure, respiratory failure, sepsis, shock and trauma    Critical care was time spent personally by me on the following activities:  Development of treatment plan with patient or surrogate, evaluation of patient's response to treatment, examination of patient, obtaining history from patient or surrogate, ordering and performing treatments and interventions, ordering and review of laboratory studies, ordering and review of radiographic studies, pulse oximetry, re-evaluation of patient's condition and review of old charts               Vic Juarez MD  11/18/24 [id number]

## 2024-11-18 NOTE — CONSULTS
INPATIENT NEPHROLOGY CONSULT NOTE        CONSULT: Nephrology SERVICE    PATIENT NAME: Robel Eastman    MRN: [id number]  DATE of SERVICE: November 18, 2024  TIME of SERVICE: 7:40 AM      REASON FOR CONSULT: RAYNE   REQUESTING PHYSICIAN: Dr. Mckay   PRIMARY CARE PHYSICIAN: Denia Latif MD    HPI:  Mr. Eastman is a 64 y.o. male who presents for evaluation of traumatic smith catheter insertion. Dx with septic shock.     Patient with past medical history significant for paraplegia, major depressive disorder, hyperlipidemia, GERD, hypertension, asthma, glaucoma and urinary retention with chronic Smith catheter patient presented to [facility] from [facility] nursing [facility] for further evaluation of traumatic Smith catheter insertion followed by nausea and vomiting.  Upon presentation patient was lethargic and tachypneic required intubation.  Profound hypotension refractory to initial IV hydration requiring 2 pressor support including Levophed, vasopressin.    Profound metabolic acidosis requiring the initiation of sodium bicarbonate infusion.  Oliguria/hematuria Smith catheter in place with dark concentrated urine.  Urine output has been averaging about 25 cc/h.  Seen and evaluated at bedside in the intensive care unit patient intubated.  History obtained from discussing with ICU team, nursing staff, patient family and chart review.    ASSESSMENT AND PLAN:  #1 RAYNE likely ischemic and septic related ATN, postcontrast exposure  #2 Septic shock: On broad-spectrum antibiotics.  #3 metabolic acidemia pH of 7.18  #4 acute hypoxic respiratory failure required intubation  #5 lactic acidosis  #6 pneumonia.  #7 NSTEMI  #8 urinary retention with chronic indwelling Smith catheter  #9 associated conditions: Paraplegia, hypertension, hyperlipidemia, asthma    Plan:    Acute kidney injury multifactorial component of ischemic, septic ATN, contrast-induced  nephropathy.  at high risk of developing vancomycin toxicity.  To dose vancomycin to daily trough.   To continue resuscitation per sepsis protocol fluid appreciate ICU team recommendations.  Receiving IV sodium bicarb and infusion with 100 cc/h to repeat ABG.   Ayon catheter in place to continue to monitor input and output, at high risk for needing renal placement therapy if no signs of improvement.   Hematuria post Traumatic Ayon catheter insertion  5.    Will continue to monitor closely the need for CRRT  6.    Addendum: Patient revisited again at bedside around 1:29 PM plan of care discussed with ICU team with tentative plan to withdraw care.    I sincerely, thank you Dr. Izaguirre for this opportunity to participate in the care of your patient, I will follow from Nephrology point view!    PAST MEDICAL HISTORY:    Past Medical History:   Diagnosis Date    Age-related nuclear cataract, left eye 01/08/2019    Age-related nuclear cataract, left    Age-related nuclear cataract, right eye 01/08/2019    Age-related nuclear cataract, right    Other specified disorders of nose and nasal sinuses 12/06/2019    Nasal vestibulitis    Spondylosis without myelopathy or radiculopathy, site unspecified 10/12/2017    Degenerative spinal arthritis       PAST SURGICAL HISTORY:    Past Surgical History:   Procedure Laterality Date    OTHER SURGICAL HISTORY  12/06/2019    Hand surgery    OTHER SURGICAL HISTORY  07/26/2019    Cataract surgery    OTHER SURGICAL HISTORY  07/26/2019    Cholecystectomy laparoscopic       FAMILY HISTORY:  No family history on file.    SOCIAL HISTORY:    Social History     Tobacco Use    Smoking status: Never    Smokeless tobacco: Never       MEDICATIONS:  Prior to Admission Medications:  No medications prior to admission.       INPATIENT MEDICATIONS:    Current Facility-Administered Medications:     azithromycin 500 mg in dextrose 5% 250 mL IV, 500 mg, intravenous, Daily, LINETTE Traore-KATRINA, Stopped  at 11/18/24 0549    cefepime (Maxipime) 1 g in dextrose (iso) IV 50 mL, 1 g, intravenous, q12h, Eden Nguyễn, APRN-CNP    dexmedeTOMIDine (Precedex) 400 mcg in 100 mL (4 mcg/mL) sodium chloride 0.9% infusion, 0-1.5 mcg/kg/hr, intravenous, Continuous, Eden Bardalesfny, APRN-CNP, Last Rate: 4.92 mL/hr at 11/18/24 0734, 0.2 mcg/kg/hr at 11/18/24 0734    dextrose 50 % injection 12.5 g, 12.5 g, intravenous, q15 min PRN, Eden Bardalesfny, APRN-CNP    dextrose 50 % injection 25 g, 25 g, intravenous, q15 min PRN, Eden Bardalesfny, APRN-CNP    docusate sodium (Colace) oral liquid 100 mg, 100 mg, orogastric tube, BID, Eden Nguyễn, APRN-CNP    etomidate (Amidate) injection 15 mg, 15 mg, intravenous, Once, Eden Pierceny, APRN-CNP    fentanyl (Sublimaze) 1000 mcg in sodium chloride 0.9% 100 mL (10 mcg/mL) infusion (premix), 0-300 mcg/hr, intravenous, Continuous, Eden Bardalesfny, APRN-CNP, Last Rate: 7.5 mL/hr at 11/18/24 0247, 75 mcg/hr at 11/18/24 0247    fentaNYL PF (Sublimaze) injection 25 mcg, 25 mcg, intravenous, q10 min PRN, Eden Nguyễn, APRN-CNP, 25 mcg at 11/18/24 0346    glucagon (Glucagen) injection 1 mg, 1 mg, intramuscular, q15 min PRN, Eden MUNOZ Trefny, APRN-CNP    glucagon (Glucagen) injection 1 mg, 1 mg, intramuscular, q15 min PRN, Eden Bardalesfny, APRN-CNP    heparin (porcine) injection 5,000 Units, 5,000 Units, subcutaneous, q8h, Eden Bardalesfny, APRN-CNP, 5,000 Units at 11/18/24 0552    hydrocortisone sodium succinate (PF) (Solu-CORTEF) injection 50 mg, 50 mg, intravenous, q6h, Eden Nguyễn APRN-CNP, 50 mg at 11/18/24 0556    insulin lispro injection 0-5 Units, 0-5 Units, subcutaneous, q4h, LINETTE Traore-CNP    ipratropium-albuteroL (Duo-Neb) 0.5-2.5 mg/3 mL nebulizer solution 3 mL, 3 mL, nebulization, q6h, LINETTE Traore-CNP    magnesium sulfate 2 g in sterile water for injection 50 mL, 2 g, intravenous, Once, JELLY Traore    norepinephrine (Levophed) 8 mg in dextrose  5% 250 mL (0.032 mg/mL) infusion (premix), 0-1 mcg/kg/min, intravenous, Continuous, Eden C Trefny, APRN-CNP, Last Rate: 93.8 mL/hr at 11/18/24 0610, 0.5 mcg/kg/min at 11/18/24 0610    oxygen (O2) therapy, , inhalation, Continuous - Inhalation, Eden C Trefny, APRN-CNP    pantoprazole (ProtoNix) EC tablet 40 mg, 40 mg, oral, Daily before breakfast **OR** pantoprazole (ProtoNix) injection 40 mg, 40 mg, intravenous, Daily before breakfast, Eden C Trefny, APRN-CNP, 40 mg at 11/18/24 0600    polyethylene glycol (Glycolax, Miralax) packet 17 g, 17 g, orogastric tube, Daily, Eden C Trefny, APRN-CNP    rocuronium (Zemuron) injection 40 mg, 40 mg, intravenous, Once, Eden C Trefny, APRN-CNP    sodium bicarbonate 150 mEq in dextrose 5% 1,150 mL infusion, 100 mL/hr, intravenous, Continuous, Eden C Trefny, APRN-CNP, Last Rate: 100 mL/hr at 11/18/24 0348, 100 mL/hr at 11/18/24 0348    vancomycin (Vancocin) pharmacy to dose - pharmacy monitoring, , miscellaneous, Daily PRN, Eden C Trefny, APRN-CNP    vasopressin (Vasostrict) 0.2 unit/mL in 5% dextrose 100 mL IV, 0.03 Units/min, intravenous, Continuous, Eden C Trefny, APRN-CNP, Last Rate: 9 mL/hr at 11/18/24 0448, 0.03 Units/min at 11/18/24 0448    ALLERGIES:  Allergies   Allergen Reactions    Penicillins Itching and Rash       COMPLETE REVIEW OF SYSTEMS:    Intubated     PHYSICAL EXAM: Physical exam performed.  Patient Vitals for the past 24 hrs:   BP Temp Temp src Pulse Resp SpO2 Height Weight   11/18/24 0650 -- -- -- 110 21 94 % -- --   11/18/24 0645 -- -- -- 110 21 94 % -- --   11/18/24 0640 -- -- -- 110 22 94 % -- --   11/18/24 0635 -- -- -- 110 21 94 % -- --   11/18/24 0630 -- -- -- 110 21 94 % -- --   11/18/24 0625 -- -- -- 110 21 94 % -- --   11/18/24 0620 -- -- -- (!) 114 (!) 28 95 % -- --   11/18/24 0615 -- -- -- (!) 118 (!) 35 93 % -- --   11/18/24 0610 -- -- -- 108 22 93 % -- --   11/18/24 0605 -- -- -- 108 23 93 % -- --   11/18/24 0600 -- -- -- 110 24 93  % -- --   11/18/24 0555 -- -- -- 110 22 93 % -- --   11/18/24 0550 -- -- -- 110 22 93 % -- --   11/18/24 0545 -- -- -- 110 22 93 % -- --   11/18/24 0540 -- -- -- 110 23 93 % -- --   11/18/24 0535 -- -- -- 108 22 93 % -- --   11/18/24 0530 -- -- -- 108 24 93 % -- --   11/18/24 0525 -- -- -- 110 (!) 27 93 % -- --   11/18/24 0520 -- -- -- 108 26 93 % -- --   11/18/24 0515 93/60 -- -- (!) 112 25 93 % -- --   11/18/24 0510 98/57 -- -- (!) 112 26 93 % -- --   11/18/24 0505 90/59 -- -- (!) 112 23 93 % -- --   11/18/24 0500 83/55 -- -- (!) 116 (!) 28 95 % -- 98.4 kg (216 lb 14.9 oz)   11/18/24 0455 101/64 -- -- (!) 122 (!) 35 94 % -- --   11/18/24 0450 -- -- -- (!) 118 (!) 37 93 % -- --   11/18/24 0445 85/50 -- -- (!) 112 (!) 29 93 % -- --   11/18/24 0440 (!) 76/49 -- -- (!) 112 25 93 % -- --   11/18/24 0435 78/54 -- -- (!) 112 25 93 % -- --   11/18/24 0430 (!) 66/49 -- -- (!) 114 25 93 % -- --   11/18/24 0427 -- -- -- -- 23 93 % -- --   11/18/24 0425 79/50 -- -- (!) 118 (!) 30 91 % -- --   11/18/24 0420 (!) 76/46 -- -- (!) 112 23 92 % -- --   11/18/24 0415 (!) 82/49 -- -- (!) 112 22 92 % -- --   11/18/24 0410 72/59 -- -- 110 22 93 % -- --   11/18/24 0405 76/54 -- -- 110 21 93 % -- --   11/18/24 0400 (!) 65/48 -- -- (!) 112 22 92 % -- --   11/18/24 0355 70/53 -- -- (!) 114 24 92 % -- --   11/18/24 0350 93/60 -- -- (!) 122 (!) 31 93 % -- --   11/18/24 0345 98/65 37.3 °C (99.1 °F) Temporal (!) 124 (!) 30 94 % -- --   11/18/24 0340 86/57 -- -- (!) 124 (!) 29 94 % -- --   11/18/24 0335 96/60 -- -- (!) 124 (!) 30 93 % -- --   11/18/24 0330 -- -- -- (!) 124 (!) 31 93 % -- --   11/18/24 0325 -- -- -- (!) 126 (!) 28 93 % -- --   11/18/24 0320 113/62 -- -- (!) 128 (!) 29 94 % -- --   11/18/24 0315 -- -- -- (!) 128 (!) 31 95 % -- --   11/18/24 0310 -- -- -- (!) 128 21 95 % -- --   11/18/24 0305 -- -- -- (!) 130 (!) 27 94 % -- --   11/18/24 0300 145/72 -- -- (!) 130 24 95 % -- --   11/18/24 0255 -- -- -- (!) 130 20 95 % -- --    11/18/24 0250 -- -- -- (!) 130 21 95 % -- --   11/18/24 0245 158/77 -- -- (!) 132 22 95 % -- --   11/18/24 0240 -- -- -- (!) 130 21 95 % -- --   11/18/24 0235 -- -- -- (!) 128 21 95 % -- --   11/18/24 0230 144/72 -- -- (!) 122 19 99 % -- --   11/18/24 0225 90/54 -- -- (!) 120 22 95 % -- --   11/18/24 0220 97/61 -- -- 108 (!) 27 100 % -- --   11/18/24 0215 83/52 -- -- (!) 112 (!) 32 100 % -- --   11/18/24 0210 (!) 71/46 -- -- 110 (!) 31 100 % -- --   11/18/24 0130 105/55 -- -- (!) 126 (!) 42 97 % -- --   11/18/24 0115 121/58 -- -- (!) 128 (!) 41 98 % -- --   11/18/24 0109 101/84 -- -- (!) 126 (!) 41 97 % -- --   11/18/24 0107 91/76 -- -- (!) 125 (!) 37 96 % -- --   11/18/24 0105 85/64 -- -- (!) 124 (!) 37 (!) 76 % -- --   11/18/24 0103 86/56 -- -- (!) 123 (!) 40 (!) 76 % -- --   11/18/24 0101 85/51 -- -- (!) 121 (!) 40 (!) 77 % -- --   11/18/24 0100 85/55 -- -- (!) 120 (!) 42 (!) 77 % -- --   11/18/24 0059 (!) 87/49 -- -- (!) 121 (!) 41 (!) 77 % -- --   11/18/24 0056 108/50 -- -- (!) 121 (!) 45 (!) 77 % -- --   11/18/24 0054 (!) 131/96 -- -- (!) 123 (!) 44 (!) 78 % -- --   11/18/24 0052 (!) 71/46 -- -- (!) 119 (!) 41 (!) 77 % -- --   11/18/24 0050 (!) 73/47 -- -- (!) 108 (!) 39 97 % -- --   11/18/24 0049 (!) 72/45 -- -- (!) 106 (!) 36 (!) 93 % -- --   11/18/24 0048 (!) 75/46 -- -- (!) 103 (!) 37 -- -- --   11/18/24 0047 (!) 85/49 -- -- (!) 106 (!) 35 -- -- --   11/18/24 0045 75/51 -- -- (!) 106 (!) 34 -- -- --   11/18/24 0044 (!) 78/38 -- -- (!) 105 (!) 33 -- -- --   11/18/24 0042 (!) 78/39 -- -- (!) 103 (!) 36 -- -- --   11/18/24 0041 83/53 -- -- (!) 104 (!) 36 -- -- --   11/18/24 0036 81/50 -- -- (!) 105 -- -- -- --   11/18/24 0030 81/50 -- -- (!) 105 (!) 35 -- -- --   11/18/24 0015 (!) 85/46 -- -- (!) 107 (!) 33 99 % -- --   11/18/24 0000 96/50 -- -- (!) 113 (!) 42 -- -- --   11/17/24 2345 (!) 97/46 -- -- (!) 114 (!) 42 (!) 93 % -- --   11/17/24 2344 -- 37.4 °C (99.3 °F) Temporal -- -- -- -- --   11/17/24 1923  "-- -- -- (!) 107 (!) 37 -- -- --   11/17/24 2308 -- 37.3 °C (99.1 °F) Temporal -- -- -- -- --   11/17/24 2300 95/55 -- -- (!) 103 (!) 41 (!) 92 % -- --   11/17/24 2251 -- 37.1 °C (98.8 °F) Temporal -- -- -- -- --   11/17/24 2245 (!) 75/42 -- -- (!) 107 (!) 46 (!) 88 % -- --   11/17/24 2236 -- 37.3 °C (99.1 °F) Temporal -- -- -- -- --   11/17/24 2230 84/50 -- -- (!) 106 (!) 41 (!) 86 % -- --   11/17/24 2224 -- 36.8 °C (98.2 °F) Temporal -- -- -- -- --   11/17/24 2215 -- 37.5 °C (99.5 °F) Temporal (!) 109 (!) 47 (!) 76 % -- --   11/17/24 2206 128/60 37.7 °C (99.9 °F) Temporal (!) 112 (!) 44 (!) 82 % -- --   11/17/24 2205 -- 37.7 °C (99.9 °F) Temporal (!) 112 (!) 44 (!) 86 % -- --   11/17/24 2200 128/60 -- -- (!) 116 (!) 28 (!) 89 % -- --   11/17/24 2155 -- -- -- (!) 101 (!) 37 (!) 76 % -- --   11/17/24 2145 128/53 -- -- (!) 105 (!) 45 (!) 86 % -- --   11/17/24 2140 -- -- -- (!) 103 (!) 45 -- -- --   11/17/24 2135 -- -- -- (!) 103 (!) 50 (!) 89 % -- --   11/17/24 2130 77/50 -- -- (!) 101 (!) 48 (!) 88 % -- --   11/17/24 2125 77/50 37.7 °C (99.9 °F) Temporal (!) 104 (!) 30 (!) 75 % 1.676 m (5' 6\") 100 kg (220 lb 7.4 oz)   11/17/24 2122 -- 37.2 °C (99 °F) -- -- -- -- -- --   11/17/24 2120 -- -- -- (!) 105 -- (!) 75 % -- --     Body mass index is 35.01 kg/m².    CONSTITUTIONAL:  Vital signs reviewed. Hemodynamically unstable.  GENERAL: intubated  SKIN: Right IJ central line in place  HEAD/SINUSES: Atraumatic  EYES: PERRLA, + pallor  OROPHARYNX: Dry mucous membranes  NECK: +  jugulovenous distention, No carotid bruits, Carotid pulse normal contour, Supple  LUNGS: Mechanical breath sounds  CARDIAC: distant S1 and S2; no rubs, murmurs, or gallops  ABDOMEN: Abdomen soft, non-tender, BS normal, distended  EXTREMITIES: Good capillary refill,  no edema.  NEUROLOGIC: Intubated  HEMATOLOGIC/Lymphatic/Immunologic: No abnormal bleeding, echymosis, inflammation. No cervical or supraclavicular lymphadenopathy.  : Ayon catheter in " place    Intake/Output last 3 shifts:  I/O last 3 completed shifts:  In: 248.3 (2.5 mL/kg) [Blood:248.3]  Out: 150 (1.5 mL/kg) [Urine:150 (0 mL/kg/hr)]  Weight: 98.4 kg     Diagnostic tests reviewed for today's visit:    CBC, Coags, RNP, Mg, Phos   Results from last 7 days   Lab Units 11/18/24  0531   WBC AUTO x10*3/uL 10.2   RBC AUTO x10*6/uL 5.09   HEMOGLOBIN g/dL 14.3   HEMATOCRIT % 43.8     Results from last 7 days   Lab Units 11/18/24  0531   SODIUM mmol/L 131*   POTASSIUM mmol/L 4.1   CHLORIDE mmol/L 99   CO2 mmol/L 19*   BUN mg/dL 37*   CREATININE mg/dL 3.97*   CALCIUM mg/dL 7.8*   PHOSPHORUS mg/dL 4.8   MAGNESIUM mg/dL 1.42*   BILIRUBIN TOTAL mg/dL 3.3*   ALT U/L 324*   AST U/L 453*     Results from last 7 days   Lab Units 11/17/24  2149   COLOR U  Red*   APPEARANCE U  Turbid*   PH U  7.0   SPEC GRAV UR  1.025   PROTEIN U mg/dL 100 (2+)*   BLOOD UR  1.0 (3+)*   NITRITE U  NEGATIVE   WBC UR /HPF 1-5       Narrative & Impression   Interpreted By:  Lionel Sanders,   STUDY:  XR CHEST 1 VIEW;  11/18/2024 6:07 am      INDICATION:  Signs/Symptoms:RIJ CVC.      COMPARISON:  None.      ACCESSION NUMBER(S):  [id number]      ORDERING CLINICIAN:  KIARA COREAS      FINDINGS:      CARDIOMEDIASTINAL SILHOUETTE:  Cardiomediastinal silhouette is normal in size and configuration.      LUNGS/PLEURA:  Mild bibasilar subsegmental atelectasis. There are no  consolidations.There are no pleural effusions. There is no  demonstrated pneumothorax.      LINES/TUBES: Interval placement of right internal jugular central  venous catheter terminating in the superior vena cava. Endotracheal  tube terminates 3.5 cm above the bryant. Enteric tube courses below  the diaphragm outside of the field of view.      BONES: No evidence of acute osseous abnormality.      IMPRESSION:  1.  Interval placement of right internal jugular central venous  catheter terminating in superior vena cava. Endotracheal and enteric  tube in place.         IMAGING:  "CXR reviewed in  images.      SIGNATURE: Elana Olson MD  Nephrology and Hypertension  [address] [place]  Office phone: [phone]  FAX: [phone]      This note was partially created using voice recognition software and is inherently subject to errors including those of syntax and \"sound-alike\" substitutions which may escape proofreading.  In such instances, original meaning may be extrapolated by contextual derivation.    "

## 2024-11-18 NOTE — PROCEDURES
Central Line    Date/Time: 11/18/2024 5:35 AM    Performed by: JELLY Traore  Authorized by: JELLY Traore    Consent:     Consent obtained:  Verbal    Consent given by:  Healthcare agent    Risks, benefits, and alternatives were discussed: yes    Universal protocol:     Immediately prior to procedure, a time out was called: yes      Patient identity confirmed:  Arm band  Pre-procedure details:     Indication(s): central venous access      Hand hygiene: Hand hygiene performed prior to insertion      Sterile barrier technique: All elements of maximal sterile technique followed      Skin preparation:  Chlorhexidine    Skin preparation agent: Skin preparation agent completely dried prior to procedure    Sedation:     Sedation type: Given 1 mg Versed.  Procedure details:     Location:  R internal jugular    Patient position:  Trendelenburg    Procedural supplies:  Triple lumen    Catheter size:  7 Fr    Catheter length:  16    Landmarks identified: yes      Ultrasound guidance: yes      Ultrasound guidance timing: real time      Sterile ultrasound techniques: Sterile gel and sterile probe covers were used      Number of attempts:  1    Successful placement: yes    Post-procedure details:     Post-procedure:  Dressing applied and line sutured    Assessment:  Blood return through all ports, free fluid flow and placement verified by x-ray    Procedure completion:  Tolerated well, no immediate complications

## 2024-11-19 LAB — BACTERIA UR CULT: ABNORMAL

## 2024-11-19 SDOH — SOCIAL STABILITY: SOCIAL INSECURITY: DO YOU FEEL ANYONE HAS EXPLOITED OR TAKEN ADVANTAGE OF YOU FINANCIALLY OR OF YOUR PERSONAL PROPERTY?: UNABLE TO ASSESS

## 2024-11-19 SDOH — HEALTH STABILITY: PHYSICAL HEALTH
HOW OFTEN DO YOU NEED TO HAVE SOMEONE HELP YOU WHEN YOU READ INSTRUCTIONS, PAMPHLETS, OR OTHER WRITTEN MATERIAL FROM YOUR DOCTOR OR PHARMACY?: SOMETIMES

## 2024-11-19 SDOH — SOCIAL STABILITY: SOCIAL NETWORK: IN A TYPICAL WEEK, HOW MANY TIMES DO YOU TALK ON THE PHONE WITH FAMILY, FRIENDS, OR NEIGHBORS?: PATIENT UNABLE TO ANSWER

## 2024-11-19 SDOH — SOCIAL STABILITY: SOCIAL INSECURITY
WITHIN THE LAST YEAR, HAVE YOU BEEN KICKED, HIT, SLAPPED, OR OTHERWISE PHYSICALLY HURT BY YOUR PARTNER OR EX-PARTNER?: PATIENT UNABLE TO ANSWER

## 2024-11-19 SDOH — SOCIAL STABILITY: SOCIAL INSECURITY: HAS ANYONE EVER THREATENED TO HURT YOUR FAMILY OR YOUR PETS?: UNABLE TO ASSESS

## 2024-11-19 SDOH — SOCIAL STABILITY: SOCIAL INSECURITY: DO YOU FEEL UNSAFE GOING BACK TO THE PLACE WHERE YOU ARE LIVING?: UNABLE TO ASSESS

## 2024-11-19 SDOH — ECONOMIC STABILITY: INCOME INSECURITY
IN THE PAST 12 MONTHS HAS THE ELECTRIC, GAS, OIL, OR WATER COMPANY THREATENED TO SHUT OFF SERVICES IN YOUR HOME?: PATIENT UNABLE TO ANSWER

## 2024-11-19 SDOH — SOCIAL STABILITY: SOCIAL NETWORK: HOW OFTEN DO YOU ATTEND CHURCH OR RELIGIOUS SERVICES?: PATIENT UNABLE TO ANSWER

## 2024-11-19 SDOH — SOCIAL STABILITY: SOCIAL INSECURITY
WITHIN THE LAST YEAR, HAVE YOU BEEN RAPED OR FORCED TO HAVE ANY KIND OF SEXUAL ACTIVITY BY YOUR PARTNER OR EX-PARTNER?: PATIENT UNABLE TO ANSWER

## 2024-11-19 SDOH — HEALTH STABILITY: PHYSICAL HEALTH
HOW OFTEN DO YOU NEED TO HAVE SOMEONE HELP YOU WHEN YOU READ INSTRUCTIONS, PAMPHLETS, OR OTHER WRITTEN MATERIAL FROM YOUR DOCTOR OR PHARMACY?: PATIENT UNABLE TO RESPOND

## 2024-11-19 SDOH — ECONOMIC STABILITY: HOUSING INSECURITY: IN THE PAST 12 MONTHS, HOW MANY TIMES HAVE YOU MOVED WHERE YOU WERE LIVING?: 2

## 2024-11-19 SDOH — SOCIAL STABILITY: SOCIAL NETWORK: HOW OFTEN DO YOU GET TOGETHER WITH FRIENDS OR RELATIVES?: PATIENT UNABLE TO ANSWER

## 2024-11-19 SDOH — HEALTH STABILITY: MENTAL HEALTH: HOW OFTEN DO YOU HAVE SIX OR MORE DRINKS ON ONE OCCASION?: PATIENT UNABLE TO ANSWER

## 2024-11-19 SDOH — SOCIAL STABILITY: SOCIAL INSECURITY: ARE YOU MARRIED, WIDOWED, DIVORCED, SEPARATED, NEVER MARRIED, OR LIVING WITH A PARTNER?: PATIENT UNABLE TO ANSWER

## 2024-11-19 SDOH — SOCIAL STABILITY: SOCIAL NETWORK
DO YOU BELONG TO ANY CLUBS OR ORGANIZATIONS SUCH AS CHURCH GROUPS, UNIONS, FRATERNAL OR ATHLETIC GROUPS, OR SCHOOL GROUPS?: PATIENT UNABLE TO ANSWER

## 2024-11-19 SDOH — SOCIAL STABILITY: SOCIAL INSECURITY: DOES ANYONE TRY TO KEEP YOU FROM HAVING/CONTACTING OTHER FRIENDS OR DOING THINGS OUTSIDE YOUR HOME?: UNABLE TO ASSESS

## 2024-11-19 SDOH — HEALTH STABILITY: PHYSICAL HEALTH: ON AVERAGE, HOW MANY MINUTES DO YOU ENGAGE IN EXERCISE AT THIS LEVEL?: PATIENT UNABLE TO ANSWER

## 2024-11-19 SDOH — HEALTH STABILITY: MENTAL HEALTH: HOW MANY DRINKS CONTAINING ALCOHOL DO YOU HAVE ON A TYPICAL DAY WHEN YOU ARE DRINKING?: PATIENT UNABLE TO ANSWER

## 2024-11-19 SDOH — SOCIAL STABILITY: SOCIAL INSECURITY
WITHIN THE LAST YEAR, HAVE YOU BEEN HUMILIATED OR EMOTIONALLY ABUSED IN OTHER WAYS BY YOUR PARTNER OR EX-PARTNER?: PATIENT UNABLE TO ANSWER

## 2024-11-19 SDOH — SOCIAL STABILITY: SOCIAL INSECURITY: WITHIN THE LAST YEAR, HAVE YOU BEEN AFRAID OF YOUR PARTNER OR EX-PARTNER?: PATIENT UNABLE TO ANSWER

## 2024-11-19 SDOH — HEALTH STABILITY: MENTAL HEALTH: HOW OFTEN DO YOU HAVE A DRINK CONTAINING ALCOHOL?: PATIENT UNABLE TO ANSWER

## 2024-11-19 SDOH — HEALTH STABILITY: MENTAL HEALTH
DO YOU FEEL STRESS - TENSE, RESTLESS, NERVOUS, OR ANXIOUS, OR UNABLE TO SLEEP AT NIGHT BECAUSE YOUR MIND IS TROUBLED ALL THE TIME - THESE DAYS?: PATIENT UNABLE TO ANSWER

## 2024-11-19 SDOH — SOCIAL STABILITY: SOCIAL INSECURITY: ARE YOU OR HAVE YOU BEEN THREATENED OR ABUSED PHYSICALLY, EMOTIONALLY, OR SEXUALLY BY ANYONE?: UNABLE TO ASSESS

## 2024-11-19 SDOH — SOCIAL STABILITY: SOCIAL NETWORK: HOW OFTEN DO YOU ATTEND MEETINGS OF THE CLUBS OR ORGANIZATIONS YOU BELONG TO?: PATIENT UNABLE TO ANSWER

## 2024-11-19 SDOH — ECONOMIC STABILITY: FOOD INSECURITY
WITHIN THE PAST 12 MONTHS, THE FOOD YOU BOUGHT JUST DIDN'T LAST AND YOU DIDN'T HAVE MONEY TO GET MORE.: PATIENT UNABLE TO ANSWER

## 2024-11-19 SDOH — SOCIAL STABILITY: SOCIAL INSECURITY: ABUSE: ADULT

## 2024-11-19 SDOH — HEALTH STABILITY: PHYSICAL HEALTH
ON AVERAGE, HOW MANY DAYS PER WEEK DO YOU ENGAGE IN MODERATE TO STRENUOUS EXERCISE (LIKE A BRISK WALK)?: PATIENT UNABLE TO ANSWER

## 2024-11-19 SDOH — ECONOMIC STABILITY: TRANSPORTATION INSECURITY
IN THE PAST 12 MONTHS, HAS LACK OF TRANSPORTATION KEPT YOU FROM MEDICAL APPOINTMENTS OR FROM GETTING MEDICATIONS?: PATIENT UNABLE TO ANSWER

## 2024-11-19 SDOH — SOCIAL STABILITY: SOCIAL INSECURITY: HAVE YOU HAD THOUGHTS OF HARMING ANYONE ELSE?: UNABLE TO ASSESS

## 2024-11-19 SDOH — SOCIAL STABILITY: SOCIAL INSECURITY: ARE THERE ANY APPARENT SIGNS OF INJURIES/BEHAVIORS THAT COULD BE RELATED TO ABUSE/NEGLECT?: UNABLE TO ASSESS

## 2024-11-19 SDOH — ECONOMIC STABILITY: FOOD INSECURITY
HOW HARD IS IT FOR YOU TO PAY FOR THE VERY BASICS LIKE FOOD, HOUSING, MEDICAL CARE, AND HEATING?: PATIENT UNABLE TO ANSWER

## 2024-11-19 SDOH — ECONOMIC STABILITY: FOOD INSECURITY
WITHIN THE PAST 12 MONTHS, YOU WORRIED THAT YOUR FOOD WOULD RUN OUT BEFORE YOU GOT THE MONEY TO BUY MORE.: PATIENT UNABLE TO ANSWER

## 2024-11-19 SDOH — ECONOMIC STABILITY: HOUSING INSECURITY
IN THE LAST 12 MONTHS, WAS THERE A TIME WHEN YOU WERE NOT ABLE TO PAY THE MORTGAGE OR RENT ON TIME?: PATIENT UNABLE TO ANSWER

## 2024-11-19 SDOH — SOCIAL STABILITY: SOCIAL INSECURITY: HAVE YOU HAD ANY THOUGHTS OF HARMING ANYONE ELSE?: UNABLE TO ASSESS

## 2024-11-19 SDOH — ECONOMIC STABILITY: HOUSING INSECURITY: AT ANY TIME IN THE PAST 12 MONTHS, WERE YOU HOMELESS OR LIVING IN A SHELTER (INCLUDING NOW)?: PATIENT UNABLE TO ANSWER

## 2024-11-19 ASSESSMENT — LIFESTYLE VARIABLES
HOW MANY STANDARD DRINKS CONTAINING ALCOHOL DO YOU HAVE ON A TYPICAL DAY: PATIENT UNABLE TO ANSWER
AUDIT-C TOTAL SCORE: -1
AUDIT-C TOTAL SCORE: -1
HOW OFTEN DO YOU HAVE A DRINK CONTAINING ALCOHOL: PATIENT UNABLE TO ANSWER
AUDIT-C TOTAL SCORE: -1
HOW OFTEN DO YOU HAVE 6 OR MORE DRINKS ON ONE OCCASION: PATIENT UNABLE TO ANSWER
SKIP TO QUESTIONS 9-10: 0
SKIP TO QUESTIONS 9-10: 0
AUDIT-C TOTAL SCORE: -1
SKIP TO QUESTIONS 9-10: 0
AUDIT-C TOTAL SCORE: -1
HOW OFTEN DO YOU HAVE A DRINK CONTAINING ALCOHOL: PATIENT UNABLE TO ANSWER
PRESCIPTION_ABUSE_PAST_12_MONTHS: NO
AUDIT-C TOTAL SCORE: -1
SUBSTANCE_ABUSE_PAST_12_MONTHS: NO
HOW OFTEN DO YOU HAVE 6 OR MORE DRINKS ON ONE OCCASION: PATIENT UNABLE TO ANSWER
SKIP TO QUESTIONS 9-10: 0
HOW MANY STANDARD DRINKS CONTAINING ALCOHOL DO YOU HAVE ON A TYPICAL DAY: PATIENT UNABLE TO ANSWER

## 2024-11-19 ASSESSMENT — ACTIVITIES OF DAILY LIVING (ADL)
LACK_OF_TRANSPORTATION: PATIENT UNABLE TO ANSWER
LACK_OF_TRANSPORTATION: PATIENT UNABLE TO ANSWER
HEARING - RIGHT EAR: UNABLE TO ASSESS
BATHING: UNABLE TO ASSESS
ASSISTIVE_DEVICE: OTHER (COMMENT)
DRESSING YOURSELF: UNABLE TO ASSESS
PATIENT'S MEMORY ADEQUATE TO SAFELY COMPLETE DAILY ACTIVITIES?: UNABLE TO ASSESS
FEEDING YOURSELF: UNABLE TO ASSESS
HEARING - LEFT EAR: UNABLE TO ASSESS
TOILETING: UNABLE TO ASSESS
LACK_OF_TRANSPORTATION: PATIENT UNABLE TO ANSWER
GROOMING: UNABLE TO ASSESS
WALKS IN HOME: UNABLE TO ASSESS
JUDGMENT_ADEQUATE_SAFELY_COMPLETE_DAILY_ACTIVITIES: UNABLE TO ASSESS
ADEQUATE_TO_COMPLETE_ADL: UNABLE TO ASSESS

## 2024-11-19 ASSESSMENT — COGNITIVE AND FUNCTIONAL STATUS - GENERAL
PATIENT BASELINE BEDBOUND: NO
STANDING UP FROM CHAIR USING ARMS: TOTAL
PERSONAL GROOMING: TOTAL
MOVING FROM LYING ON BACK TO SITTING ON SIDE OF FLAT BED WITH BEDRAILS: TOTAL
DRESSING REGULAR LOWER BODY CLOTHING: TOTAL
DRESSING REGULAR UPPER BODY CLOTHING: TOTAL
MOBILITY SCORE: 6
CLIMB 3 TO 5 STEPS WITH RAILING: TOTAL
WALKING IN HOSPITAL ROOM: TOTAL
TOILETING: TOTAL
HELP NEEDED FOR BATHING: TOTAL
EATING MEALS: TOTAL
MOVING TO AND FROM BED TO CHAIR: TOTAL
TURNING FROM BACK TO SIDE WHILE IN FLAT BAD: TOTAL
DAILY ACTIVITIY SCORE: 6

## 2024-11-19 ASSESSMENT — PATIENT HEALTH QUESTIONNAIRE - PHQ9
2. FEELING DOWN, DEPRESSED OR HOPELESS: NOT AT ALL
SUM OF ALL RESPONSES TO PHQ9 QUESTIONS 1 & 2: 0
1. LITTLE INTEREST OR PLEASURE IN DOING THINGS: NOT AT ALL
1. LITTLE INTEREST OR PLEASURE IN DOING THINGS: NOT AT ALL
SUM OF ALL RESPONSES TO PHQ9 QUESTIONS 1 & 2: 0
2. FEELING DOWN, DEPRESSED OR HOPELESS: NOT AT ALL

## 2024-11-19 ASSESSMENT — COLUMBIA-SUICIDE SEVERITY RATING SCALE - C-SSRS
6. HAVE YOU EVER DONE ANYTHING, STARTED TO DO ANYTHING, OR PREPARED TO DO ANYTHING TO END YOUR LIFE?: NO
1. IN THE PAST MONTH, HAVE YOU WISHED YOU WERE DEAD OR WISHED YOU COULD GO TO SLEEP AND NOT WAKE UP?: NO
2. HAVE YOU ACTUALLY HAD ANY THOUGHTS OF KILLING YOURSELF?: NO

## 2024-11-19 NOTE — SIGNIFICANT EVENT
Death Within 24 Hours of Soft Wrist Restraint Utilization    Death within 24 hours of soft wrist restraint logged at 11/19/2024 at 9:34 AM.    Aleja Bal RN  Date: 11/19/2024  Time: 9:34 AM

## 2024-11-19 NOTE — DISCHARGE SUMMARY
Discharge Diagnosis  Septic Shock    Issues Requiring Follow-Up      Discharge Meds     Medication List      ASK your doctor about these medications     acetaminophen 325 mg tablet; Commonly known as: Tylenol   amLODIPine 5 mg tablet; Commonly known as: Norvasc   ascorbic acid 500 mg tablet; Commonly known as: Vitamin C   aspirin 81 mg EC tablet   atorvastatin 40 mg tablet; Commonly known as: Lipitor   baclofen 10 mg tablet; Commonly known as: Lioresal   bisacodyl 10 mg suppository; Commonly known as: Dulcolax   brimonidine 0.15 % ophthalmic solution; Commonly known as: AlphaGAN P   Deep Sea Nasal 0.65 % nasal spray; Generic drug: sodium chloride   docusate sodium 100 mg capsule; Commonly known as: Colace   dorzolamide 2 % ophthalmic solution; Commonly known as: Trusopt   DULoxetine 60 mg DR capsule; Commonly known as: Cymbalta   fluocinolone 0.01 % external solution; Commonly known as: Synalar   fluticasone 50 mcg/actuation nasal spray; Commonly known as: Flonase   gabapentin 800 mg tablet; Commonly known as: Neurontin   hydrALAZINE 25 mg tablet; Commonly known as: Apresoline   latanoprost 0.005 % ophthalmic solution; Commonly known as: Xalatan   magnesium hydroxide 400 mg/5 mL suspension; Commonly known as: Milk of   Magnesia   mirtazapine 7.5 mg tablet; Commonly known as: Remeron   multivitamin tablet   nitrofurantoin 50 mg capsule; Commonly known as: Macrodantin   pantoprazole 40 mg EC tablet; Commonly known as: ProtoNix   polyethylene glycol 17 gram packet; Commonly known as: Glycolax, Miralax   sodium phosphates 19-7 gram/118 mL enema enema; Commonly known as:   Triny       Test Results Pending At Discharge  Pending Labs       No current pending labs.            Hospital Course   64-year-old male with medical history of paraplegia, MDD, hyperlipidemia, GERD, hypertension, asthma, glaucoma, urinary retention with Ayon who presented from SNF for traumatic Ayon, nausea, vomiting.  Per EMS report, staff RN  reported issue with Ayon so they replaced it and there was bleeding and abdominal pain along with vomiting.  During evaluation by ICU attending, he was lethargic and tachypneic, able to answer one-word questions.  Patient was subsequently intubated and had central line placed but continued to decompensate.  Discussion was had in the ICU with family with hospice and indicated patient would most likely be comfort care and extubated, and patient was transition to comfort care.  Patient  and passed away peacefully at 20:48.     Pertinent Physical Exam At Time of Discharge  Physical Exam  Pt not responsive to verbal or tactile stimuli.  Pupils are fixed and dilated.  Absent corneal and gag reflexes.  Absent cardiopulmonary sounds, auscultated for >60 seconds.  Absent pulses, palpated for >60 seconds.    Outpatient Follow-Up  No future appointments.      Dk Alexis DO

## 2024-11-22 LAB
BACTERIA BLD CULT: NORMAL
BACTERIA BLD CULT: NORMAL
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
## (undated) DEVICE — PENCIL ES L3M BTTN SWCH HOLSTER W/ BLDE ELECTRD EDGE

## (undated) DEVICE — GOWN,AURORA,NONREINFORCED,LARGE: Brand: MEDLINE

## (undated) DEVICE — INTENT TO BE USED WITH SUTURE MATERIAL FOR TISSUE CLOSURE: Brand: RICHARD-ALLAN® NEEDLE 1/2 CIRCLE TAPER

## (undated) DEVICE — MEDI-VAC NON-CONDUCTIVE SUCTION TUBING: Brand: CARDINAL HEALTH

## (undated) DEVICE — CONVERTED USE 291618 SPONGE LAPAROTOMY POCKET POUCH RING 18

## (undated) DEVICE — TUBING, SUCTION, 1/4" X 10', STRAIGHT: Brand: MEDLINE

## (undated) DEVICE — SYRINGE BLB 50CC IRRIG PLIABLE FNGR FLNG GRAD FLSK DISP

## (undated) DEVICE — GAUZE SPONGES,12 PLY: Brand: CURITY

## (undated) DEVICE — HAND II: Brand: MEDLINE INDUSTRIES, INC.

## (undated) DEVICE — SUTURE VCRL SZ 1 L36IN ABSRB UD L36MM CT-1 1/2 CIR J947H

## (undated) DEVICE — LABEL MED MINI W/ MARKER

## (undated) DEVICE — INTENDED FOR TISSUE SEPARATION, AND OTHER PROCEDURES THAT REQUIRE A SHARP SURGICAL BLADE TO PUNCTURE OR CUT.: Brand: BARD-PARKER ® CARBON RIB-BACK BLADES

## (undated) DEVICE — BRUSH ENDO CLN L90.5IN SHTH DIA1.7MM BRIST DIA5-7MM 2-6MM

## (undated) DEVICE — FLEXIBLE YANKAUER,LARGE TIP, NO VACUUM CONTROL: Brand: ARGYLE

## (undated) DEVICE — MATERIAL CAST W5XL45IN BLU LBL SPLNT PLSTR OF PARIS

## (undated) DEVICE — DRAPE,U/ SHT,SPLIT,PLAS,STERIL: Brand: MEDLINE

## (undated) DEVICE — 3M™ COBAN™ STERILE SELF-ADHERENT WRAP, 1584S, 4 IN X 5 YD (10 CM X 4,5 M), 18 ROLLS/CASE: Brand: 3M™ COBAN™

## (undated) DEVICE — DRESSING GZ W1XL8IN COT XRFRM N ADH OVERWRAP CURAD

## (undated) DEVICE — SUTURE MCRYL + SZ 4-0 L27IN ABSRB UD L19MM PS-2 3/8 CIR MCP426H

## (undated) DEVICE — GLOVE ORANGE PI 7   MSG9070

## (undated) DEVICE — ENDO CARRY-ON PROCEDURE KIT: Brand: ENDO CARRY-ON PROCEDURE KIT

## (undated) DEVICE — CHLORAPREP 26ML ORANGE

## (undated) DEVICE — ALCOHOL RUBBING ISO 16OZ 70%

## (undated) DEVICE — GLOVE ORANGE PI 8   MSG9080

## (undated) DEVICE — NEEDLE CIRCLE 1/2 IN SUTURE TAPER L1.950IN DIA0.056IN ABD S STL

## (undated) DEVICE — UNDERCAST PADDING: Brand: DEROYAL

## (undated) DEVICE — BANDAGE COMPR SGL LAYERED CLP CLSR E 33FT LEN 4IN W TETRA

## (undated) DEVICE — ELECTRODE PT RET AD L9FT HI MOIST COND ADH HYDRGEL CORDED

## (undated) DEVICE — Z DISCONTINUED PER MEDLINE (LOW STOCK)  USE 2422770 DRAPE C ARM W54XL78IN FOR FLROSCN

## (undated) DEVICE — 3M™ STERI-DRAPE™ U-DRAPE 1015: Brand: STERI-DRAPE™

## (undated) DEVICE — Device: Brand: ENDO SMARTCAP

## (undated) DEVICE — SUTURE VCRL SZ 2-0 L36IN ABSRB UD L36MM CT-1 1/2 CIR J945H

## (undated) DEVICE — ZIMMER® STERILE DISPOSABLE TOURNIQUET CUFF, DUAL PORT, SINGLE BLADDER, 18 IN. (46 CM)

## (undated) DEVICE — SUTURE FIBERWIRE SZ 2 W/ TAPERED NEEDLE BLUE L38IN NONABSORB BLU L26.5MM 1/2 CIRCLE AR7200

## (undated) DEVICE — SINGLE PORT MANIFOLD: Brand: NEPTUNE 2

## (undated) DEVICE — TUBE SET 96 MM 64 MM H2O PERISTALTIC STD AUX CHANNEL

## (undated) DEVICE — ADAPTER FLSH PMP FLD MGMT GI IRRIG OFP 2 DISPOSABLE

## (undated) DEVICE — GLOVE SURG SZ 8 L11.2IN FNGR THK12.7MIL CUF THK9.7MIL BRN

## (undated) DEVICE — CONVERTORS STOCKINETTE: Brand: CONVERTORS